# Patient Record
Sex: MALE | Race: WHITE | URBAN - NONMETROPOLITAN AREA
[De-identification: names, ages, dates, MRNs, and addresses within clinical notes are randomized per-mention and may not be internally consistent; named-entity substitution may affect disease eponyms.]

---

## 2018-05-15 ENCOUNTER — DOCTOR'S OFFICE (OUTPATIENT)
Dept: URBAN - NONMETROPOLITAN AREA CLINIC 1 | Facility: CLINIC | Age: 58
Setting detail: OPHTHALMOLOGY
End: 2018-05-15
Payer: COMMERCIAL

## 2018-05-15 DIAGNOSIS — H02.403: ICD-10-CM

## 2018-05-15 DIAGNOSIS — H25.13: ICD-10-CM

## 2018-05-15 DIAGNOSIS — H40.023: ICD-10-CM

## 2018-05-15 DIAGNOSIS — H40.033: ICD-10-CM

## 2018-05-15 DIAGNOSIS — H50.52: ICD-10-CM

## 2018-05-15 DIAGNOSIS — H53.2: ICD-10-CM

## 2018-05-15 PROCEDURE — 99203 OFFICE O/P NEW LOW 30 MIN: CPT | Performed by: OPHTHALMOLOGY

## 2018-05-15 PROCEDURE — 92132 CPTRZD OPH DX IMG ANT SGM: CPT | Performed by: OPHTHALMOLOGY

## 2018-05-15 ASSESSMENT — CONFRONTATIONAL VISUAL FIELD TEST (CVF)
OS_FINDINGS: FULL
OD_FINDINGS: FULL

## 2018-05-15 ASSESSMENT — LID POSITION - PTOSIS
OD_PTOSIS: 1+
OS_PTOSIS: 1+

## 2018-05-18 ASSESSMENT — REFRACTION_MANIFEST
OS_VA1: 20/
OD_VA1: 20/
OS_VA2: 20/
OD_VA3: 20/
OD_VA3: 20/
OS_VA2: 20/
OD_VA1: 20/
OS_VA2: 20/
OD_VA2: 20/
OD_VA3: 20/
OU_VA: 20/
OU_VA: 20/
OS_VA3: 20/
OD_VA2: 20/
OS_VA3: 20/
OD_VA2: 20/
OD_VA1: 20/
OS_VA1: 20/
OS_VA3: 20/
OS_VA1: 20/
OU_VA: 20/

## 2018-05-18 ASSESSMENT — REFRACTION_CURRENTRX
OD_OVR_VA: 20/
OS_OVR_VA: 20/
OD_OVR_VA: 20/
OS_OVR_VA: 20/
OD_OVR_VA: 20/
OS_OVR_VA: 20/

## 2018-05-18 ASSESSMENT — VISUAL ACUITY
OD_BCVA: 20/30+1
OS_BCVA: 20/30

## 2018-05-18 ASSESSMENT — REFRACTION_AUTOREFRACTION
OD_AXIS: 102
OS_SPHERE: +0.50
OS_CYLINDER: -1.75
OD_SPHERE: +1.50
OS_AXIS: 001
OD_CYLINDER: -0.75

## 2018-05-18 ASSESSMENT — SPHEQUIV_DERIVED
OS_SPHEQUIV: -0.375
OD_SPHEQUIV: 1.125

## 2018-05-22 ENCOUNTER — AMBUL SURGICAL CARE (OUTPATIENT)
Dept: URBAN - NONMETROPOLITAN AREA SURGERY 1 | Facility: SURGERY | Age: 58
Setting detail: OPHTHALMOLOGY
End: 2018-05-22
Payer: COMMERCIAL

## 2018-05-22 DIAGNOSIS — H40.031: ICD-10-CM

## 2018-05-22 PROCEDURE — 66761 REVISION OF IRIS: CPT | Performed by: OPHTHALMOLOGY

## 2018-05-22 PROCEDURE — G8918 PT W/O PREOP ORDER IV AB PRO: HCPCS | Performed by: OPHTHALMOLOGY

## 2018-05-22 PROCEDURE — G8907 PT DOC NO EVENTS ON DISCHARG: HCPCS | Performed by: OPHTHALMOLOGY

## 2018-06-12 ENCOUNTER — AMBUL SURGICAL CARE (OUTPATIENT)
Dept: URBAN - NONMETROPOLITAN AREA SURGERY 1 | Facility: SURGERY | Age: 58
Setting detail: OPHTHALMOLOGY
End: 2018-06-12
Payer: COMMERCIAL

## 2018-06-12 DIAGNOSIS — H40.032: ICD-10-CM

## 2018-06-12 PROCEDURE — G8907 PT DOC NO EVENTS ON DISCHARG: HCPCS | Performed by: OPHTHALMOLOGY

## 2018-06-12 PROCEDURE — G8918 PT W/O PREOP ORDER IV AB PRO: HCPCS | Performed by: OPHTHALMOLOGY

## 2018-06-12 PROCEDURE — 66761 REVISION OF IRIS: CPT | Performed by: OPHTHALMOLOGY

## 2018-07-05 ENCOUNTER — DOCTOR'S OFFICE (OUTPATIENT)
Dept: URBAN - NONMETROPOLITAN AREA CLINIC 1 | Facility: CLINIC | Age: 58
Setting detail: OPHTHALMOLOGY
End: 2018-07-05
Payer: COMMERCIAL

## 2018-07-05 DIAGNOSIS — H53.2: ICD-10-CM

## 2018-07-05 DIAGNOSIS — H40.033: ICD-10-CM

## 2018-07-05 DIAGNOSIS — H02.403: ICD-10-CM

## 2018-07-05 DIAGNOSIS — H50.331: ICD-10-CM

## 2018-07-05 DIAGNOSIS — H25.13: ICD-10-CM

## 2018-07-05 DIAGNOSIS — H40.023: ICD-10-CM

## 2018-07-05 PROCEDURE — 92012 INTRM OPH EXAM EST PATIENT: CPT | Performed by: OPHTHALMOLOGY

## 2018-07-05 ASSESSMENT — REFRACTION_AUTOREFRACTION
OD_SPHERE: +1.00
OS_SPHERE: +0.50
OS_CYLINDER: -0.25
OD_CYLINDER: -1.00
OD_AXIS: 180
OS_AXIS: 167

## 2018-07-05 ASSESSMENT — REFRACTION_MANIFEST
OD_VA2: 20/
OD_VA2: 20/
OS_VA3: 20/
OD_VA3: 20/
OS_VA2: 20/
OS_VA3: 20/
OD_VA3: 20/
OS_VA1: 20/
OS_VA2: 20/
OD_VA1: 20/
OU_VA: 20/
OS_VA1: 20/
OD_VA3: 20/
OD_VA2: 20/
OD_VA1: 20/
OS_VA2: 20/
OU_VA: 20/
OU_VA: 20/
OD_VA1: 20/
OS_VA3: 20/
OS_VA1: 20/

## 2018-07-05 ASSESSMENT — REFRACTION_CURRENTRX
OD_OVR_VA: 20/
OD_OVR_VA: 20/
OS_OVR_VA: 20/
OD_OVR_VA: 20/
OS_OVR_VA: 20/
OS_OVR_VA: 20/

## 2018-07-05 ASSESSMENT — VISUAL ACUITY
OD_BCVA: 20/20
OS_BCVA: 20/40

## 2018-07-05 ASSESSMENT — CONFRONTATIONAL VISUAL FIELD TEST (CVF)
OS_FINDINGS: FULL
OD_FINDINGS: FULL

## 2018-07-05 ASSESSMENT — LID POSITION - PTOSIS
OD_PTOSIS: 1+
OS_PTOSIS: 1+

## 2018-07-05 ASSESSMENT — SPHEQUIV_DERIVED
OD_SPHEQUIV: 0.5
OS_SPHEQUIV: 0.375

## 2018-07-13 ENCOUNTER — DOCTOR'S OFFICE (OUTPATIENT)
Dept: URBAN - NONMETROPOLITAN AREA CLINIC 1 | Facility: CLINIC | Age: 58
Setting detail: OPHTHALMOLOGY
End: 2018-07-13
Payer: COMMERCIAL

## 2018-07-13 ENCOUNTER — RX ONLY (RX ONLY)
Age: 58
End: 2018-07-13

## 2018-07-13 DIAGNOSIS — H52.4: ICD-10-CM

## 2018-07-13 DIAGNOSIS — H52.03: ICD-10-CM

## 2018-07-13 PROCEDURE — 92015 DETERMINE REFRACTIVE STATE: CPT | Performed by: OPTOMETRIST

## 2018-07-13 ASSESSMENT — REFRACTION_MANIFEST
OU_VA: 20/
OS_VA3: 20/
OS_VA1: 20/
OD_VA1: 20/
OS_VA2: 20/
OD_VA3: 20/
OS_VA1: 20/
OD_VA1: 20/
OD_VA2: 20/
OS_VA3: 20/
OS_VA2: 20/
OD_VA3: 20/
OU_VA: 20/
OD_VA2: 20/

## 2018-07-13 ASSESSMENT — REFRACTION_CURRENTRX
OS_OVR_VA: 20/
OD_OVR_VA: 20/
OS_OVR_VA: 20/
OS_OVR_VA: 20/
OD_OVR_VA: 20/
OD_OVR_VA: 20/

## 2018-07-13 ASSESSMENT — REFRACTION_OUTSIDERX
OD_VA2: 20/20-1
OD_ADD: +2.00
OS_ADD: +2.00
OS_SPHERE: +0.50
OU_VA: 20/
OD_CYLINDER: -0.50
OS_VA1: 20/20
OD_VA3: 20/
OS_CYLINDER: SPH
OS_VA2: 20/20-1
OS_VA3: 20/
OD_VA1: 20/20-1
OD_SPHERE: +1.00
OD_AXIS: 145

## 2018-07-13 ASSESSMENT — KERATOMETRY
OD_K2POWER_DIOPTERS: 46.00
OS_K1POWER_DIOPTERS: 45.25
OD_K1POWER_DIOPTERS: 45.50
OD_AXISANGLE_DEGREES: 006
OS_K2POWER_DIOPTERS: 46.50
OS_AXISANGLE_DEGREES: 175

## 2018-07-13 ASSESSMENT — REFRACTION_AUTOREFRACTION
OS_CYLINDER: SPH
OD_CYLINDER: -0.75
OD_AXIS: 143
OS_SPHERE: +0.50
OD_SPHERE: +1.00

## 2018-07-13 ASSESSMENT — AXIALLENGTH_DERIVED: OD_AL: 22.5684

## 2018-07-13 ASSESSMENT — VISUAL ACUITY
OS_BCVA: 20/20-2
OD_BCVA: 20/20

## 2018-07-13 ASSESSMENT — SPHEQUIV_DERIVED: OD_SPHEQUIV: 0.625

## 2018-08-10 ENCOUNTER — DOCTOR'S OFFICE (OUTPATIENT)
Dept: URBAN - NONMETROPOLITAN AREA CLINIC 1 | Facility: CLINIC | Age: 58
Setting detail: OPHTHALMOLOGY
End: 2018-08-10
Payer: COMMERCIAL

## 2018-08-10 DIAGNOSIS — H25.013: ICD-10-CM

## 2018-08-10 DIAGNOSIS — H02.403: ICD-10-CM

## 2018-08-10 DIAGNOSIS — H50.331: ICD-10-CM

## 2018-08-10 DIAGNOSIS — H53.2: ICD-10-CM

## 2018-08-10 DIAGNOSIS — H40.033: ICD-10-CM

## 2018-08-10 DIAGNOSIS — H40.023: ICD-10-CM

## 2018-08-10 PROBLEM — H52.03 HYPEROPIA; BOTH EYES: Status: ACTIVE | Noted: 2018-07-13

## 2018-08-10 PROCEDURE — 76514 ECHO EXAM OF EYE THICKNESS: CPT | Performed by: OPHTHALMOLOGY

## 2018-08-10 PROCEDURE — 92014 COMPRE OPH EXAM EST PT 1/>: CPT | Performed by: OPHTHALMOLOGY

## 2018-08-10 PROCEDURE — 92250 FUNDUS PHOTOGRAPHY W/I&R: CPT | Performed by: OPHTHALMOLOGY

## 2018-08-10 ASSESSMENT — REFRACTION_OUTSIDERX
OD_SPHERE: +1.00
OS_CYLINDER: SPH
OU_VA: 20/
OD_VA3: 20/
OS_ADD: +2.00
OS_VA3: 20/
OS_VA2: 20/20-1
OD_VA1: 20/20-1
OD_CYLINDER: -0.50
OD_VA2: 20/20-1
OS_SPHERE: +0.50
OD_AXIS: 145
OD_ADD: +2.00
OS_VA1: 20/20

## 2018-08-10 ASSESSMENT — LID POSITION - PTOSIS
OS_PTOSIS: 1+
OD_PTOSIS: 1+

## 2018-08-10 ASSESSMENT — REFRACTION_CURRENTRX
OS_OVR_VA: 20/
OD_OVR_VA: 20/
OD_OVR_VA: 20/
OS_OVR_VA: 20/
OS_OVR_VA: 20/
OD_OVR_VA: 20/

## 2018-08-10 ASSESSMENT — REFRACTION_MANIFEST
OU_VA: 20/
OD_VA1: 20/
OS_VA3: 20/
OS_VA1: 20/
OD_VA3: 20/
OD_VA3: 20/
OS_VA2: 20/
OD_VA2: 20/
OU_VA: 20/
OS_VA1: 20/
OS_VA2: 20/
OD_VA1: 20/
OS_VA3: 20/
OD_VA2: 20/

## 2018-08-10 ASSESSMENT — REFRACTION_AUTOREFRACTION
OD_AXIS: 143
OS_CYLINDER: SPH
OS_AXIS: 0
OD_CYLINDER: -0.75
OD_SPHERE: +1.00
OS_SPHERE: +0.50

## 2018-08-10 ASSESSMENT — CONFRONTATIONAL VISUAL FIELD TEST (CVF)
OS_FINDINGS: FULL
OD_FINDINGS: FULL

## 2018-08-10 ASSESSMENT — KERATOMETRY
OS_K2POWER_DIOPTERS: 46.50
OD_K1POWER_DIOPTERS: 45.50
OS_AXISANGLE_DEGREES: 175
OD_K2POWER_DIOPTERS: 46.00
OD_AXISANGLE_DEGREES: 006
OS_K1POWER_DIOPTERS: 45.25

## 2018-08-10 ASSESSMENT — VISUAL ACUITY
OD_BCVA: 20/20
OS_BCVA: 20/30

## 2018-08-10 ASSESSMENT — PACHYMETRY
OD_CT_CORRECTION: -1
OD_CT_UM: 550
OS_CT_UM: 561
OS_CT_CORRECTION: -1

## 2018-08-10 ASSESSMENT — SPHEQUIV_DERIVED: OD_SPHEQUIV: 0.625

## 2018-08-10 ASSESSMENT — AXIALLENGTH_DERIVED: OD_AL: 22.5684

## 2020-07-27 ENCOUNTER — TRANSCRIBE ORDERS (OUTPATIENT)
Dept: ADMINISTRATIVE | Facility: HOSPITAL | Age: 60
End: 2020-07-27

## 2020-11-13 DIAGNOSIS — L90.5 SCAR CONDITIONS AND FIBROSIS OF SKIN: ICD-10-CM

## 2020-11-13 DIAGNOSIS — R22.2 LOCALIZED SWELLING, MASS AND LUMP, TRUNK: ICD-10-CM

## 2020-11-13 DIAGNOSIS — R52 PAIN, UNSPECIFIED: ICD-10-CM

## 2020-11-13 DIAGNOSIS — R10.13 EPIGASTRIC PAIN: ICD-10-CM

## 2020-11-13 DIAGNOSIS — D48.1 NEOPLASM OF UNCERTAIN BEHAVIOR OF CONNECTIVE AND OTHER SOFT TISSUE: ICD-10-CM

## 2020-11-13 DIAGNOSIS — R14.0 ABDOMINAL DISTENSION (GASEOUS): ICD-10-CM

## 2020-11-20 ENCOUNTER — HOSPITAL ENCOUNTER (OUTPATIENT)
Dept: CT IMAGING | Facility: HOSPITAL | Age: 60
Discharge: HOME | End: 2020-11-20
Payer: MEDICARE

## 2020-11-20 DIAGNOSIS — R10.13 EPIGASTRIC PAIN: ICD-10-CM

## 2020-11-20 DIAGNOSIS — R14.0 ABDOMINAL DISTENSION (GASEOUS): ICD-10-CM

## 2020-11-20 DIAGNOSIS — R22.2 LOCALIZED SWELLING, MASS AND LUMP, TRUNK: ICD-10-CM

## 2020-11-20 PROCEDURE — 74176 CT ABD & PELVIS W/O CONTRAST: CPT

## 2020-11-20 RX ADMIN — IOHEXOL 50 ML: 240 INJECTION, SOLUTION INTRATHECAL; INTRAVASCULAR; INTRAVENOUS; ORAL at 10:09

## 2021-07-28 ENCOUNTER — APPOINTMENT (EMERGENCY)
Dept: RADIOLOGY | Facility: HOSPITAL | Age: 61
End: 2021-07-28
Payer: MEDICARE

## 2021-07-28 ENCOUNTER — HOSPITAL ENCOUNTER (EMERGENCY)
Facility: HOSPITAL | Age: 61
Discharge: HOME | End: 2021-07-28
Attending: EMERGENCY MEDICINE | Admitting: EMERGENCY MEDICINE
Payer: MEDICARE

## 2021-07-28 VITALS
BODY MASS INDEX: 30.69 KG/M2 | RESPIRATION RATE: 14 BRPM | OXYGEN SATURATION: 95 % | SYSTOLIC BLOOD PRESSURE: 156 MMHG | HEIGHT: 69 IN | DIASTOLIC BLOOD PRESSURE: 78 MMHG | TEMPERATURE: 97.3 F | HEART RATE: 45 BPM | WEIGHT: 207.23 LBS

## 2021-07-28 DIAGNOSIS — R07.9 CHEST PAIN: Primary | ICD-10-CM

## 2021-07-28 LAB
ALBUMIN SERPL BCP-MCNC: 3.7 G/DL (ref 3.5–5)
ALP SERPL-CCNC: 109 U/L (ref 46–116)
ALT SERPL W P-5'-P-CCNC: 32 U/L (ref 12–78)
ANION GAP SERPL CALCULATED.3IONS-SCNC: 9 MMOL/L (ref 4–13)
APTT PPP: 27 SECONDS (ref 23–37)
AST SERPL W P-5'-P-CCNC: 14 U/L (ref 5–45)
BASOPHILS # BLD AUTO: 0.09 THOUSANDS/ΜL (ref 0–0.1)
BASOPHILS NFR BLD AUTO: 1 % (ref 0–1)
BILIRUB SERPL-MCNC: 0.41 MG/DL (ref 0.2–1)
BUN SERPL-MCNC: 12 MG/DL (ref 5–25)
CALCIUM SERPL-MCNC: 8.1 MG/DL (ref 8.3–10.1)
CHLORIDE SERPL-SCNC: 113 MMOL/L (ref 100–108)
CK SERPL-CCNC: 43 U/L (ref 39–308)
CO2 SERPL-SCNC: 26 MMOL/L (ref 21–32)
CREAT SERPL-MCNC: 1.17 MG/DL (ref 0.6–1.3)
EOSINOPHIL # BLD AUTO: 0.21 THOUSAND/ΜL (ref 0–0.61)
EOSINOPHIL NFR BLD AUTO: 3 % (ref 0–6)
ERYTHROCYTE [DISTWIDTH] IN BLOOD BY AUTOMATED COUNT: 12.8 % (ref 11.6–15.1)
GFR SERPL CREATININE-BSD FRML MDRD: 68 ML/MIN/1.73SQ M
GLUCOSE SERPL-MCNC: 104 MG/DL (ref 65–140)
HCT VFR BLD AUTO: 41.9 % (ref 36.5–49.3)
HGB BLD-MCNC: 14.3 G/DL (ref 12–17)
IMM GRANULOCYTES # BLD AUTO: 0.02 THOUSAND/UL (ref 0–0.2)
IMM GRANULOCYTES NFR BLD AUTO: 0 % (ref 0–2)
INR PPP: 1.02 (ref 0.84–1.19)
LYMPHOCYTES # BLD AUTO: 1.56 THOUSANDS/ΜL (ref 0.6–4.47)
LYMPHOCYTES NFR BLD AUTO: 24 % (ref 14–44)
MCH RBC QN AUTO: 31.8 PG (ref 26.8–34.3)
MCHC RBC AUTO-ENTMCNC: 34.1 G/DL (ref 31.4–37.4)
MCV RBC AUTO: 93 FL (ref 82–98)
MONOCYTES # BLD AUTO: 0.54 THOUSAND/ΜL (ref 0.17–1.22)
MONOCYTES NFR BLD AUTO: 8 % (ref 4–12)
NEUTROPHILS # BLD AUTO: 4.12 THOUSANDS/ΜL (ref 1.85–7.62)
NEUTS SEG NFR BLD AUTO: 64 % (ref 43–75)
NRBC BLD AUTO-RTO: 0 /100 WBCS
PLATELET # BLD AUTO: 190 THOUSANDS/UL (ref 149–390)
PMV BLD AUTO: 10 FL (ref 8.9–12.7)
POTASSIUM SERPL-SCNC: 3.5 MMOL/L (ref 3.5–5.3)
PROT SERPL-MCNC: 7.3 G/DL (ref 6.4–8.2)
PROTHROMBIN TIME: 13.2 SECONDS (ref 11.6–14.5)
RBC # BLD AUTO: 4.49 MILLION/UL (ref 3.88–5.62)
SODIUM SERPL-SCNC: 148 MMOL/L (ref 136–145)
TROPONIN I SERPL-MCNC: <0.02 NG/ML
TROPONIN I SERPL-MCNC: <0.02 NG/ML
WBC # BLD AUTO: 6.54 THOUSAND/UL (ref 4.31–10.16)

## 2021-07-28 PROCEDURE — 99285 EMERGENCY DEPT VISIT HI MDM: CPT

## 2021-07-28 PROCEDURE — 71045 X-RAY EXAM CHEST 1 VIEW: CPT

## 2021-07-28 PROCEDURE — 80053 COMPREHEN METABOLIC PANEL: CPT | Performed by: PHYSICIAN ASSISTANT

## 2021-07-28 PROCEDURE — 85025 COMPLETE CBC W/AUTO DIFF WBC: CPT | Performed by: PHYSICIAN ASSISTANT

## 2021-07-28 PROCEDURE — 36415 COLL VENOUS BLD VENIPUNCTURE: CPT | Performed by: PHYSICIAN ASSISTANT

## 2021-07-28 PROCEDURE — 84484 ASSAY OF TROPONIN QUANT: CPT | Performed by: PHYSICIAN ASSISTANT

## 2021-07-28 PROCEDURE — 93005 ELECTROCARDIOGRAM TRACING: CPT

## 2021-07-28 PROCEDURE — 85610 PROTHROMBIN TIME: CPT | Performed by: PHYSICIAN ASSISTANT

## 2021-07-28 PROCEDURE — 82550 ASSAY OF CK (CPK): CPT | Performed by: PHYSICIAN ASSISTANT

## 2021-07-28 PROCEDURE — 99285 EMERGENCY DEPT VISIT HI MDM: CPT | Performed by: EMERGENCY MEDICINE

## 2021-07-28 PROCEDURE — 85730 THROMBOPLASTIN TIME PARTIAL: CPT | Performed by: PHYSICIAN ASSISTANT

## 2021-07-28 RX ORDER — ASPIRIN 81 MG/1
324 TABLET, CHEWABLE ORAL ONCE
Status: COMPLETED | OUTPATIENT
Start: 2021-07-28 | End: 2021-07-28

## 2021-07-28 RX ORDER — MEMANTINE HYDROCHLORIDE 10 MG/1
10 TABLET ORAL 2 TIMES DAILY
COMMUNITY

## 2021-07-28 RX ORDER — DIVALPROEX SODIUM 500 MG/1
500 TABLET, DELAYED RELEASE ORAL EVERY 12 HOURS SCHEDULED
COMMUNITY

## 2021-07-28 RX ORDER — PRAVASTATIN SODIUM 80 MG/1
80 TABLET ORAL DAILY
COMMUNITY
Start: 2021-07-06

## 2021-07-28 RX ORDER — LEVOTHYROXINE SODIUM 125 UG/1
125 CAPSULE ORAL DAILY
COMMUNITY

## 2021-07-28 RX ORDER — LOSARTAN POTASSIUM 50 MG/1
50 TABLET ORAL
COMMUNITY
Start: 2021-05-14 | End: 2021-09-22 | Stop reason: ALTCHOICE

## 2021-07-28 RX ORDER — NITROGLYCERIN 0.4 MG/1
0.4 TABLET SUBLINGUAL ONCE
Status: COMPLETED | OUTPATIENT
Start: 2021-07-28 | End: 2021-07-28

## 2021-07-28 RX ORDER — TOPIRAMATE 100 MG/1
100 TABLET, FILM COATED ORAL 2 TIMES DAILY
COMMUNITY

## 2021-07-28 RX ORDER — TAMSULOSIN HYDROCHLORIDE 0.4 MG/1
0.4 CAPSULE ORAL
COMMUNITY

## 2021-07-28 RX ORDER — METOPROLOL SUCCINATE 25 MG/1
TABLET, EXTENDED RELEASE ORAL
COMMUNITY
End: 2021-09-22 | Stop reason: ALTCHOICE

## 2021-07-28 RX ORDER — CHOLESTYRAMINE 4 G/9G
1 POWDER, FOR SUSPENSION ORAL DAILY PRN
COMMUNITY
End: 2022-04-19

## 2021-07-28 RX ORDER — CITALOPRAM 40 MG/1
40 TABLET ORAL DAILY
COMMUNITY
End: 2022-04-22 | Stop reason: ALTCHOICE

## 2021-07-28 RX ADMIN — ASPIRIN 324 MG: 81 TABLET, CHEWABLE ORAL at 10:39

## 2021-07-28 RX ADMIN — NITROGLYCERIN 0.4 MG: 0.4 TABLET SUBLINGUAL at 10:41

## 2021-07-28 NOTE — ED NOTES
ECG taken/signed by MD @1027       Joaquina Becerra  07/28/21 [facility] [place] [facility]  07/28/21 1033

## 2021-07-28 NOTE — ED PROVIDER NOTES
History  Chief Complaint   Patient presents with    Chest Pain     pt c/o intermittent chest pain radiating to back w/sob starting 2 days  denies travel/fevers/cough/n/v     62year old male presents emergency department for evaluation of intermittent left-sided chest pain radiating to the back onset last night  States, "then it went down my left arm but I think I was just getting in my head " Past medical history: Past history hyperlipidemia, hypertension, hypertriglyceridemia, seizure, thyroid disease anxiety  Patient reports pain is sharp in nature  Denies anything making pain better or worse  Has not taken anything at home  Contrary to triage note patient denies any shortness of breath to me  He denies any cough, congestion, leg swelling or palpitations  He denies nausea, vomiting, diaphoresis  Denies any fevers or chills  Denies any abdominal pain  Patient states he has had take any blood thinners  Does not follow with cardiology  History provided by:  Patient  Chest Pain  Pain location:  L chest  Pain quality: sharp    Pain radiates to:  Mid back  Pain radiates to the back: yes    Pain severity now: 2/10  Onset quality:  Sudden  Duration:  1 day  Timing:  Intermittent  Progression:  Unchanged  Chronicity:  New  Context: at rest    Context: not breathing, not lifting, no movement and not raising an arm    Relieved by:  None tried  Worsened by:  Nothing tried  Ineffective treatments:  None tried  Associated symptoms: no abdominal pain, no AICD problem, no altered mental status, no anorexia, no anxiety, no back pain, no claudication, no cough, no diaphoresis, no dizziness, no dysphagia, no fatigue, no fever, no headache, no heartburn, no lower extremity edema, no nausea, no near-syncope, no numbness, no orthopnea, no palpitations, no PND, no shortness of breath, no syncope, not vomiting and no weakness        Prior to Admission Medications   Prescriptions Last Dose Informant Patient Reported? Taking? Levothyroxine Sodium 125 MCG CAPS   Yes No   Sig: daily   cholestyramine (QUESTRAN) 4 g packet   Yes No   citalopram (CeleXA) 40 mg tablet   Yes No   divalproex sodium (DEPAKOTE) 500 mg EC tablet   Yes No   Si (two) times a day   losartan (COZAAR) 50 mg tablet   Yes Yes   Sig: Take 50 mg by mouth   memantine (NAMENDA) 10 mg tablet   Yes No   Sig: Take by mouth 2 (two) times a day   metoprolol succinate (TOPROL-XL) 25 mg 24 hr tablet   Yes No   Sig: Take by mouth   pravastatin (PRAVACHOL) 80 mg tablet   Yes Yes   Sig: Take 80 mg by mouth   tamsulosin (FLOMAX) 0 4 mg   Yes No   Sig: Take 0 4 mg by mouth   topiramate (TOPAMAX) 100 mg tablet   Yes No   Sig: Take by mouth 2 (two) times a day      Facility-Administered Medications: None       Past Medical History:   Diagnosis Date    Abnormal EKG     Aneurysm of anterior cerebral artery     Anxiety     BPH (benign prostatic hyperplasia)     Chronic migraine     Depression     Disease of thyroid gland     Hydrocephalus (HCC)     Hyperlipidemia     Hypertension     Hypertriglyceridemia     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     Neutropenia (HCC)     Primary thrombocytopenia (HCC)     Seizure ([facility] [address] )     Sleep apnea        Past Surgical History:   Procedure Laterality Date    BRAIN SURGERY      CARDIAC CATHETERIZATION      CHOLECYSTECTOMY      VENTRICULOPERITONEAL SHUNT         History reviewed  No pertinent family history  I have reviewed and agree with the history as documented  E-Cigarette/Vaping    E-Cigarette Use Never User      E-Cigarette/Vaping Substances     Social History     Tobacco Use    Smoking status: Never Smoker    Smokeless tobacco: Never Used   Vaping Use    Vaping Use: Never used   Substance Use Topics    Alcohol use: Never    Drug use: Never       Review of Systems   Constitutional: Negative for appetite change, chills, diaphoresis, fatigue and fever     HENT: Negative  Negative for trouble swallowing  Respiratory: Negative for cough, choking, chest tightness, shortness of breath, wheezing and stridor  Cardiovascular: Positive for chest pain  Negative for palpitations, orthopnea, claudication, leg swelling, syncope, PND and near-syncope  Gastrointestinal: Negative  Negative for abdominal pain, anorexia, heartburn, nausea and vomiting  Musculoskeletal: Negative  Negative for back pain  Skin: Negative  Neurological: Negative  Negative for dizziness, weakness, numbness and headaches  All other systems reviewed and are negative  Physical Exam  Physical Exam  Vitals and nursing note reviewed  Constitutional:       General: He is not in acute distress  Appearance: He is well-developed and normal weight  He is not ill-appearing, toxic-appearing or diaphoretic  HENT:      Head: Normocephalic and atraumatic  Eyes:      Extraocular Movements: Extraocular movements intact  Pupils: Pupils are equal, round, and reactive to light  Cardiovascular:      Rate and Rhythm: Normal rate and regular rhythm  Pulses:           Radial pulses are 2+ on the right side and 2+ on the left side  Dorsalis pedis pulses are 2+ on the right side and 2+ on the left side  Posterior tibial pulses are 2+ on the right side and 2+ on the left side  Heart sounds: Normal heart sounds  Pulmonary:      Effort: Pulmonary effort is normal  No tachypnea or respiratory distress  Breath sounds: No decreased breath sounds, wheezing, rhonchi or rales  Chest:      Chest wall: No mass, deformity or tenderness  Abdominal:      General: Bowel sounds are normal  There is no abdominal bruit  Palpations: Abdomen is soft  There is no mass  Tenderness: There is no abdominal tenderness  There is no guarding  Musculoskeletal:         General: Normal range of motion  Cervical back: Normal range of motion        Right lower leg: No tenderness  No edema  Left lower leg: No tenderness  No edema  Skin:     General: Skin is warm and dry  Capillary Refill: Capillary refill takes less than 2 seconds  Coloration: Skin is not pale  Findings: No ecchymosis, erythema or rash  Neurological:      General: No focal deficit present  Mental Status: He is alert and oriented to person, place, and time     Psychiatric:         Mood and Affect: Mood normal          Behavior: Behavior normal          Vital Signs  ED Triage Vitals [07/28/21 1027]   Temperature Pulse Respirations Blood Pressure SpO2   (!) 97 3 °F (36 3 °C) 58 20 (!) 179/85 96 %      Temp Source Heart Rate Source Patient Position - Orthostatic VS BP Location FiO2 (%)   Temporal Monitor Lying Right arm --      Pain Score       4           Vitals:    07/28/21 1230 07/28/21 1330 07/28/21 1400 07/28/21 1430   BP: 148/76 154/70 156/74 156/78   Pulse: (!) 46 (!) 48 (!) 47 (!) 45   Patient Position - Orthostatic VS: Lying   Lying         Visual Acuity      ED Medications  Medications   aspirin chewable tablet 324 mg (324 mg Oral Given 7/28/21 1039)   nitroglycerin (NITROSTAT) SL tablet 0 4 mg (0 4 mg Sublingual Given 7/28/21 1041)       Diagnostic Studies  Results Reviewed     Procedure Component Value Units Date/Time    Troponin I repeat in 3hrs [[id number]]  (Normal) Collected: 07/28/21 1325    Lab Status: Final result Specimen: Blood from Arm, Right Updated: 07/28/21 1346     Troponin I <0 02 ng/mL     Troponin I [[id number]]  (Normal) Collected: 07/28/21 1035    Lab Status: Final result Specimen: Blood from Arm, Right Updated: 07/28/21 1100     Troponin I <0 02 ng/mL     CK Total with Reflex CKMB [[id number]]  (Normal) Collected: 07/28/21 1035    Lab Status: Final result Specimen: Blood from Arm, Right Updated: 07/28/21 1056     Total CK 43 U/L     Comprehensive metabolic panel [[id number]]  (Abnormal) Collected: 07/28/21 1035    Lab Status: Final result Specimen: Blood from Arm, Right Updated: 07/28/21 1056     Sodium 148 mmol/L      Potassium 3 5 mmol/L      Chloride 113 mmol/L      CO2 26 mmol/L      ANION GAP 9 mmol/L      BUN 12 mg/dL      Creatinine 1 17 mg/dL      Glucose 104 mg/dL      Calcium 8 1 mg/dL      AST 14 U/L      ALT 32 U/L      Alkaline Phosphatase 109 U/L      Total Protein 7 3 g/dL      Albumin 3 7 g/dL      Total Bilirubin 0 41 mg/dL      eGFR 68 ml/min/1 73sq m     Narrative:      National Kidney Disease Foundation guidelines for Chronic Kidney Disease (CKD):     Stage 1 with normal or high GFR (GFR > 90 mL/min/1 73 square meters)    Stage 2 Mild CKD (GFR = 60-89 mL/min/1 73 square meters)    Stage 3A Moderate CKD (GFR = 45-59 mL/min/1 73 square meters)    Stage 3B Moderate CKD (GFR = 30-44 mL/min/1 73 square meters)    Stage 4 Severe CKD (GFR = 15-29 mL/min/1 73 square meters)    Stage 5 End Stage CKD (GFR <15 mL/min/1 73 square meters)  Note: GFR calculation is accurate only with a steady state creatinine    Protime-INR [[id number]]  (Normal) Collected: 07/28/21 1035    Lab Status: Final result Specimen: Blood from Arm, Right Updated: 07/28/21 1053     Protime 13 2 seconds      INR 1 02    APTT [[id number]]  (Normal) Collected: 07/28/21 1035    Lab Status: Final result Specimen: Blood from Arm, Right Updated: 07/28/21 1053     PTT 27 seconds     CBC and differential [[id number]] Collected: 07/28/21 1035    Lab Status: Final result Specimen: Blood from Arm, Right Updated: 07/28/21 1043     WBC 6 54 Thousand/uL      RBC 4 49 Million/uL      Hemoglobin 14 3 g/dL      Hematocrit 41 9 %      MCV 93 fL      MCH 31 8 pg      MCHC 34 1 g/dL      RDW 12 8 %      MPV 10 0 fL      Platelets 526 Thousands/uL      nRBC 0 /100 WBCs      Neutrophils Relative 64 %      Immat GRANS % 0 %      Lymphocytes Relative 24 %      Monocytes Relative 8 %      Eosinophils Relative 3 %      Basophils Relative 1 %      Neutrophils Absolute 4 12 Thousands/µL      Immature Grans Absolute 0 02 Thousand/uL      Lymphocytes Absolute 1 56 Thousands/µL      Monocytes Absolute 0 54 Thousand/µL      Eosinophils Absolute 0 21 Thousand/µL      Basophils Absolute 0 09 Thousands/µL                  XR chest 1 view portable   Final Result by Joana Nielson MD (07/28 1235)      No acute cardiopulmonary disease  Stable exam            Workstation performed: [id number]                    Procedures  ECG 12 Lead Documentation Only    Date/Time: 7/28/2021 11:19 AM  Performed by: Adriana Alonso PA-C  Authorized by: Adriana Alonso PA-C     Indications / Diagnosis:  Chest pain  ECG reviewed by me, the ED Provider: yes    Patient location:  ED  Interpretation:     Interpretation: non-specific    Rate:     ECG rate:  51    ECG rate assessment: normal    Rhythm:     Rhythm: sinus rhythm    Ectopy:     Ectopy: none    QRS:     QRS axis:  Normal    QRS intervals:  Normal  Conduction:     Conduction: normal    ST segments:     ST segments:  Normal  T waves:     T waves: inverted      Inverted:  III             ED Course  ED Course as of Jul 28 1507   Wed Jul 28, 2021   1046 Pain is relieved after 1 sublingual nitro      1109 Updated patient and spouse on current results  CBC unremarkable  Troponin negative  Will wait for 3 hour troponin      1111 Patient remained asymptomatic at this time      1347 Repeat trop negative   Troponin I: <0 02   1347 I discussed results and findings with patient and spouse  We discussed symptomatic treatment at home and symptoms that require prompt return to the ED for further evaluation the patient verbalized understanding  Will have patient follow-up with cardiology, referral was placed  Patient agreed to this treatment plan strict return precautions were discussed he verbalized understanding    He agreed to treatment plan was discharged home                HEART Risk Score      Most Recent Value   Heart Score Risk Calculator   History  0 Filed at: 07/28/2021 1108   ECG  0 Filed at: 07/28/2021 1108   Age  1 Filed at: 07/28/2021 1108   Risk Factors  2 Filed at: 07/28/2021 1108   Troponin  0 Filed at: 07/28/2021 1108   HEART Score  3 Filed at: 07/28/2021 1108                      SBIRT 22yo+      Most Recent Value   SBIRT (23 yo +)   In order to provide better care to our patients, we are screening all of our patients for alcohol and drug use  Would it be okay to ask you these screening questions? Yes Filed at: 07/28/2021 1038   Initial Alcohol Screen: US AUDIT-C    1  How often do you have a drink containing alcohol?  0 Filed at: 07/28/2021 1038   2  How many drinks containing alcohol do you have on a typical day you are drinking? 0 Filed at: 07/28/2021 1038   3a  Male UNDER 65: How often do you have five or more drinks on one occasion? 0 Filed at: 07/28/2021 1038   3b  FEMALE Any Age, or MALE 65+: How often do you have 4 or more drinks on one occassion? 0 Filed at: 07/28/2021 1038   Audit-C Score  0 Filed at: 07/28/2021 1038   NOY: How many times in the past year have you    Used an illegal drug or used a prescription medication for non-medical reasons? Never Filed at: 07/28/2021 1038                    MDM  Number of Diagnoses or Management Options  Chest pain: new and requires workup  Diagnosis management comments: 62year old male presents the emergency department for evaluation of chest pain intermittent since last night  Vitals and medical record reviewed  Physical exam was relatively unremarkable  EKG was nonischemic  Troponin x2 negative  Laboratory findings relatively unremarkable  ED interpretation of chest x-ray negative for acute cardiopulmonary findings  Patient had complete symptomatic resolution with 1 sublingual nitro  I discussed all results and findings with the patient  Will have patient follow-up with cardiology, a referral was placed  Strict return precautions were discussed and both patient and spouse verbalized understanding    He remained well emergency department and was discharged home  Amount and/or Complexity of Data Reviewed  Clinical lab tests: ordered  Tests in the radiology section of CPT®: ordered and reviewed  Review and summarize past medical records: yes  Independent visualization of images, tracings, or specimens: yes        Disposition  Final diagnoses:   Chest pain     Time reflects when diagnosis was documented in both MDM as applicable and the Disposition within this note     Time User Action Codes Description Comment    7/28/2021  1:48 PM Ona Schilder Add [R07 9] Chest pain       ED Disposition     ED Disposition Condition Date/Time Comment    Discharge Stable Wed Jul 28, 2021  1:47 PM Donta Guidry discharge to home/self care              Follow-up Information     Follow up With Specialties Details Why Contact Info    Saumya Arnold MD Internal Medicine   [address] [place] [address], [address] [place] [address] Cardiology   [address] [place] [address]  [address]  [address] [place] [address] [id number]  [phone]            Discharge Medication List as of 7/28/2021  1:49 PM      CONTINUE these medications which have NOT CHANGED    Details   losartan (COZAAR) 50 mg tablet Take 50 mg by mouth, Starting Fri 5/14/2021, Historical Med      pravastatin (PRAVACHOL) 80 mg tablet Take 80 mg by mouth, Starting Tue 7/6/2021, Historical Med      cholestyramine (QUESTRAN) 4 g packet Historical Med      citalopram (CeleXA) 40 mg tablet Historical Med      divalproex sodium (DEPAKOTE) 500 mg EC tablet 2 (two) times a day, Historical Med      Levothyroxine Sodium 125 MCG CAPS daily, Historical Med      memantine (NAMENDA) 10 mg tablet Take by mouth 2 (two) times a day, Historical Med      metoprolol succinate (TOPROL-XL) 25 mg 24 hr tablet Take by mouth, Historical Med      tamsulosin (FLOMAX) 0 4 mg Take 0 4 mg by mouth, Historical Med      topiramate (TOPAMAX) 100 mg tablet Take by mouth 2 (two) times a day, Historical Med PDMP Review     None          ED Provider  Electronically Signed by           Nam Pan PA-C  07/28/21 7741

## 2021-07-29 LAB
ATRIAL RATE: 51 BPM
P AXIS: 38 DEGREES
PR INTERVAL: 140 MS
QRS AXIS: 92 DEGREES
QRSD INTERVAL: 94 MS
QT INTERVAL: 494 MS
QTC INTERVAL: 455 MS
T WAVE AXIS: -10 DEGREES
VENTRICULAR RATE: 51 BPM

## 2021-09-22 ENCOUNTER — TELEPHONE (OUTPATIENT)
Dept: CARDIOLOGY CLINIC | Facility: CLINIC | Age: 61
End: 2021-09-22

## 2021-09-22 ENCOUNTER — HOSPITAL ENCOUNTER (OUTPATIENT)
Dept: NON INVASIVE DIAGNOSTICS | Facility: HOSPITAL | Age: 61
Discharge: HOME | End: 2021-09-22
Attending: INTERNAL MEDICINE
Payer: MEDICARE

## 2021-09-22 ENCOUNTER — APPOINTMENT (OUTPATIENT)
Dept: LAB | Facility: HOSPITAL | Age: 61
End: 2021-09-22
Attending: INTERNAL MEDICINE
Payer: MEDICARE

## 2021-09-22 ENCOUNTER — OFFICE VISIT (OUTPATIENT)
Dept: CARDIOLOGY CLINIC | Facility: CLINIC | Age: 61
End: 2021-09-22
Payer: MEDICARE

## 2021-09-22 VITALS
BODY MASS INDEX: 30.51 KG/M2 | TEMPERATURE: 98.2 F | SYSTOLIC BLOOD PRESSURE: 154 MMHG | HEART RATE: 51 BPM | WEIGHT: 206 LBS | DIASTOLIC BLOOD PRESSURE: 88 MMHG | OXYGEN SATURATION: 97 % | HEIGHT: 69 IN

## 2021-09-22 DIAGNOSIS — M79.605 PAIN OF LEFT LOWER EXTREMITY: ICD-10-CM

## 2021-09-22 DIAGNOSIS — G47.33 OBSTRUCTIVE SLEEP APNEA: ICD-10-CM

## 2021-09-22 DIAGNOSIS — I10 ESSENTIAL HYPERTENSION: ICD-10-CM

## 2021-09-22 DIAGNOSIS — R60.0 LOCALIZED EDEMA: ICD-10-CM

## 2021-09-22 DIAGNOSIS — R07.9 CHEST PAIN, UNSPECIFIED TYPE: ICD-10-CM

## 2021-09-22 DIAGNOSIS — E78.2 MIXED HYPERLIPIDEMIA: ICD-10-CM

## 2021-09-22 DIAGNOSIS — E11.9 DIABETES MELLITUS WITHOUT COMPLICATION (HCC): ICD-10-CM

## 2021-09-22 DIAGNOSIS — R07.9 CHEST PAIN, UNSPECIFIED TYPE: Primary | ICD-10-CM

## 2021-09-22 LAB
ANION GAP SERPL CALCULATED.3IONS-SCNC: 8 MMOL/L (ref 4–13)
BASOPHILS # BLD AUTO: 0.09 THOUSANDS/ΜL (ref 0–0.1)
BASOPHILS NFR BLD AUTO: 1 % (ref 0–1)
BUN SERPL-MCNC: 12 MG/DL (ref 5–25)
CALCIUM SERPL-MCNC: 8.6 MG/DL (ref 8.3–10.1)
CHLORIDE SERPL-SCNC: 109 MMOL/L (ref 100–108)
CO2 SERPL-SCNC: 28 MMOL/L (ref 21–32)
CREAT SERPL-MCNC: 1.03 MG/DL (ref 0.6–1.3)
EOSINOPHIL # BLD AUTO: 0.12 THOUSAND/ΜL (ref 0–0.61)
EOSINOPHIL NFR BLD AUTO: 2 % (ref 0–6)
ERYTHROCYTE [DISTWIDTH] IN BLOOD BY AUTOMATED COUNT: 13.2 % (ref 11.6–15.1)
GFR SERPL CREATININE-BSD FRML MDRD: 79 ML/MIN/1.73SQ M
GLUCOSE SERPL-MCNC: 87 MG/DL (ref 65–140)
HCT VFR BLD AUTO: 43.1 % (ref 36.5–49.3)
HGB BLD-MCNC: 14.3 G/DL (ref 12–17)
IMM GRANULOCYTES # BLD AUTO: 0.04 THOUSAND/UL (ref 0–0.2)
IMM GRANULOCYTES NFR BLD AUTO: 1 % (ref 0–2)
LYMPHOCYTES # BLD AUTO: 1.93 THOUSANDS/ΜL (ref 0.6–4.47)
LYMPHOCYTES NFR BLD AUTO: 29 % (ref 14–44)
MCH RBC QN AUTO: 31.1 PG (ref 26.8–34.3)
MCHC RBC AUTO-ENTMCNC: 33.2 G/DL (ref 31.4–37.4)
MCV RBC AUTO: 94 FL (ref 82–98)
MONOCYTES # BLD AUTO: 0.6 THOUSAND/ΜL (ref 0.17–1.22)
MONOCYTES NFR BLD AUTO: 9 % (ref 4–12)
NEUTROPHILS # BLD AUTO: 3.84 THOUSANDS/ΜL (ref 1.85–7.62)
NEUTS SEG NFR BLD AUTO: 58 % (ref 43–75)
NRBC BLD AUTO-RTO: 0 /100 WBCS
NT-PROBNP SERPL-MCNC: 164 PG/ML
PLATELET # BLD AUTO: 209 THOUSANDS/UL (ref 149–390)
PMV BLD AUTO: 10.4 FL (ref 8.9–12.7)
POTASSIUM SERPL-SCNC: 3.7 MMOL/L (ref 3.5–5.3)
RBC # BLD AUTO: 4.6 MILLION/UL (ref 3.88–5.62)
SODIUM SERPL-SCNC: 145 MMOL/L (ref 136–145)
WBC # BLD AUTO: 6.62 THOUSAND/UL (ref 4.31–10.16)

## 2021-09-22 PROCEDURE — 99204 OFFICE O/P NEW MOD 45 MIN: CPT | Performed by: INTERNAL MEDICINE

## 2021-09-22 PROCEDURE — 83880 ASSAY OF NATRIURETIC PEPTIDE: CPT

## 2021-09-22 PROCEDURE — 85025 COMPLETE CBC W/AUTO DIFF WBC: CPT

## 2021-09-22 PROCEDURE — 93971 EXTREMITY STUDY: CPT

## 2021-09-22 PROCEDURE — 80048 BASIC METABOLIC PNL TOTAL CA: CPT

## 2021-09-22 PROCEDURE — 36415 COLL VENOUS BLD VENIPUNCTURE: CPT

## 2021-09-22 RX ORDER — LOSARTAN POTASSIUM AND HYDROCHLOROTHIAZIDE 25; 100 MG/1; MG/1
1 TABLET ORAL DAILY
Qty: 30 TABLET | Refills: 11 | Status: SHIPPED | OUTPATIENT
Start: 2021-09-22

## 2021-09-22 NOTE — TELEPHONE ENCOUNTER
Shiraz Larry from the vascular lab LM stating that pt's US was Neg  She sent the pt home  Will send you a preliminary  If you need to speak to her she is at ext [phone]

## 2021-09-22 NOTE — PROGRESS NOTES
Cardiology Office Visit    Jeb Coats  [id number]  1960    [facility]  [address]  [address]      Dear Mary Lawson MD,    I had the pleasure of seeing your patient at our [address] [facility] today 9/22/2021  As you know he is a pleasant  male with a medical history as described below  Reason for office visit: Evaluation of chest pain and hypertension  1  Chest pain, unspecified type  Assessment & Plan:  Patient reports episodes of left-sided sharp, grabbing pain with associated left hand numbness and tingling as well as occasional shortness of breath  Patient was evaluated in the emergency room 7/28/2021 and continues to have intermittent episodes of chest pain  He does report prior cardiac catheterization in the 90s (report not available for review)  I have recommended Lexiscan nuclear stress test   Echocardiogram to assess heart structure and function  Orders:  -     POCT ECG  -     NM myocardial perfusion spect (rx stress and/or rest); Future; Expected date: 09/22/2021  -     Basic metabolic panel; Future  -     NT-BNP PRO; Future  -     CBC and differential; Future  -     Echo complete; Future; Expected date: 09/22/2021    2  Essential hypertension  Assessment & Plan:  Patient reports recent increase in blood pressure  He is bradycardic on exam today with a heart rate of 51 bpm   He also has bilateral lower extremity edema on exam   I have recommended discontinuing metoprolol succinate as well as losartan  Recommend starting losartan/hydrochlorothiazide 100/25 mg daily  Continue to monitor blood pressures at home and call with any significant side effects with transitioning to new medication  Recommend blood work today and repeat in 3 weeks to check renal function with the addition of losartan HCTZ      Orders:  -     NM myocardial perfusion spect (rx stress and/or rest); Future; Expected date: 09/22/2021  -     Basic metabolic panel; Future  -     NT-BNP PRO; Future  -     CBC and differential; Future  -     losartan-hydrochlorothiazide (HYZAAR) 100-25 MG per tablet; Take 1 tablet by mouth daily  -     Basic metabolic panel; Future; Expected date: 10/13/2021    3  Mixed hyperlipidemia  Assessment & Plan:  Lipid panel 5/14/2021:     HDL 28  LDL 71  Pravastatin 80mg daily  Triglycerides above goal    Will monitor and consider medication adjustment as needed  Reviewed dietary modification for triglyceride control  If triglycerides remain elevated we can consider the addition of fenofibrate    Orders:  -     NM myocardial perfusion spect (rx stress and/or rest); Future; Expected date: 09/22/2021  -     Basic metabolic panel; Future  -     NT-BNP PRO; Future  -     CBC and differential; Future    4  Localized edema  Assessment & Plan:  Patient reports bilateral lower extremity edema with left greater than right  He also has noted left leg pain which started approximately 2 weeks ago  I have recommended venous Doppler rule out DVT which has been scheduled  Transitioning from metoprolol and losartan to losartan hydrochlorothiazide to see if this helps with his lower extremity edema  Will check updated blood work including NT proBNP today  Echocardiogram to assess heart structure and function  Orders:  -     VAS lower limb venous duplex study, unilateral/limited; Future; Expected date: 09/22/2021  -     NM myocardial perfusion spect (rx stress and/or rest); Future; Expected date: 09/22/2021  -     Basic metabolic panel; Future  -     NT-BNP PRO; Future  -     CBC and differential; Future  -     Echo complete; Future; Expected date: 09/22/2021    5  Pain of left lower extremity  Assessment & Plan:  Left leg edema and pain  Recommend venous Doppler rule out DVT  Orders:  -     VAS lower limb venous duplex study, unilateral/limited;  Future; Expected date: 09/22/2021    6  Obstructive sleep apnea  Assessment & Plan:  Patient has a history of obstructive sleep apnea  He is compliant with CPAP use  HPI     Patient has a past medical history of hyperlipidemia, hypertension, seizure disorder, thyroid disease, BPH, BEREKET,cerebral aneurysm, IBS, congenital hydrocephalus with  shunt, neuropathy and anxiety  Patient presented to the ER at [facility] 7/28/2021 for evaluation of chest pain  He told his wife that he had left sided chest pain and he tried antacids but did not note any significant change in symptoms  The next day he woke up with chest pain in am and was seen in the ER and given 4 low dose aspirin and nitro and pain resolved  He also had noted some left arm numbness and shortness of breath  Blood pressure was high on presentation  He was noted to be bradycardiac in ER  Troponin x 2 was negative  Patient was discharged home  Patient was evaluated previously by [facility] 10/27/2017 for lightheadedness, dizziness and palpitations  Chin Leigh reported "occasional brief episodes of SVT"     9/22/2021: Patient presents to the office for evaluation  Patient does report some recurrent episodes of chest pain since being evaluated in the emergency room  He describes sharp, grabbing pain with associated left hand numbness and tingling  He noted onset of left lower extremity swelling and pain which started approximately 2 weeks ago  He also notes right-sided low back pain  He has previously been evaluated by [facility] and underwent a cardiac catheterization at [facility] in the 90's  He does have a history of sleep apnea and uses his CPAP consistently  He does have a previous history of palpitations but denies any recent palpitations  He tells me that his last episode of chest pain occurred approximately 2-3 weeks ago  He does note that his blood pressures have recently been elevated    He tells me that losartan and metoprolol succinate were added in the last year  His memory is impaired and he is scheduled to see neurology next week at [facility]  Patients does notice decreased memory which can also be discussed with neurology          Patient Active Problem List   Diagnosis    Hypertension    Hyperlipidemia    Localized edema    Pain of left lower extremity    Chest pain    Benign prostatic hyperplasia with urinary obstruction    Bipolar depression ([facility] [address] )    Cerebral aneurysm, nonruptured    Congenital hydrocephalus ([facility] )    Epile[facility]    Hypothyroidism    Memory loss    Obstructive sleep apnea    Right upper quadrant abdominal pain     Past Medical History:   Diagnosis Date    Abnormal EKG     Aneurysm of anterior cerebral artery     Anxiety     BPH (benign prostatic hyperplasia)     Chronic migraine     Depression     Disease of thyroid gland     Hydrocephalus (HCC)     Hyperlipidemia     Hypertension     Hypertriglyceridemia     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     Neutropenia (HCC)     Primary thrombocytopenia (HCC)     Seizure ([facility]     Sleep apnea      Social History     Socioeconomic History    Marital status: /Civil Union     Spouse name: Not on file    Number of children: Not on file    Years of education: Not on file    Highest education level: Not on file   Occupational History    Not on file   Tobacco Use    Smoking status: Former Smoker     Years:      Quit date:      Years since quittin 2    Smokeless tobacco: Never Used   Vaping Use    Vaping Use: Never used   Substance and Sexual Activity    Alcohol use: Yes     Comment: extremely rare    Drug use: Never    Sexual activity: Not on file     Comment: defer   Other Topics Concern    Not on file   Social History Narrative    Not on file     Social Determinants of Health     Financial Resource Strain: Not on file   Food Insecurity: Not on file   Transportation Needs: Not on file   Physical Activity: Not on file   Stress: Not on file   Social Connections: Not on file   Intimate Partner Violence: Not on file   Housing Stability: Not on file      Family History   Problem Relation Age of Onset    Anuerysm Mother     Colon cancer Father     Hypertension Father     Heart disease Sister     Heart attack Sister     Breast cancer Sister     Stomach cancer Brother     Testicular cancer Brother     Skin cancer Brother     Diabetes Son     Thyroid disease Daughter     Cleft palate Daughter     Cleft lip Daughter      Past Surgical History:   Procedure Laterality Date    BRAIN SURGERY      CARDIAC CATHETERIZATION      CHOLECYSTECTOMY      VENTRICULOPERITONEAL SHUNT       * Current medications reviewed  See AVS      Allergies   Allergen Reactions    Ampicillin Other (See Comments)     "low blood count"    Bactrim [Sulfamethoxazole-Trimethoprim] Other (See Comments)     "low blood count"    Bupropion Hives    Ciprofloxacin Hives     ?fasiculations, nausea      Hydromorphone Hives and Itching    Nafcillin Other (See Comments)     WBC count down         Cardiac Test Results:     ECG 07/28/2021:  Sinus bradycardia  Rightward axis  Poor anterior R-wave progression  Lipid panel 5/14/2021: C 136  T 238  H 28  L 71  Review of Systems:    Review of Systems   Constitutional: Negative for activity change, appetite change and fatigue  HENT: Negative for congestion, hearing loss, tinnitus and trouble swallowing  Eyes: Negative for visual disturbance  Respiratory: Positive for shortness of breath ( with episodes of chest pain)  Negative for cough, chest tightness and wheezing  Cardiovascular: Positive for chest pain (sharp,grabbing pain) and leg swelling (L > R)  Negative for palpitations  Gastrointestinal: Negative for abdominal distention, abdominal pain, nausea and vomiting     Genitourinary: Negative for difficulty urinating  Musculoskeletal: Negative for arthralgias  Skin: Negative for rash  Neurological: Positive for numbness (Left arm)  Negative for dizziness, syncope and light-headedness  Memory loss   Hematological: Does not bruise/bleed easily  Psychiatric/Behavioral: Negative for confusion  The patient is not nervous/anxious  All other systems reviewed and are negative  Vitals:    09/22/21 1105 09/22/21 1217   BP: 146/90 154/88   BP Location: Left arm    Patient Position: Sitting    Cuff Size: Standard    Pulse: (!) 51    Temp: 98 2 °F (36 8 °C)    SpO2: 97%    Weight: 93 4 kg (206 lb)    Height: 5' 9" (1 753 m)      Vitals:    09/22/21 1105   Weight: 93 4 kg (206 lb)     Height: 5' 9" (175 3 cm)     Physical Exam  Vitals reviewed  Constitutional:       Appearance: He is well-developed  HENT:      Head: Normocephalic and atraumatic  Eyes:      Conjunctiva/sclera: Conjunctivae normal       Pupils: Pupils are equal, round, and reactive to light  Neck:      Vascular: No JVD  Cardiovascular:      Rate and Rhythm: Regular rhythm  Bradycardia present  Heart sounds: Normal heart sounds  No murmur heard  No friction rub  No gallop  Pulmonary:      Effort: Pulmonary effort is normal       Breath sounds: Normal breath sounds  Abdominal:      General: Bowel sounds are normal       Palpations: Abdomen is soft  Musculoskeletal:      Cervical back: Normal range of motion  Right lower leg: Edema present  Left lower leg: Edema (L > R) present  Skin:     General: Skin is warm and dry  Neurological:      Mental Status: He is alert and oriented to person, place, and time     Psychiatric:         Mood and Affect: Mood normal          Behavior: Behavior normal

## 2021-09-22 NOTE — PATIENT INSTRUCTIONS
I would recommend Lexiscan nuclear stress test as discussed  Echocardiogram to assess heart structure and function  Discontinue metoprolol succinate and losartan 50 milligrams daily  Start losartan 100/hydrochlorothiazide 25 milligrams daily  Blood work today and repeat in 3 weeks  Ultrasound of the left lower extremity today at 2 p m to ruke out clot

## 2021-09-23 PROCEDURE — 93971 EXTREMITY STUDY: CPT | Performed by: SURGERY

## 2021-09-24 ENCOUNTER — TELEPHONE (OUTPATIENT)
Dept: CARDIOLOGY CLINIC | Facility: CLINIC | Age: 61
End: 2021-09-24

## 2021-09-24 NOTE — TELEPHONE ENCOUNTER
Wife states that she took his BP around 1 pm and it was 137/101  and then 7 mins later it was 155/110   States that he feels lightheaded, unsteady on his feet  States that his hands and feet are tingly  States that she just took it again, and it was 148/95   Asking what to do  States that he was just prescribed a new medication  Stopped Dr Kirti Daly and asked her  Dr Kirti Daly would like wife to have pt take half of his metoprolol succinate and give us a call back in a couple hours to see how he is  Advised wife that if pt would develop chest pain or SOB, or if these symptoms were to get worse he is to go to the ER

## 2021-09-24 NOTE — TELEPHONE ENCOUNTER
Pt wife , Doron Novoa called and stated that his 136/98 HR 91 and he is feeling ok with no symptoms currently

## 2021-09-27 ENCOUNTER — TELEPHONE (OUTPATIENT)
Dept: CARDIOLOGY CLINIC | Facility: CLINIC | Age: 61
End: 2021-09-27

## 2021-09-27 NOTE — TELEPHONE ENCOUNTER
Pt's BP readings 9/25 10:19 AM pulse 70 bp 120/85     2:38 PM Pulse 91  /89 before he took 1/2 metoprolol  6:45 PM Pulse 66 /92   11:00 PM Pulse 59  /80     9/26 10:44 AM Pulse 55  79   3:32 PM  Pulse 56  /99   10:13 PM Pulse 55  /79  No metoprolo given yesterday  9/27  9:24 AM  Pulse 73  /108  Yohannes Brennan wants to know when is the best time to give him is metoprolol also his leg is still bothering him  Call Yohannes Brennan with any questions or concerns  [phone]

## 2021-09-28 ENCOUNTER — HOSPITAL ENCOUNTER (EMERGENCY)
Facility: HOSPITAL | Age: 61
Discharge: HOME | End: 2021-09-28
Attending: EMERGENCY MEDICINE
Payer: MEDICARE

## 2021-09-28 VITALS
OXYGEN SATURATION: 100 % | RESPIRATION RATE: 20 BRPM | WEIGHT: 205.91 LBS | BODY MASS INDEX: 30.5 KG/M2 | TEMPERATURE: 97.1 F | HEART RATE: 98 BPM | SYSTOLIC BLOOD PRESSURE: 145 MMHG | HEIGHT: 69 IN | DIASTOLIC BLOOD PRESSURE: 86 MMHG

## 2021-09-28 DIAGNOSIS — I10 ESSENTIAL HYPERTENSION: Primary | ICD-10-CM

## 2021-09-28 DIAGNOSIS — E87.6 HYPOKALEMIA: ICD-10-CM

## 2021-09-28 LAB
ANION GAP SERPL CALCULATED.3IONS-SCNC: 11 MMOL/L (ref 4–13)
BASOPHILS # BLD AUTO: 0.07 THOUSANDS/ΜL (ref 0–0.1)
BASOPHILS NFR BLD AUTO: 1 % (ref 0–1)
BUN SERPL-MCNC: 14 MG/DL (ref 5–25)
CALCIUM SERPL-MCNC: 8.3 MG/DL (ref 8.3–10.1)
CHLORIDE SERPL-SCNC: 106 MMOL/L (ref 100–108)
CO2 SERPL-SCNC: 27 MMOL/L (ref 21–32)
CREAT SERPL-MCNC: 1.12 MG/DL (ref 0.6–1.3)
EOSINOPHIL # BLD AUTO: 0.1 THOUSAND/ΜL (ref 0–0.61)
EOSINOPHIL NFR BLD AUTO: 2 % (ref 0–6)
ERYTHROCYTE [DISTWIDTH] IN BLOOD BY AUTOMATED COUNT: 13.1 % (ref 11.6–15.1)
GFR SERPL CREATININE-BSD FRML MDRD: 72 ML/MIN/1.73SQ M
GLUCOSE SERPL-MCNC: 135 MG/DL (ref 65–140)
HCT VFR BLD AUTO: 43.3 % (ref 36.5–49.3)
HGB BLD-MCNC: 14.9 G/DL (ref 12–17)
IMM GRANULOCYTES # BLD AUTO: 0.03 THOUSAND/UL (ref 0–0.2)
IMM GRANULOCYTES NFR BLD AUTO: 1 % (ref 0–2)
LYMPHOCYTES # BLD AUTO: 2.02 THOUSANDS/ΜL (ref 0.6–4.47)
LYMPHOCYTES NFR BLD AUTO: 32 % (ref 14–44)
MCH RBC QN AUTO: 31.5 PG (ref 26.8–34.3)
MCHC RBC AUTO-ENTMCNC: 34.4 G/DL (ref 31.4–37.4)
MCV RBC AUTO: 92 FL (ref 82–98)
MONOCYTES # BLD AUTO: 0.72 THOUSAND/ΜL (ref 0.17–1.22)
MONOCYTES NFR BLD AUTO: 11 % (ref 4–12)
NEUTROPHILS # BLD AUTO: 3.41 THOUSANDS/ΜL (ref 1.85–7.62)
NEUTS SEG NFR BLD AUTO: 53 % (ref 43–75)
NRBC BLD AUTO-RTO: 0 /100 WBCS
PLATELET # BLD AUTO: 200 THOUSANDS/UL (ref 149–390)
PMV BLD AUTO: 9.9 FL (ref 8.9–12.7)
POTASSIUM SERPL-SCNC: 3.1 MMOL/L (ref 3.5–5.3)
RBC # BLD AUTO: 4.73 MILLION/UL (ref 3.88–5.62)
SODIUM SERPL-SCNC: 144 MMOL/L (ref 136–145)
TROPONIN I SERPL-MCNC: <0.02 NG/ML
WBC # BLD AUTO: 6.35 THOUSAND/UL (ref 4.31–10.16)

## 2021-09-28 PROCEDURE — 85025 COMPLETE CBC W/AUTO DIFF WBC: CPT | Performed by: EMERGENCY MEDICINE

## 2021-09-28 PROCEDURE — 93005 ELECTROCARDIOGRAM TRACING: CPT

## 2021-09-28 PROCEDURE — 99285 EMERGENCY DEPT VISIT HI MDM: CPT | Performed by: EMERGENCY MEDICINE

## 2021-09-28 PROCEDURE — 84484 ASSAY OF TROPONIN QUANT: CPT | Performed by: EMERGENCY MEDICINE

## 2021-09-28 PROCEDURE — 36415 COLL VENOUS BLD VENIPUNCTURE: CPT | Performed by: EMERGENCY MEDICINE

## 2021-09-28 PROCEDURE — 80048 BASIC METABOLIC PNL TOTAL CA: CPT | Performed by: EMERGENCY MEDICINE

## 2021-09-28 PROCEDURE — 99283 EMERGENCY DEPT VISIT LOW MDM: CPT

## 2021-09-28 RX ORDER — POTASSIUM CHLORIDE 20 MEQ/1
40 TABLET, EXTENDED RELEASE ORAL ONCE
Status: COMPLETED | OUTPATIENT
Start: 2021-09-28 | End: 2021-09-28

## 2021-09-28 RX ORDER — POTASSIUM CHLORIDE 20 MEQ/1
20 TABLET, EXTENDED RELEASE ORAL DAILY
Qty: 5 TABLET | Refills: 0 | Status: SHIPPED | OUTPATIENT
Start: 2021-09-28 | End: 2021-10-22 | Stop reason: ALTCHOICE

## 2021-09-28 RX ADMIN — POTASSIUM CHLORIDE 40 MEQ: 1500 TABLET, EXTENDED RELEASE ORAL at 19:06

## 2021-09-28 RX ADMIN — LOSARTAN POTASSIUM: 50 TABLET, FILM COATED ORAL at 19:08

## 2021-09-29 NOTE — TELEPHONE ENCOUNTER
Please see my result note  I reviewed the ER records  Please have him bring in home BP cuff to compare readings as ER reading was lower than he is getting at home  We may need to adjust medication     Doppler of leg was negative for clot (wanted to make sure they are aware)

## 2021-09-30 ENCOUNTER — TELEPHONE (OUTPATIENT)
Dept: CARDIOLOGY CLINIC | Facility: CLINIC | Age: 61
End: 2021-09-30

## 2021-09-30 NOTE — TELEPHONE ENCOUNTER
----- Message from Neil Daigle 83 sent at 9/29/2021  5:58 PM EDT -----  Please notify pt that his kidney function and electrolytes are in normal range by labs done last week (I see he needed potassium supplement in ER and will place order to recheck before his visit with me on 10/22  His blood count was normal and BNP (test for fluid stretch in the heart) was slightly elevated, which could be do to elevated blood pressures  Please have them bring his BP cuff in to compare accuracy  We may need to adjust medication based on readings

## 2021-10-03 LAB
ATRIAL RATE: 93 BPM
P AXIS: 70 DEGREES
PR INTERVAL: 148 MS
QRS AXIS: -54 DEGREES
QRSD INTERVAL: 94 MS
QT INTERVAL: 400 MS
QTC INTERVAL: 497 MS
T WAVE AXIS: 17 DEGREES
VENTRICULAR RATE: 93 BPM

## 2021-10-03 PROCEDURE — 93010 ELECTROCARDIOGRAM REPORT: CPT | Performed by: INTERNAL MEDICINE

## 2021-10-04 ENCOUNTER — HOSPITAL ENCOUNTER (OUTPATIENT)
Dept: NUCLEAR MEDICINE | Facility: HOSPITAL | Age: 61
Discharge: HOME | End: 2021-10-04
Attending: INTERNAL MEDICINE
Payer: MEDICARE

## 2021-10-04 ENCOUNTER — HOSPITAL ENCOUNTER (OUTPATIENT)
Dept: NUCLEAR MEDICINE | Facility: HOSPITAL | Age: 61
Discharge: HOME | End: 2021-10-04
Attending: INTERNAL MEDICINE

## 2021-10-04 DIAGNOSIS — R07.9 CHEST PAIN, UNSPECIFIED TYPE: ICD-10-CM

## 2021-10-04 DIAGNOSIS — I10 ESSENTIAL HYPERTENSION: ICD-10-CM

## 2021-10-04 DIAGNOSIS — E78.2 MIXED HYPERLIPIDEMIA: ICD-10-CM

## 2021-10-04 DIAGNOSIS — R60.0 LOCALIZED EDEMA: ICD-10-CM

## 2021-10-04 PROCEDURE — A9502 TC99M TETROFOSMIN: HCPCS

## 2021-10-04 PROCEDURE — G1004 CDSM NDSC: HCPCS

## 2021-10-04 PROCEDURE — 93017 CV STRESS TEST TRACING ONLY: CPT

## 2021-10-04 PROCEDURE — 78452 HT MUSCLE IMAGE SPECT MULT: CPT

## 2021-10-04 RX ADMIN — REGADENOSON 0.4 MG: 0.08 INJECTION, SOLUTION INTRAVENOUS at 11:40

## 2021-10-05 PROCEDURE — 93016 CV STRESS TEST SUPVJ ONLY: CPT | Performed by: INTERNAL MEDICINE

## 2021-10-05 PROCEDURE — 78452 HT MUSCLE IMAGE SPECT MULT: CPT | Performed by: INTERNAL MEDICINE

## 2021-10-05 PROCEDURE — 93018 CV STRESS TEST I&R ONLY: CPT | Performed by: INTERNAL MEDICINE

## 2021-10-11 ENCOUNTER — TELEPHONE (OUTPATIENT)
Dept: CARDIOLOGY CLINIC | Facility: CLINIC | Age: 61
End: 2021-10-11

## 2021-10-11 LAB
CHEST PAIN STATEMENT: NORMAL
MAX DIASTOLIC BP: 74 MMHG
MAX HEART RATE: 123 BPM
MAX PREDICTED HEART RATE: 161 BPM
MAX. SYSTOLIC BP: 110 MMHG
PROTOCOL NAME: NORMAL
REASON FOR TERMINATION: NORMAL
TARGET HR FORMULA: NORMAL
TEST INDICATION: NORMAL
TIME IN EXERCISE PHASE: NORMAL

## 2021-10-22 ENCOUNTER — OFFICE VISIT (OUTPATIENT)
Dept: CARDIOLOGY CLINIC | Facility: CLINIC | Age: 61
End: 2021-10-22
Payer: MEDICARE

## 2021-10-22 VITALS
SYSTOLIC BLOOD PRESSURE: 125 MMHG | DIASTOLIC BLOOD PRESSURE: 77 MMHG | TEMPERATURE: 97.5 F | RESPIRATION RATE: 14 BRPM | HEART RATE: 90 BPM | OXYGEN SATURATION: 96 % | BODY MASS INDEX: 30.29 KG/M2 | HEIGHT: 69 IN | WEIGHT: 204.5 LBS

## 2021-10-22 DIAGNOSIS — R60.0 LOCALIZED EDEMA: ICD-10-CM

## 2021-10-22 DIAGNOSIS — I10 PRIMARY HYPERTENSION: Primary | ICD-10-CM

## 2021-10-22 DIAGNOSIS — R10.11 RUQ PAIN: ICD-10-CM

## 2021-10-22 DIAGNOSIS — R07.9 CHEST PAIN, UNSPECIFIED TYPE: ICD-10-CM

## 2021-10-22 DIAGNOSIS — G47.33 OBSTRUCTIVE SLEEP APNEA: ICD-10-CM

## 2021-10-22 DIAGNOSIS — M79.605 PAIN OF LEFT LOWER EXTREMITY: ICD-10-CM

## 2021-10-22 DIAGNOSIS — E78.2 MIXED HYPERLIPIDEMIA: ICD-10-CM

## 2021-10-22 PROCEDURE — 99214 OFFICE O/P EST MOD 30 MIN: CPT | Performed by: NURSE PRACTITIONER

## 2021-10-22 RX ORDER — MIRTAZAPINE 45 MG/1
45 TABLET, ORALLY DISINTEGRATING ORAL
COMMUNITY
Start: 2021-09-27

## 2021-10-22 RX ORDER — OMEGA-3S/DHA/EPA/FISH OIL/D3 300MG-1000
2 CAPSULE ORAL DAILY
COMMUNITY

## 2021-10-24 PROBLEM — F31.9 BIPOLAR DEPRESSION (HCC): Status: ACTIVE | Noted: 2018-09-14

## 2021-10-27 ENCOUNTER — APPOINTMENT (OUTPATIENT)
Dept: LAB | Facility: HOSPITAL | Age: 61
End: 2021-10-27
Payer: MEDICARE

## 2021-10-27 DIAGNOSIS — E87.6 HYPOKALEMIA: ICD-10-CM

## 2021-10-27 LAB
ANION GAP SERPL CALCULATED.3IONS-SCNC: 9 MMOL/L (ref 4–13)
BUN SERPL-MCNC: 16 MG/DL (ref 5–25)
CALCIUM SERPL-MCNC: 8.6 MG/DL (ref 8.3–10.1)
CHLORIDE SERPL-SCNC: 102 MMOL/L (ref 100–108)
CO2 SERPL-SCNC: 30 MMOL/L (ref 21–32)
CREAT SERPL-MCNC: 1.34 MG/DL (ref 0.6–1.3)
GFR SERPL CREATININE-BSD FRML MDRD: 58 ML/MIN/1.73SQ M
GLUCOSE SERPL-MCNC: 94 MG/DL (ref 65–140)
POTASSIUM SERPL-SCNC: 3.2 MMOL/L (ref 3.5–5.3)
SODIUM SERPL-SCNC: 141 MMOL/L (ref 136–145)

## 2021-10-27 PROCEDURE — 80048 BASIC METABOLIC PNL TOTAL CA: CPT

## 2021-10-27 PROCEDURE — 36415 COLL VENOUS BLD VENIPUNCTURE: CPT

## 2021-10-28 ENCOUNTER — TELEPHONE (OUTPATIENT)
Dept: CARDIOLOGY CLINIC | Facility: CLINIC | Age: 61
End: 2021-10-28

## 2021-11-16 ENCOUNTER — TELEPHONE (OUTPATIENT)
Dept: CARDIOLOGY CLINIC | Facility: CLINIC | Age: 61
End: 2021-11-16

## 2021-11-16 ENCOUNTER — APPOINTMENT (OUTPATIENT)
Dept: LAB | Facility: HOSPITAL | Age: 61
End: 2021-11-16
Attending: INTERNAL MEDICINE
Payer: MEDICARE

## 2021-11-16 ENCOUNTER — HOSPITAL ENCOUNTER (OUTPATIENT)
Dept: NON INVASIVE DIAGNOSTICS | Facility: HOSPITAL | Age: 61
Discharge: HOME | End: 2021-11-16
Attending: INTERNAL MEDICINE
Payer: MEDICARE

## 2021-11-16 VITALS
BODY MASS INDEX: 30.21 KG/M2 | WEIGHT: 204 LBS | SYSTOLIC BLOOD PRESSURE: 145 MMHG | HEART RATE: 65 BPM | HEIGHT: 69 IN | DIASTOLIC BLOOD PRESSURE: 86 MMHG

## 2021-11-16 DIAGNOSIS — F41.1 GENERALIZED ANXIETY DISORDER: ICD-10-CM

## 2021-11-16 DIAGNOSIS — R07.9 CHEST PAIN, UNSPECIFIED TYPE: ICD-10-CM

## 2021-11-16 DIAGNOSIS — E87.6 HYPOKALEMIA: ICD-10-CM

## 2021-11-16 DIAGNOSIS — R60.0 LOCALIZED EDEMA: ICD-10-CM

## 2021-11-16 DIAGNOSIS — I10 ESSENTIAL HYPERTENSION: ICD-10-CM

## 2021-11-16 DIAGNOSIS — E78.2 MIXED HYPERLIPIDEMIA: ICD-10-CM

## 2021-11-16 DIAGNOSIS — F33.41 MAJOR DEPRESSIVE DISORDER, RECURRENT, IN PARTIAL REMISSION (HCC): Primary | ICD-10-CM

## 2021-11-16 LAB
ANION GAP SERPL CALCULATED.3IONS-SCNC: 9 MMOL/L (ref 4–13)
AORTIC ROOT: 2.8 CM
APICAL FOUR CHAMBER EJECTION FRACTION: 58 %
ASCENDING AORTA: 2.7 CM
BUN SERPL-MCNC: 14 MG/DL (ref 5–25)
CALCIUM SERPL-MCNC: 8.7 MG/DL (ref 8.3–10.1)
CHLORIDE SERPL-SCNC: 103 MMOL/L (ref 100–108)
CO2 SERPL-SCNC: 30 MMOL/L (ref 21–32)
CREAT SERPL-MCNC: 1.32 MG/DL (ref 0.6–1.3)
E WAVE DECELERATION TIME: 212 MS
FRACTIONAL SHORTENING: 31 % (ref 28–44)
GFR SERPL CREATININE-BSD FRML MDRD: 59 ML/MIN/1.73SQ M
GLUCOSE P FAST SERPL-MCNC: 107 MG/DL (ref 65–99)
INTERVENTRICULAR SEPTUM IN DIASTOLE (PARASTERNAL SHORT AXIS VIEW): 1 CM
IVC: 1.4 MM
LAAS-AP4: 14.7 CM2
LEFT INTERNAL DIMENSION IN SYSTOLE: 2.4 CM (ref 2.1–4)
LEFT VENTRICULAR INTERNAL DIMENSION IN DIASTOLE: 3.5 CM (ref 5.72–8.52)
LEFT VENTRICULAR POSTERIOR WALL IN END DIASTOLE: 1.1 CM
LEFT VENTRICULAR STROKE VOLUME: 29 ML
MV E'TISSUE VEL-LAT: 11 CM/S
MV E'TISSUE VEL-SEP: 9 CM/S
MV PEAK A VEL: 0.76 M/S
MV PEAK E VEL: 60 CM/S
MV STENOSIS PRESSURE HALF TIME: 0 MS
POTASSIUM SERPL-SCNC: 3 MMOL/L (ref 3.5–5.3)
RA PRESSURE ESTIMATED: 3 MMHG
RIGHT ATRIAL 2D VOLUME: 24 ML
RIGHT ATRIUM AREA SYSTOLE A4C: 11.3 CM2
RIGHT VENTRICLE ID DIMENSION: 2.2 CM
SL CV LV EF: 58
SL CV PED ECHO LEFT VENTRICLE DIASTOLIC VOLUME (MOD BIPLANE) 2D: 50 ML
SL CV PED ECHO LEFT VENTRICLE SYSTOLIC VOLUME (MOD BIPLANE) 2D: 20 ML
SODIUM SERPL-SCNC: 142 MMOL/L (ref 136–145)
TRICUSPID ANNULAR PLANE SYSTOLIC EXCURSION: 1.8 CM
TRICUSPID VALVE S': 47 CM/S
VALPROATE SERPL-MCNC: 42 UG/ML (ref 50–100)
Z-SCORE OF LEFT VENTRICULAR DIMENSION IN END SYSTOLE: -6.9

## 2021-11-16 PROCEDURE — 93306 TTE W/DOPPLER COMPLETE: CPT

## 2021-11-16 PROCEDURE — 80164 ASSAY DIPROPYLACETIC ACD TOT: CPT

## 2021-11-16 PROCEDURE — 80048 BASIC METABOLIC PNL TOTAL CA: CPT

## 2021-11-16 PROCEDURE — 36415 COLL VENOUS BLD VENIPUNCTURE: CPT

## 2021-11-17 ENCOUNTER — TELEPHONE (OUTPATIENT)
Dept: CARDIOLOGY CLINIC | Facility: CLINIC | Age: 61
End: 2021-11-17

## 2022-01-26 ENCOUNTER — HOSPITAL ENCOUNTER (OUTPATIENT)
Dept: RADIOLOGY | Facility: HOSPITAL | Age: 62
Discharge: HOME | End: 2022-01-26
Payer: MEDICARE

## 2022-01-26 DIAGNOSIS — M54.16 LUMBAR RADICULOPATHY: ICD-10-CM

## 2022-01-26 PROCEDURE — 72100 X-RAY EXAM L-S SPINE 2/3 VWS: CPT

## 2022-01-28 ENCOUNTER — OFFICE VISIT (OUTPATIENT)
Dept: CARDIOLOGY CLINIC | Facility: CLINIC | Age: 62
End: 2022-01-28
Payer: MEDICARE

## 2022-01-28 VITALS
WEIGHT: 200.8 LBS | HEIGHT: 69 IN | BODY MASS INDEX: 29.74 KG/M2 | OXYGEN SATURATION: 96 % | SYSTOLIC BLOOD PRESSURE: 118 MMHG | DIASTOLIC BLOOD PRESSURE: 76 MMHG | HEART RATE: 81 BPM

## 2022-01-28 DIAGNOSIS — E78.2 MIXED HYPERLIPIDEMIA: ICD-10-CM

## 2022-01-28 DIAGNOSIS — R10.11 RIGHT UPPER QUADRANT ABDOMINAL PAIN: ICD-10-CM

## 2022-01-28 DIAGNOSIS — I10 PRIMARY HYPERTENSION: Primary | ICD-10-CM

## 2022-01-28 DIAGNOSIS — M79.605 PAIN OF LEFT LOWER EXTREMITY: ICD-10-CM

## 2022-01-28 DIAGNOSIS — R07.9 CHEST PAIN, UNSPECIFIED TYPE: ICD-10-CM

## 2022-01-28 DIAGNOSIS — R60.0 LOCALIZED EDEMA: ICD-10-CM

## 2022-01-28 DIAGNOSIS — G47.33 OBSTRUCTIVE SLEEP APNEA: ICD-10-CM

## 2022-01-28 PROCEDURE — 99214 OFFICE O/P EST MOD 30 MIN: CPT | Performed by: INTERNAL MEDICINE

## 2022-01-28 NOTE — PROGRESS NOTES
Cardiology Office Visit    [facility]  [id number]  1960    [facility] [place]  [address]  [place] [address]      Dear Jessie Pettit MD,    I had the pleasure of seeing your patient at our [address] Cardiology [facility] office today 2022  As you know he is a pleasant 62y o  year old male with a medical history as described below  Reason for office visit:         There are no diagnoses linked to this encounter           HPI     Patient Active Problem List   Diagnosis    Hypertension    Hyperlipidemia    Localized edema    Pain of left lower extremity    Chest pain    Benign prostatic hyperplasia with urinary obstruction    Bipolar depression ([address] )    Cerebral aneurysm, nonruptured    Congenital hydrocephalus ([address] )    Epileptic seizure ([address] )    Hypothyroidism    Memory loss    Obstructive sleep apnea     Past Medical History:   Diagnosis Date    Abnormal EKG     Aneurysm of anterior cerebral artery     Anxiety     BPH (benign prostatic hyperplasia)     Chronic migraine     Depression     Disease of thyroid gland     Hydrocephalus (HCC)     Hyperlipidemia     Hypertension     Hypertriglyceridemia     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     Neutropenia (HCC)     Primary thrombocytopenia (HCC)     Seizure ([address] )     Sleep apnea      Social History     Socioeconomic History    Marital status: /Civil Union     Spouse name: Not on file    Number of children: Not on file    Years of education: Not on file    Highest education level: Not on file   Occupational History    Not on file   Tobacco Use    Smoking status: Former Smoker     Years:      Quit date:      Years since quittin 0    Smokeless tobacco: Never Used   Vaping Use    Vaping Use: Never used   Substance and Sexual Activity    Alcohol use: Yes     Comment: extremely rare    Drug use: Never    Sexual activity: Not on file     Comment: defer   Other Topics Concern    Not on file   Social History Narrative    Not on file     Social Determinants of Health     Financial Resource Strain: Not on file   Food Insecurity: Not on file   Transportation Needs: Not on file   Physical Activity: Not on file   Stress: Not on file   Social Connections: Not on file   Intimate Partner Violence: Not on file   Housing Stability: Not on file      Family History   Problem Relation Age of Onset    Anuerysm Mother     Colon cancer Father     Hypertension Father     Heart disease Sister     Heart attack Sister     Breast cancer Sister     Stomach cancer Brother     Testicular cancer Brother     Skin cancer Brother     Diabetes Son     Thyroid disease Daughter     Cleft palate Daughter     Cleft lip Daughter      Past Surgical History:   Procedure Laterality Date    BRAIN SURGERY      CARDIAC CATHETERIZATION      CHOLECYSTECTOMY      VENTRICULOPERITONEAL SHUNT         Current Outpatient Medications:     Cholecalciferol (Vitamin D3) 10 MCG (400 UNIT) CHEW, Chew 2 tablets daily, Disp: , Rfl:     citalopram (CeleXA) 40 mg tablet, Take 40 mg by mouth daily , Disp: , Rfl:     divalproex sodium (DEPAKOTE) 500 mg EC tablet, Take 500 mg by mouth every 12 (twelve) hours , Disp: , Rfl:     Levothyroxine Sodium 125 MCG CAPS, Take 125 mcg by mouth daily , Disp: , Rfl:     losartan-hydrochlorothiazide (HYZAAR) 100-25 MG per tablet, Take 1 tablet by mouth daily, Disp: 30 tablet, Rfl: 11    memantine (NAMENDA) 10 mg tablet, Take 10 mg by mouth 2 (two) times a day , Disp: , Rfl:     mirtazapine (REMERON SOL-TAB) 45 mg dispersible tablet, Take 45 mg by mouth daily at bedtime, Disp: , Rfl:     POTASSIUM PO, Take 500 mg by mouth in the morning, Disp: , Rfl:     pravastatin (PRAVACHOL) 80 mg tablet, Take 80 mg by mouth daily , Disp: , Rfl:     Probiotic Product (PRO-BIOTIC BLEND PO), Take 1 tablet by mouth daily , Disp: , Rfl:     tamsulosin (FLOMAX) 0 4 mg, Take 0 4 mg by mouth daily with dinner , Disp: , Rfl:     topiramate (TOPAMAX) 100 mg tablet, Take 100 mg by mouth 2 (two) times a day , Disp: , Rfl:     cholestyramine (QUESTRAN) 4 g packet, Take 1 packet by mouth daily as needed  (Patient not taking: Reported on 1/28/2022 ), Disp: , Rfl:   Allergies   Allergen Reactions    Ampicillin Other (See Comments)     "low blood count"    Bactrim [Sulfamethoxazole-Trimethoprim] Other (See Comments)     "low blood count"    Bupropion Hives    Ciprofloxacin Hives     ?fasiculations, nausea      Hydromorphone Hives and Itching    Nafcillin Other (See Comments)     WBC count down           Labs:{Recent NJIP:[id number]::"SRW applicable"}     Imaging: No results found  ECG: ***    Review of Systems:    Review of Systems      Vitals:    01/28/22 1531   BP: 102/60   Pulse: 81   SpO2: 96%   Weight: 91 1 kg (200 lb 12 8 oz)   Height: 5' 9" (1 753 m)     Vitals:    01/28/22 1531   Weight: 91 1 kg (200 lb 12 8 oz)     Height: 5' 9" (175 3 cm)     Physical Exam  Constitutional:       Appearance: He is well-developed  HENT:      Head: Normocephalic and atraumatic  Eyes:      Conjunctiva/sclera: Conjunctivae normal       Pupils: Pupils are equal, round, and reactive to light  Neck:      Vascular: No JVD  Cardiovascular:      Rate and Rhythm: Normal rate and regular rhythm  Heart sounds: Normal heart sounds  No murmur heard  No friction rub  No gallop  Pulmonary:      Effort: Pulmonary effort is normal       Breath sounds: Normal breath sounds  Abdominal:      General: Bowel sounds are normal       Palpations: Abdomen is soft  Musculoskeletal:      Cervical back: Normal range of motion  Right lower leg: Edema present  Skin:     General: Skin is warm and dry  Neurological:      Mental Status: He is alert and oriented to person, place, and time     Psychiatric:         Behavior: Behavior normal

## 2022-01-28 NOTE — PATIENT INSTRUCTIONS
Echocardiogram and stress test looked great (both normal)  Blood pressure today is perfect  Would recommend increasing water intake  Add compression stockings/socks 15-20 mmHg  Call me with any change in symptoms  I would recommend adding magnesium 250 mg over the counter

## 2022-02-22 NOTE — ASSESSMENT & PLAN NOTE
History of cramping pain in his left lower leg  Venous doppler completed 9/22/2021 was negative for DVT  Pain has resolved at present  Recommend adding OTC magnesium 250 mg daily

## 2022-02-22 NOTE — ASSESSMENT & PLAN NOTE
Recent history of labile blood pressure  Blood pressure is well controlled today here in the office  BP well controlled at home  BP a bit low today without any lightheadedness or dizziness  Confirmed accuracy of home BP cuff at last office visit  Continue Losartan-HCTZ 100-25mg daily  Continue 2 g sodium diet and regular moderate activity as tolerated  Will continue to monitor

## 2022-02-22 NOTE — ASSESSMENT & PLAN NOTE
Lipid panel 5/14/2021:     HDL 28  LDL 71  Pravastatin 80mg daily  Triglycerides above goal    Will monitor and consider medication adjustment as needed  Reviewed dietary modification for triglyceride control  If triglycerides remain elevated we can consider the addition of fenofibrate

## 2022-02-22 NOTE — PROGRESS NOTES
Cardiology Office Visit    Alec Rochester  [id number]  1960    [facility]  [address] [facility] [address]  [address]      Dear Kelly Smith MD,    I had the pleasure of seeing your patient at our [address] [facility] office today 1/28/2022  As you know he is a pleasant 62y o  year old male with a medical history as described below  Reason for office visit: Follow up chest pain, hypertension and hyperlipidemia  1  Primary hypertension  Assessment & Plan:  Recent history of labile blood pressure  Blood pressure is well controlled today here in the office  BP well controlled at home  BP a bit low today without any lightheadedness or dizziness  Confirmed accuracy of home BP cuff at last office visit  Continue Losartan-HCTZ 100-25mg daily  Continue 2 g sodium diet and regular moderate activity as tolerated  Will continue to monitor  2  Mixed hyperlipidemia  Assessment & Plan:  Lipid panel 5/14/2021:     HDL 28  LDL 71  Pravastatin 80mg daily  Triglycerides above goal    Will monitor and consider medication adjustment as needed  Reviewed dietary modification for triglyceride control  If triglycerides remain elevated we can consider the addition of fenofibrate  3  Chest pain, unspecified type  Assessment & Plan:  Pt reported 2 separate types of chest pain:  He reported history of a mid sternal pressure which had not occurred in quite some time  He also reported intermittent sharp, jabbing pain at his left sternal border that occured when lying down  He previously noticed this in the morning but now only at night when lying down for bed  He has not experienced this pain recently  [facility] nuclear stress test performed 10/4/2021 was normal without evidence of ischemia    HR was elevated initially and metoprolol was added, but he did not tolerate due to bradycardia and it was discontinued  HR's have been much improved off of metoprolol  Echocardiogram 11/16/2021 was unremarkable  Continue with BP, lipid and glucose control  Will monitor for recurrent symptoms  Reviewed urgent s/s to report  4  Localized edema  Assessment & Plan:  History of lower leg edema  This is currently improved with addition of HCTZ  Tolerating HCTZ 25 mg daily  Echocardiogram 11/16/2021 was unremarkable with normal LVEF and normal diastolic function without any significant valvular abnormalities  Recommend compression socks and leg elevation when sitting for extended periods of time  Report any new/worsening symptoms  5  Pain of left lower extremity  Assessment & Plan:  History of cramping pain in his left lower leg  Venous doppler completed 9/22/2021 was negative for DVT  Pain has resolved at present  Recommend adding OTC magnesium 250 mg daily  6  Obstructive sleep apnea  Assessment & Plan:  Patient reports excellent  CPAP compliance  7  Right upper quadrant abdominal pain           HPI     Patient has a past medical history of hyperlipidemia, hypertension, seizure disorder, thyroid disease, BPH, BEREKET,cerebral aneurysm, IBS, congenital hydrocephalus with  shunt, neuropathy and anxiety  Patient presented to the ER at [facility] 7/28/2021 for evaluation of chest pain  He told his wife that he had left sided chest pain and he tried antacids but did not note any significant change in symptoms  The next day he woke up with chest pain in am and was seen in the ER and given 4 low dose aspirin and nitro and pain resolved  He also had noted some left arm numbness and shortness of breath  Blood pressure was high on presentation  He was noted to be bradycardiac in ER  Troponin x 2 was negative  Patient was discharged home       Patient was evaluated previously [facility] 10/27/2017 for lightheadedness, dizziness and palpitations    9/22/2021: Patient presents to the office for evaluation  His memory is impaired and he is scheduled to see neurology next week at [facility]  Patients does notice decreased memory which can also be discussed with neurology  He describes sharp grabbing pain as well as left hand numbness and tingling  He has had recurrent episodes since seen in the ER  He has noted left leg swelling and pain  He noted right sided low back pain  He did undergo cardiac catheterization in the 90's at [address] [place] [address] in [place]  He does use his CPAP consistently  Last chest pain episode was about 2-3 weeks ago  Most recent lipid panel reviewed  Blood pressure has been labile recently  1/28/2022: Patient returns to the office today for follow up  He was last seen 10/22/2021 by Kris Bonilla at which time results of his initial testing were reviewed (results as outlined below)  Patient reports that his blood pressures have been ok at home with occasional lower readings  He does note some lightheadedness when readings are low  Blood pressures are mainly in the 120/80 range  Heart rates are much better off of metoprolol  He denies any recent chest pain  Occasional heart racing  Still has intermittent lower extremity edema  He also notes 'nerve pain' right burning back pain with burning in his feet  Occasional left arm numbness  No significant shortness of breath or dyspnea on exertion  He continues to have left leg apin intermittently  He is doing speech therapy  Scheduled for MRI next Friday           Patient Active Problem List   Diagnosis    Hypertension    Hyperlipidemia    Localized edema    Pain of left lower extremity    Chest pain    Benign prostatic hyperplasia with urinary obstruction    Bipolar depression [facility]    Cerebral aneurysm, nonruptured    Congenital hydrocephalus ([facility]    Epileptic seizure ([facility] )    Hypothyroidism    Memory loss    Obstructive sleep apnea    Right upper quadrant abdominal pain     Past Medical History: Diagnosis Date    Abnormal EKG     Aneurysm of anterior cerebral artery     Anxiety     BPH (benign prostatic hyperplasia)     Chronic migraine     Depression     Disease of thyroid gland     Hydrocephalus ([facility] )     Hyperlipidemia     Hypertension     Hypertriglyceridemia     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     Neutropenia ([facility] )     Primary thrombocytopenia (HCC)     Seizure ([facility] )     Sleep apnea      Social History     Socioeconomic History    Marital status: /Civil Union     Spouse name: Not on file    Number of children: Not on file    Years of education: Not on file    Highest education level: Not on file   Occupational History    Not on file   Tobacco Use    Smoking status: Former Smoker     Years:      Quit date:      Years since quittin 1    Smokeless tobacco: Never Used   Vaping Use    Vaping Use: Never used   Substance and Sexual Activity    Alcohol use: Yes     Comment: extremely rare    Drug use: Never    Sexual activity: Not on file     Comment: defer   Other Topics Concern    Not on file   Social History Narrative    Not on file     Social Determinants of Health     Financial Resource Strain: Not on file   Food Insecurity: Not on file   Transportation Needs: Not on file   Physical Activity: Not on file   Stress: Not on file   Social Connections: Not on file   Intimate Partner Violence: Not on file   Housing Stability: Not on file      Family History   Problem Relation Age of Onset    Anuerysm Mother     Colon cancer Father     Hypertension Father     Heart disease Sister     Heart attack Sister     Breast cancer Sister     Stomach cancer Brother     Testicular cancer Brother     Skin cancer Brother     Diabetes Son     Thyroid disease Daughter     Cleft palate Daughter     Cleft lip Daughter      Past Surgical History:   Pro[facility] Laterality Date    [facility]      CHOLECYSTECTOMY      VENTRICULOPERITONEAL SHUNT         Current Outpatient Medications:     Cholecalciferol (Vitamin D3) 10 MCG (400 UNIT) CHEW, Chew 2 tablets daily, Disp: , Rfl:     citalopram (CeleXA) 40 mg tablet, Take 40 mg by mouth daily , Disp: , Rfl:     divalproex sodium (DEPAKOTE) 500 mg EC tablet, Take 500 mg by mouth every 12 (twelve) hours , Disp: , Rfl:     Levothyroxine Sodium 125 MCG CAPS, Take 125 mcg by mouth daily , Disp: , Rfl:     losartan-hydrochlorothiazide (HYZAAR) 100-25 MG per tablet, Take 1 tablet by mouth daily, Disp: 30 tablet, Rfl: 11    memantine (NAMENDA) 10 mg tablet, Take 10 mg by mouth 2 (two) times a day , Disp: , Rfl:     mirtazapine (REMERON SOL-TAB) 45 mg dispersible tablet, Take 45 mg by mouth daily at bedtime, Disp: , Rfl:     POTASSIUM PO, Take 500 mg by mouth in the morning, Disp: , Rfl:     pravastatin (PRAVACHOL) 80 mg tablet, Take 80 mg by mouth daily , Disp: , Rfl:     Probiotic Product (PRO-BIOTIC BLEND PO), Take 1 tablet by mouth daily , Disp: , Rfl:     tamsulosin (FLOMAX) 0 4 mg, Take 0 4 mg by mouth daily with dinner , Disp: , Rfl:     topiramate (TOPAMAX) 100 mg tablet, Take 100 mg by mouth 2 (two) times a day , Disp: , Rfl:     cholestyramine (QUESTRAN) 4 g packet, Take 1 packet by mouth daily as needed  (Patient not taking: Reported on 1/28/2022 ), Disp: , Rfl:      Allergies   Allergen Reactions    Ampicillin Other (See Comments)     "low blood count"    Bactrim [Sulfamethoxazole-Trimethoprim] Other (See Comments)     "low blood count"    Bupropion Hives    Ciprofloxacin Hives     ?fasiculations, nausea      Hydromorphone Hives and Itching    Nafcillin Other (See Comments)     WBC count down         Cardiac Test Results:    Echocardiogram 11/16/2021:   EF 58%  Normal diastolic function  No valvular abnormalities  Lexiscan nuclear stress test 10/4/2021:  No evidence of scar or ischemia  EF 56%       Lipid panel 5/14/2021: C 136  T 238  H 28  L 71  Review of Systems:    Review of Systems   Constitutional: Negative for activity change, appetite change and fatigue  HENT: Negative for congestion, hearing loss, tinnitus and trouble swallowing  Eyes: Negative for visual disturbance  Respiratory: Negative for cough, chest tightness, shortness of breath and wheezing  Cardiovascular: Positive for leg swelling  Negative for chest pain and palpitations  Occasional heart racing   Gastrointestinal: Negative for abdominal distention, abdominal pain, nausea and vomiting  Genitourinary: Negative for difficulty urinating  Musculoskeletal: Positive for arthralgias  Burning in feet   Skin: Negative for rash  Neurological: Positive for light-headedness and numbness (occasional left arm numbness)  Negative for dizziness and syncope  Hematological: Does not bruise/bleed easily  Psychiatric/Behavioral: Negative for confusion  The patient is not nervous/anxious  All other systems reviewed and are negative  Vitals:    01/28/22 1531 01/28/22 1554   BP: 102/60 118/76   BP Location:  Left arm   Patient Position:  Sitting   Pulse: 81    SpO2: 96%    Weight: 91 1 kg (200 lb 12 8 oz)    Height: 5' 9" (1 753 m)      Vitals:    01/28/22 1531   Weight: 91 1 kg (200 lb 12 8 oz)     Height: 5' 9" (175 3 cm)     Physical Exam  Vitals reviewed  Constitutional:       Appearance: He is well-developed  HENT:      Head: Normocephalic and atraumatic  Eyes:      Conjunctiva/sclera: Conjunctivae normal       Pupils: Pupils are equal, round, and reactive to light  Neck:      Vascular: No JVD  Cardiovascular:      Rate and Rhythm: Normal rate and regular rhythm  Heart sounds: Normal heart sounds  No murmur heard  No friction rub  No gallop  Pulmonary:      Effort: Pulmonary effort is normal       Breath sounds: Normal breath sounds     Abdominal:      General: Bowel sounds are normal       Palpations: Abdomen is soft  Musculoskeletal:      Cervical back: Normal range of motion  Skin:     General: Skin is warm and dry  Neurological:      Mental Status: He is alert and oriented to person, place, and time  Psychiatric:         Speech: Speech is delayed  Behavior: Behavior normal          Cognition and Memory: Cognition is not impaired  Memory is impaired

## 2022-02-22 NOTE — ASSESSMENT & PLAN NOTE
Pt reported 2 separate types of chest pain:  He reported history of a mid sternal pressure which had not occurred in quite some time  He also reported intermittent sharp, jabbing pain at his left sternal border that occured when lying down  He previously noticed this in the morning but now only at night when lying down for bed  He has not experienced this pain recently  [facility]/2021 was normal without evidence of ischemia  HR was elevated initially and metoprolol was added, but he did not tolerate due to bradycardia and it was discontinued  HR's have been much improved off of metoprolol  Echocardiogram 11/16/2021 was unremarkable  Continue with BP, lipid and glucose control  Will monitor for recurrent symptoms  Reviewed urgent s/s to report

## 2022-02-22 NOTE — ASSESSMENT & PLAN NOTE
History of lower leg edema  This is currently improved with addition of HCTZ  Tolerating HCTZ 25 mg daily  Echocardiogram 11/16/2021 was unremarkable with normal LVEF and normal diastolic function without any significant valvular abnormalities  Recommend compression socks and leg elevation when sitting for extended periods of time  Report any new/worsening symptoms

## 2022-03-24 NOTE — ASSESSMENT & PLAN NOTE
Patient reports bilateral lower extremity edema with left greater than right  He also has noted left leg pain which started approximately 2 weeks ago  I have recommended venous Doppler rule out DVT which has been scheduled  Transitioning from metoprolol and losartan to losartan hydrochlorothiazide to see if this helps with his lower extremity edema  Will check updated blood work including NT proBNP today  Echocardiogram to assess heart structure and function

## 2022-03-24 NOTE — ASSESSMENT & PLAN NOTE
Patient reports episodes of left-sided sharp, grabbing pain with associated left hand numbness and tingling as well as occasional shortness of breath  Patient was evaluated in the emergency room 7/28/2021 and continues to have intermittent episodes of chest pain  He does report prior cardiac catheterization in the 90s (report not available for review)  I have recommended Lexiscan nuclear stress test   Echocardiogram to assess heart structure and function

## 2022-03-24 NOTE — ASSESSMENT & PLAN NOTE
Patient reports recent increase in blood pressure  He is bradycardic on exam today with a heart rate of 51 bpm   He also has bilateral lower extremity edema on exam   I have recommended discontinuing metoprolol succinate as well as losartan  Recommend starting losartan/hydrochlorothiazide 100/25 mg daily  Continue to monitor blood pressures at home and call with any significant side effects with transitioning to new medication  Recommend blood work today and repeat in 3 weeks to check renal function with the addition of losartan HCTZ

## 2022-03-29 PROBLEM — E11.9 DIABETES MELLITUS WITHOUT COMPLICATION (HCC): Status: ACTIVE | Noted: 2022-03-29

## 2022-03-29 PROCEDURE — 93000 ELECTROCARDIOGRAM COMPLETE: CPT | Performed by: INTERNAL MEDICINE

## 2022-04-19 RX ORDER — FLUVOXAMINE MALEATE 100 MG
100 TABLET ORAL
COMMUNITY
Start: 2022-03-21

## 2022-04-22 ENCOUNTER — OFFICE VISIT (OUTPATIENT)
Dept: CARDIOLOGY CLINIC | Facility: CLINIC | Age: 62
End: 2022-04-22
Payer: MEDICARE

## 2022-04-22 VITALS
HEART RATE: 67 BPM | WEIGHT: 201.6 LBS | DIASTOLIC BLOOD PRESSURE: 70 MMHG | SYSTOLIC BLOOD PRESSURE: 128 MMHG | HEIGHT: 69 IN | BODY MASS INDEX: 29.86 KG/M2 | OXYGEN SATURATION: 96 % | TEMPERATURE: 98.1 F

## 2022-04-22 DIAGNOSIS — R07.9 CHEST PAIN, UNSPECIFIED TYPE: ICD-10-CM

## 2022-04-22 DIAGNOSIS — E78.2 MIXED HYPERLIPIDEMIA: ICD-10-CM

## 2022-04-22 DIAGNOSIS — N18.31 STAGE 3A CHRONIC KIDNEY DISEASE (HCC): ICD-10-CM

## 2022-04-22 DIAGNOSIS — R41.3 MEMORY LOSS: ICD-10-CM

## 2022-04-22 DIAGNOSIS — R60.0 LOCALIZED EDEMA: ICD-10-CM

## 2022-04-22 DIAGNOSIS — G47.33 OBSTRUCTIVE SLEEP APNEA: ICD-10-CM

## 2022-04-22 DIAGNOSIS — M79.605 PAIN OF LEFT LOWER EXTREMITY: ICD-10-CM

## 2022-04-22 DIAGNOSIS — I10 PRIMARY HYPERTENSION: Primary | ICD-10-CM

## 2022-04-22 DIAGNOSIS — E03.9 HYPOTHYROIDISM, UNSPECIFIED TYPE: ICD-10-CM

## 2022-04-22 PROBLEM — N18.30 STAGE 3 CHRONIC KIDNEY DISEASE (HCC): Status: ACTIVE | Noted: 2022-04-22

## 2022-04-22 PROCEDURE — 99214 OFFICE O/P EST MOD 30 MIN: CPT | Performed by: NURSE PRACTITIONER

## 2022-04-22 RX ORDER — LEVOTHYROXINE SODIUM 0.12 MG/1
TABLET ORAL
COMMUNITY
Start: 2022-04-12 | End: 2022-04-22 | Stop reason: SDUPTHER

## 2022-04-22 RX ORDER — MAGNESIUM 200 MG
200 TABLET ORAL DAILY
COMMUNITY

## 2022-04-22 NOTE — ASSESSMENT & PLAN NOTE
Pt reported 2 separate types of chest pain:  He reported history of a mid sternal pressure which had not occurred in quite some time  He also reports intermittent sharp, jabbing pain at his left sternal border  [facility] nuclear stress test performed 10/4/2021 was normal without evidence of ischemia  HR was elevated initially and metoprolol was added, but he did not tolerate due to bradycardia and it was discontinued  HR's have been much improved off of metoprolol  Echocardiogram 11/16/2021 was unremarkable  Continue with BP, lipid and glucose control  Will monitor for recurrent symptoms  Reviewed urgent s/s to report

## 2022-04-22 NOTE — PATIENT INSTRUCTIONS
Blood pressure is excellent on my recheck  No change to medications today  Check lipids with blood work for primary care  Contact us with BP staying > 140/90, chest pain, shortness of breath, swelling, or any other concerns

## 2022-04-22 NOTE — ASSESSMENT & PLAN NOTE
Lab Results   Component Value Date    EGFR 59 11/16/2021    EGFR 58 10/27/2021    EGFR 72 09/28/2021    CREATININE 1 32 (H) 11/16/2021    CREATININE 1 34 (H) 10/27/2021    CREATININE 1 12 09/28/2021     Recent lab work with stable renal function  Monitoring with use of losartan-HCTZ  Avoiding nephrotoxic agents

## 2022-04-22 NOTE — ASSESSMENT & PLAN NOTE
Hoshimoto's thyroiditis with resulting hypothyroidism  Managed on levothyroxine 125 mcg daily by PCP

## 2022-04-22 NOTE — PROGRESS NOTES
Cardiology Follow Up    Irene Torre  1960  [id number]  [facility]  [address]  [address] [place] [address]  [phone]    Lilly Meng presents today for routine follow up for chest pain, hypertension and hyperlipidemia  1  Primary hypertension  Assessment & Plan:  Recent history of labile blood pressure  Blood pressure is well controlled today here in the office  BP well controlled at home  Confirmed accuracy of home BP cuff  Continue Losartan-HCTZ 100-25mg daily  Continue 2 g sodium diet and regular moderate activity as tolerated  Will continue to monitor  Recent lab work reviewed  2  Mixed hyperlipidemia  Assessment & Plan:  Lipid panel 5/14/2021:     HDL 28  LDL 71  Pravastatin 80mg daily  Triglycerides above goal    Will monitor and consider medication adjustment as needed  Reviewed dietary modification for triglyceride control  If triglycerides remain elevated we can consider the addition of fenofibrate  Given updated lab order for 5/2022  Orders:  -     Lipid Panel with Direct LDL reflex; Future; Expected date: 05/20/2022    3  Chest pain, unspecified type  Assessment & Plan:  Pt reported 2 separate types of chest pain:  He reported history of a mid sternal pressure which had not occurred in quite some time  He also reports intermittent sharp, jabbing pain at his left sternal border  [place] nuclear stress test performed 10/4/2021 was normal without evidence of ischemia  HR was elevated initially and metoprolol was added, but he did not tolerate due to bradycardia and it was discontinued  HR's have been much improved off of metoprolol  Echocardiogram 11/16/2021 was unremarkable  Continue with BP, lipid and glucose control  Will monitor for recurrent symptoms  Reviewed urgent s/s to report            4  Stage 3a chronic kidney disease ([facility] [address] )  Assessment & Plan:  Lab Results Component Value Date    EGFR 59 11/16/2021    EGFR 58 10/27/2021    EGFR 72 09/28/2021    CREATININE 1 32 (H) 11/16/2021    CREATININE 1 34 (H) 10/27/2021    CREATININE 1 12 09/28/2021     Recent lab work with stable renal function  Monitoring with use of losartan-HCTZ  Avoiding nephrotoxic agents  5  Localized edema  Assessment & Plan:  History of lower leg edema  This is currently improved with addition of HCTZ  Tolerating HCTZ 25 mg daily  Echocardiogram 11/16/2021 was unremarkable with normal LVEF and normal diastolic function without any significant valvular abnormalities  Recommend compression socks and leg elevation when sitting for extended periods of time  Report any new/worsening symptoms  6  Pain of left lower extremity  Assessment & Plan:  History of cramping pain in his left lower leg  Venous doppler completed 9/22/2021 was negative for DVT  Pain has resolved at present  Continues OTC magnesium 250 mg daily  7  Obstructive sleep apnea  Assessment & Plan:  Uses CPAP faithfully      8  Hypothyroidism, unspecified type  Assessment & Plan:  Hoshimoto's thyroiditis with resulting hypothyroidism  Managed on levothyroxine 125 mcg daily by PCP  9  Memory loss  Assessment & Plan: Wife assists with obtaining history  HPI   Bedford Lennox has a past medical history of hyperlipidemia, hypertension, seizure disorder, thyroid disease, BPH, BEREKET,cerebral aneurysm, IBS, congenital hydrocephalus with  shunt, neuropathy and anxiety  Patient was evaluated previously by [facility] 10/27/2017 for lightheadedness, dizziness and palpitations  He did undergo cardiac catheterization in the 90's at [address] [place] [address]  [facility] in [place]       He [facility] ER [facility]/28/2021 for evaluation of chest pain  He told his wife that he had left sided chest pain and he tried antacids but did not note any significant change in symptoms   The next day he woke up with chest pain in am and was seen in the ER and given 4 low dose aspirin and nitro and pain resolved  He also had noted some left arm numbness and shortness of breath  Blood pressure was high on presentation  He was noted to be bradycardiac in ER  Troponin x 2 was negative  Patient was discharged home       He met in consultation with Dr Xiomara Land on 9/22/2021  He reported a sharp, grabbing pain as well as left hand numbness and tingling recurring since his ER visit  Last chest pain episode was about 2-3 weeks ago  He was also experiencing left leg swelling and pain  Using CPAP consistently  Blood pressure had been labile recently  HR was slow at 51 bpm and his metoprolol succinate was discontinued  His losartan was replaced with losartan/HCTZ 100/25 mg daily  Lab work was ordered  LLE u/s was ordered due to leg pain  Nuclear stress testing was also ordered  He was seen at [address] [place] [address] ER 9/28/2021 for hypertension  Potassium was low at 3 1  He had not yet taken his BP meds that day  He was given BP medication and a dose of potassium  Nuclear stress test was completed 10/4/2021 which revealed no evidence of scar or ischemia with gated EF 56%  His lower extremity duplex was negative for DVT  He followed up with me on 10/22/21 at which time we confirmed accuracy of his home BP cuff  He was experiencing lightheadedness with standing but denied near syncope  He was not hydrating well  He did report a sharp "jab" in his sternal area when lying down at night before applying his CPAP  He followed up most recently with Dr Xiomara Land on 1/28/2022  Blood pressures had been ok at home with occasional lower readings  He noted lightheadedness when readings were low, mainly in the 120/80 range  Heart rates much better off of metoprolol  He denied any recent chest pain  Occasional heart racing  Still has intermittent lower extremity edema  He also noted 'nerve pain' right burning back pain with burning in his feet  Occasional left arm numbness   No significant shortness of breath or dyspnea on exertion  He continued to have left leg pain intermittently  He was scheduled for an MRI the next week  4/22/2022: Otis Ramirez presents today accompanied by his wife for routine follow up  She assists with obtaining a history due to his memory loss and expressive aphasia  He states that he is doing well  BP running in 453-904'M systolic at home  Heart rates have been well controlled at home  His wife admits that he was consuming more salt in his diet - Cup of Noodles and TV dinners, but she has reduced this recently  He denies experiencing lightheadedness, palpitations, or heart racing recently  No lower leg swelling, shortness of breath  He denies dyspnea on exertion, but his wife comments that he does get winded if he climbs a hill  No exertional chest discomfort  He does report occasional brief sharp chest pain in his upper chest near his collar bone which resolves spontaneously and not associated with any other symptoms  He did receive Botox injections in March by [facility]      Medical Problems             Problem List     Hypertension    Hyperlipidemia    Chest pain    Localized edema    Pain of left lower extremity    Obstructive sleep apnea    Overview Deleted 2/21/2022  9:57 PM by Abril Marshall DO            Benign prostatic hyperplasia with urinary obstruction    Bipolar depression ([facility] )    Cerebral aneurysm, nonruptured    Overview Signed 10/24/2021 10:47 AM by ETHAN Duggan     Formatting of this note might be different from the original   LEFT MCA bifurcation by CTA 5/19/09, s/p clip 6/09         Congenital hydrocephalus ([facility] )    Overview Signed 10/24/2021 10:47 AM by Alana Payne of this note might be different from the original   Diagnosed Age 16, shunt placed in left side, revised at age 26 and 22, [facility]         Epileptic seizure ([facility] )    Hypothyroidism    Overview Signed 10/24/2021 10:47 AM by ETHAN Duggan     Formatting of this note might be different from the original   Hashimoto's autoimmune thyroiditis         Memory loss    Right upper quadrant abdominal pain    Diabetes mellitus without complication ([facility] )      No results found for: HGBA1C                Past Medical History:   Diagnosis Date    Abnormal EKG     Aneurysm of anterior cerebral artery     Anxiety     BPH (benign prostatic hyperplasia)     Chronic migraine     Depression     Disease of thyroid gland     Hydrocephalus (HCC)     Hyperlipidemia     Hypertension     Hypertriglyceridemia     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     Neutropenia (HCC)     Primary thrombocytopenia (HCC)     Seizure ([facility] )     Sleep apnea      Social History     Socioeconomic History    Marital status: /Civil Union     Spouse name: Not on file    Number of children: Not on file    Years of education: Not on file    Highest education level: Not on file   Occupational History    Not on file   Tobacco Use    Smoking status: Former Smoker     Years:      Quit date:      Years since quittin 3    Smokeless tobacco: Never Used   Vaping Use    Vaping Use: Never used   Substance and Sexual Activity    Alcohol use: Yes     Comment: extremely rare    Drug use: Never    Sexual activity: Not on file     Comment: defer   Other Topics Concern    Not on file   Social History Narrative    Not on file     Social Determinants of Health     Financial Resource Strain: Not on file   Food Insecurity: Not on file   Transportation Needs: Not on file   Physical Activity: Not on file   Stress: Not on file   Social Connections: Not on file   Intimate Partner Violence: Not on file   Housing Stability: Not on file      Family History   Problem Relation Age of Onset    Anuerysm Mother     Colon cancer Father     Hypertension Father     Heart disease Sister     Heart attack Sister     Breast cancer Sister     Stomach cancer Brother     Testicular cancer Brother     Skin cancer Brother     Diabetes Son     Thyroid disease Daughter     Cleft palate Daughter     Cleft lip Daughter      Past Surgical History:   Procedure Laterality Date    BRAIN SURGERY      CARDIAC CATHETERIZATION      CHOLECYSTECTOMY      VENTRICULOPERITONEAL SHUNT         Current Outpatient Medications:     Cholecalciferol (Vitamin D3) 10 MCG (400 UNIT) CHEW, Chew 2 tablets daily, Disp: , Rfl:     cyanocobalamin (VITAMIN B-12) 1000 MCG tablet, Take 1,000 mcg by mouth daily OTC B complex, Disp: , Rfl:     divalproex sodium (DEPAKOTE) 500 mg EC tablet, Take 500 mg by mouth every 12 (twelve) hours , Disp: , Rfl:     fluvoxaMINE (LUVOX) 100 mg tablet, Take 100 mg by mouth daily at bedtime  , Disp: , Rfl:     Levothyroxine Sodium 125 MCG CAPS, Take 125 mcg by mouth daily , Disp: , Rfl:     losartan-hydrochlorothiazide (HYZAAR) 100-25 MG per tablet, Take 1 tablet by mouth daily, Disp: 30 tablet, Rfl: 11    Magnesium 200 MG TABS, Take 200 mg by mouth daily, Disp: , Rfl:     memantine (NAMENDA) 10 mg tablet, Take 10 mg by mouth 2 (two) times a day , Disp: , Rfl:     mirtazapine (REMERON SOL-TAB) 45 mg dispersible tablet, Take 45 mg by mouth daily at bedtime, Disp: , Rfl:     POTASSIUM PO, Take 500 mg by mouth in the morning, Disp: , Rfl:     pravastatin (PRAVACHOL) 80 mg tablet, Take 80 mg by mouth daily , Disp: , Rfl:     Probiotic Product (PRO-BIOTIC BLEND PO), Take 1 tablet by mouth daily , Disp: , Rfl:     tamsulosin (FLOMAX) 0 4 mg, Take 0 4 mg by mouth daily with dinner , Disp: , Rfl:     topiramate (TOPAMAX) 100 mg tablet, Take 100 mg by mouth 2 (two) times a day , Disp: , Rfl:   Allergies   Allergen Reactions    Ampicillin Other (See Comments)     "low blood count"    Bactrim [Sulfamethoxazole-Trimethoprim] Other (See Comments)     "low blood count"    Bupropion Hives    Ciprofloxacin Hives     ?fasiculations, nausea      Hydromorphone Hives and Itching    Nafcillin Other (See Comments)     WBC count down         Labs:     Chemistry        Component Value Date/Time    K 3 0 (L) 11/16/2021 1034     11/16/2021 1034    CO2 30 11/16/2021 1034    BUN 14 11/16/2021 1034    CREATININE 1 32 (H) 11/16/2021 1034        Component Value Date/Time    CALCIUM 8 7 11/16/2021 1034    ALKPHOS 109 07/28/2021 1035    AST 14 07/28/2021 1035    ALT 32 07/28/2021 1035        Cardiac Test Results:     Echocardiogram 11/16/2021:   EF 58%  Normal diastolic function  No valvular abnormalities       Lexiscan nuclear stress test 10/4/2021:  No evidence of scar or ischemia  EF 56%       Lipid panel 5/14/2021: C 136  T 238  H 28  L 71  Review of Systems   Constitutional: Negative  HENT: Negative  Cardiovascular: Positive for dyspnea on exertion (on inclines, stable)  Negative for chest pain, irregular heartbeat, leg swelling, near-syncope, orthopnea and palpitations  Respiratory: Negative for cough, shortness of breath and snoring  Endocrine: Negative  Skin: Negative  Musculoskeletal: Negative  Gastrointestinal: Negative  Genitourinary: Negative  Neurological: Negative  Psychiatric/Behavioral: Negative  Vitals:    04/22/22 1411   BP: 128/70   Pulse:    Temp:    SpO2:      Vitals:    04/22/22 1351   Weight: 91 4 kg (201 lb 9 6 oz)     Height: 5' 9" (175 3 cm)   Body mass index is 29 77 kg/m²  Physical Exam  Vitals and nursing note reviewed  Constitutional:       General: He is not in acute distress  Appearance: He is well-developed  He is not diaphoretic  HENT:      Head: Normocephalic and atraumatic  Neck:      Vascular: No carotid bruit or JVD  Cardiovascular:      Rate and Rhythm: Normal rate and regular rhythm  No extrasystoles are present  Pulses: Intact distal pulses  Heart sounds: Normal heart sounds, S1 normal and S2 normal  No murmur heard  No friction rub  No gallop  Comments: No lower leg edema today  Pulmonary:      Effort: Pulmonary effort is normal  No respiratory distress  Breath sounds: Normal breath sounds  Abdominal:      General: There is no distension  Palpations: Abdomen is soft  Tenderness: There is no abdominal tenderness  Skin:     General: Skin is warm and dry  Findings: No rash  Neurological:      Mental Status: He is alert and oriented to person, place, and time        Comments: Expressive aphasia   Psychiatric:         Behavior: Behavior normal

## 2022-04-22 NOTE — ASSESSMENT & PLAN NOTE
Recent history of labile blood pressure  Blood pressure is well controlled today here in the office  BP well controlled at home  Confirmed accuracy of home BP cuff  Continue Losartan-HCTZ 100-25mg daily  Continue 2 g sodium diet and regular moderate activity as tolerated  Will continue to monitor  Recent lab work reviewed

## 2022-04-22 NOTE — ASSESSMENT & PLAN NOTE
Lipid panel 5/14/2021:     HDL 28  LDL 71  Pravastatin 80mg daily  Triglycerides above goal    Will monitor and consider medication adjustment as needed  Reviewed dietary modification for triglyceride control  If triglycerides remain elevated we can consider the addition of fenofibrate  Given updated lab order for 5/2022

## 2022-06-16 ENCOUNTER — APPOINTMENT (OUTPATIENT)
Dept: LAB | Facility: HOSPITAL | Age: 62
End: 2022-06-16
Payer: MEDICARE

## 2022-06-16 ENCOUNTER — HOSPITAL ENCOUNTER (OUTPATIENT)
Dept: ULTRASOUND IMAGING | Facility: HOSPITAL | Age: 62
Discharge: HOME | End: 2022-06-16
Payer: MEDICARE

## 2022-06-16 DIAGNOSIS — R52 PAIN, UNSPECIFIED: ICD-10-CM

## 2022-06-16 DIAGNOSIS — L90.5 SCAR CONDITIONS AND FIBROSIS OF SKIN: ICD-10-CM

## 2022-06-16 DIAGNOSIS — E78.2 MIXED HYPERLIPIDEMIA: ICD-10-CM

## 2022-06-16 DIAGNOSIS — D48.1 NEOPLASM OF UNCERTAIN BEHAVIOR OF CONNECTIVE AND OTHER SOFT TISSUE: ICD-10-CM

## 2022-06-16 LAB
CHOLEST SERPL-MCNC: 140 MG/DL
HDLC SERPL-MCNC: 34 MG/DL
LDLC SERPL CALC-MCNC: 62 MG/DL (ref 0–100)
TRIGL SERPL-MCNC: 219 MG/DL

## 2022-06-16 PROCEDURE — 36415 COLL VENOUS BLD VENIPUNCTURE: CPT

## 2022-06-16 PROCEDURE — 76705 ECHO EXAM OF ABDOMEN: CPT

## 2022-06-16 PROCEDURE — 80061 LIPID PANEL: CPT

## 2022-06-17 ENCOUNTER — TELEPHONE (OUTPATIENT)
Dept: CARDIOLOGY CLINIC | Facility: CLINIC | Age: 62
End: 2022-06-17

## 2022-06-17 DIAGNOSIS — E78.2 MIXED HYPERLIPIDEMIA: Primary | ICD-10-CM

## 2022-06-17 RX ORDER — FENOFIBRATE 48 MG/1
48 TABLET, COATED ORAL DAILY
Qty: 30 TABLET | Refills: 11 | Status: SHIPPED | OUTPATIENT
Start: 2022-06-17

## 2022-06-17 NOTE — TELEPHONE ENCOUNTER
Ordered fenofibrate 48 mg to ANAYA  Lab work in 2 months after starting  Notify us if any issues with starting new medication  Thank you!

## 2022-06-17 NOTE — TELEPHONE ENCOUNTER
----- Message from Neil Daigle 83 sent at 6/16/2022  5:34 PM EDT -----  Patient's triglycerides remain elevated  I would like to add fenofibrate to treat the triglycerides specifically  If agreeable I will order a low dose and plan for updated blood work 8 weeks after starting  Thank you!

## 2022-09-02 ENCOUNTER — TELEPHONE (OUTPATIENT)
Dept: CARDIOLOGY CLINIC | Facility: CLINIC | Age: 62
End: 2022-09-02

## 2022-09-02 ENCOUNTER — HOSPITAL ENCOUNTER (OUTPATIENT)
Dept: CT IMAGING | Facility: HOSPITAL | Age: 62
End: 2022-09-02
Payer: MEDICARE

## 2022-09-02 ENCOUNTER — APPOINTMENT (OUTPATIENT)
Dept: LAB | Facility: HOSPITAL | Age: 62
End: 2022-09-02
Payer: MEDICARE

## 2022-09-02 DIAGNOSIS — H02.401 UNSPECIFIED PTOSIS OF RIGHT EYELID: ICD-10-CM

## 2022-09-02 DIAGNOSIS — R29.898 LEFT LEG WEAKNESS: ICD-10-CM

## 2022-09-02 DIAGNOSIS — H02.401 PTOSIS OF RIGHT EYELID: ICD-10-CM

## 2022-09-02 DIAGNOSIS — E78.2 MIXED HYPERLIPIDEMIA: ICD-10-CM

## 2022-09-02 DIAGNOSIS — H51.9 UNSPECIFIED DISORDER OF BINOCULAR MOVEMENT: ICD-10-CM

## 2022-09-02 DIAGNOSIS — R29.898 OTHER SYMPTOMS AND SIGNS INVOLVING THE MUSCULOSKELETAL SYSTEM: ICD-10-CM

## 2022-09-02 DIAGNOSIS — H51.9 EYE MOVEMENT ABNORMALITY: ICD-10-CM

## 2022-09-02 LAB
CHOLEST SERPL-MCNC: 157 MG/DL
HDLC SERPL-MCNC: 41 MG/DL
LDLC SERPL CALC-MCNC: 88 MG/DL (ref 0–100)
TRIGL SERPL-MCNC: 141 MG/DL

## 2022-09-02 PROCEDURE — 80061 LIPID PANEL: CPT

## 2022-09-02 PROCEDURE — 86618 LYME DISEASE ANTIBODY: CPT

## 2022-09-02 PROCEDURE — 36415 COLL VENOUS BLD VENIPUNCTURE: CPT

## 2022-09-02 PROCEDURE — 70450 CT HEAD/BRAIN W/O DYE: CPT

## 2022-09-02 NOTE — TELEPHONE ENCOUNTER
----- Message from Neil Sidhu sent at 9/2/2022 12:12 PM EDT -----  Please let patient know that his triglycerides are improved significantly from 219 to 141 with addition of fenofibrate  LDL did come up slightly from 62 to 88  Will monitor on future blood work  Dr Sang Negrete can review in detail at his upcoming appointment next week  Thank you!

## 2022-09-03 LAB — B BURGDOR IGG+IGM SER-ACNC: <0.2 AI

## 2022-09-09 ENCOUNTER — OFFICE VISIT (OUTPATIENT)
Dept: CARDIOLOGY CLINIC | Facility: CLINIC | Age: 62
End: 2022-09-09
Payer: MEDICARE

## 2022-09-09 VITALS
SYSTOLIC BLOOD PRESSURE: 108 MMHG | HEIGHT: 69 IN | OXYGEN SATURATION: 97 % | HEART RATE: 73 BPM | DIASTOLIC BLOOD PRESSURE: 62 MMHG | WEIGHT: 194.4 LBS | BODY MASS INDEX: 28.79 KG/M2

## 2022-09-09 DIAGNOSIS — G47.33 OBSTRUCTIVE SLEEP APNEA: ICD-10-CM

## 2022-09-09 DIAGNOSIS — R60.0 LOCALIZED EDEMA: ICD-10-CM

## 2022-09-09 DIAGNOSIS — R07.9 CHEST PAIN, UNSPECIFIED TYPE: ICD-10-CM

## 2022-09-09 DIAGNOSIS — I10 PRIMARY HYPERTENSION: Primary | ICD-10-CM

## 2022-09-09 DIAGNOSIS — R10.11 RIGHT UPPER QUADRANT ABDOMINAL PAIN: ICD-10-CM

## 2022-09-09 DIAGNOSIS — M79.605 PAIN OF LEFT LOWER EXTREMITY: ICD-10-CM

## 2022-09-09 DIAGNOSIS — E78.2 MIXED HYPERLIPIDEMIA: ICD-10-CM

## 2022-09-09 PROCEDURE — 99214 OFFICE O/P EST MOD 30 MIN: CPT | Performed by: INTERNAL MEDICINE

## 2022-09-09 RX ORDER — CITALOPRAM 40 MG/1
40 TABLET ORAL DAILY
COMMUNITY

## 2022-09-09 NOTE — PROGRESS NOTES
Cardiology Office Visit    Sarah Wolfe  [id number]  1960    [facility]  [address] [facility] [address]  [address]      Dear Ari Butler MD,    I had the pleasure of seeing your patient at our [address] [facility] office today 9/9/2022  As you know he is a pleasant 62y o  year old male with a medical history as described below  Reason for office visit: Follow up chest pain, hypertension and hyperlipidemia  1  Primary hypertension  Assessment & Plan:  Patient with history of labile blood pressure  Blood pressure is well controlled today here in the office  BP well controlled at home  CRNP had confirmed accuracy of home BP cuff  Continue Losartan-HCTZ 100-25mg daily  Continue 2 g sodium diet and regular moderate activity as tolerated  Will continue to monitor  2  Mixed hyperlipidemia  Assessment & Plan:  Lipid panel 9/2/2022: C 157  T 141  H 41  L 88  Pravastatin 80mg daily  Triglycerides better controlled on recent lipid panel  Will monitor and consider medication adjustment as needed  Reviewed dietary modification for triglyceride control  If triglycerides would again be elevated we can consider the addition of fenofibrate         3  Chest pain, unspecified type  Assessment & Plan:  Pt reported 2 separate types of chest pain:  He reported history of a mid sternal pressure which had not occurred in quite some time  He also reports intermittent sharp, jabbing pain at his left sternal border  Yoselin Loose nuclear stress test performed 10/4/2021 was normal without evidence of ischemia  HR was elevated initially and metoprolol was added, but he did not tolerate due to bradycardia and it was discontinued  HR's have been much improved off of metoprolol  Echocardiogram 11/16/2021 was unremarkable  Continue with BP, lipid and glucose control  Will monitor for recurrent symptoms  4  Localized edema  Assessment & Plan:  History of lower leg edema  This is currently improved with addition of HCTZ  Tolerating HCTZ 25 mg daily  Echocardiogram 11/16/2021 was unremarkable with normal LVEF and normal diastolic function without any significant valvular abnormalities  Recommend compression socks and leg elevation when sitting for extended periods of time  Report any new/worsening symptoms  5  Pain of left lower extremity  Assessment & Plan:  History of cramping pain in his left lower leg  Venous doppler completed 9/22/2021 was negative for DVT  Pain has resolved at present  Continue OTC magnesium 250 mg daily  6  Obstructive sleep apnea  Assessment & Plan:  He reports compliance with CPAP  7  Right upper quadrant abdominal pain           HPI     Patient has a past medical history of hyperlipidemia, hypertension, seizure disorder, thyroid disease, BPH, BEREKET,cerebral aneurysm, IBS, congenital hydrocephalus with  shunt, neuropathy and anxiety  Patient presented to the ER at [facility] 7/28/2021 for evaluation of chest pain  He told his wife that he had left sided chest pain and he tried antacids but did not note any significant change in symptoms  The next day he woke up with chest pain in am and was seen in the ER and given 4 low dose aspirin and nitro and pain resolved  He also had noted some left arm numbness and shortness of breath  Blood pressure was high on presentation  He was noted to be bradycardiac in ER  Troponin x 2 was negative  Patient was discharged home  Patient was evaluated previously by [facility] 10/27/2017 for lightheadedness, dizziness and palpitations    9/22/2021: Patient presents to the office for evaluation  His memory is impaired and he is scheduled to see neurology next week at [facility]  Patients does notice decreased memory which can also be discussed with neurology    He describes sharp grabbing pain as well as left hand numbness and tingling  He has had recurrent episodes since seen in the ER  He has noted left leg swelling and pain  He noted right sided low back pain  He did undergo cardiac catheterization in the 90's at [address] [place] [address] in [place]  He does use his CPAP consistently  Last chest pain episode was about 2-3 weeks ago  Most recent lipid panel reviewed  Blood pressure has been labile recently  1/28/2022: Patient returns to the office today for follow up  He was last seen 10/22/2021 by [facility] at which time results of his initial testing were reviewed (results as outlined below)  Patient reports that his blood pressures have been ok at home with occasional lower readings  He does note some lightheadedness when readings are low  Blood pressures are mainly in the 120/80 range  Heart rates are much better off of metoprolol  He denies any recent chest pain  Occasional heart racing  Still has intermittent lower extremity edema  He also notes 'nerve pain' right burning back pain with burning in his feet  Occasional left arm numbness  No significant shortness of breath or dyspnea on exertion  He continues to have left leg apin intermittently  He is doing speech therapy  Scheduled for MRI next Friday  * Patient was seen by [facility] 4/22/2022 at which time he was doing well overall  9/9/2022: Patient returns to the office today for follow up  He denies any heart racing/palpitations  No chest pain  No significant shortness of breath  He continues to have back pain  He does get occasional burning in his feet and back  No recent left arm numbness  He is no longer doing speech therapy  His Depakote dose was recently adjusted  He is not taking magnesium  He is struggling with diarrhea alternating with constipation  No significant lower extremity edema  Lipid panel 9/2/2022 reviewed           Patient Active Problem List   Diagnosis    Hypertension    Hyperlipidemia    Localized edema    Pain of left lower extremity  Chest pain    Benign prostatic hyperplasia with urinary obstruction    Bipolar depression ([facility]    Cerebral aneurysm, nonruptured    Congenital hydrocephalus ([place] [address] )    Epileptic seizure ([place] [address] )    Hypothyroidism    Memory loss    Obstructive sleep apnea    Diabetes mellitus without complication ([place] [address] )    Stage 3 chronic kidney disease (HCC)     Past Medical History:   Diagnosis Date    Abnormal EKG     Aneurysm of anterior cerebral artery     Anxiety     BPH (benign prostatic hyperplasia)     Chronic migraine     Depression     Disease of thyroid gland     Hydrocephalus (HCC)     Hyperlipidemia     Hypertension     Hypertriglyceridemia     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     Neutropenia (HCC)     Primary thrombocytopenia (HCC)     Seizure ([place] [address] )     Sleep apnea      Social History     Socioeconomic History    Marital status: /Civil Union     Spouse name: Not on file    Number of children: Not on file    Years of education: Not on file    Highest education level: Not on file   Occupational History    Not on file   Tobacco Use    Smoking status: Former Smoker     Years:      Quit date:      Years since quittin 7    Smokeless tobacco: Never Used   Vaping Use    Vaping Use: Never used   Substance and Sexual Activity    Alcohol use: Yes     Comment: extremely rare    Drug use: Never    Sexual activity: Not on file     Comment: defer   Other Topics Concern    Not on file   Social History Narrative    Not on file     Social Determinants of Health     Financial Resource Strain: Not on file   Food Insecurity: Not on file   Transportation Needs: Not on file   Physical Activity: Not on file   Stress: Not on file   Social Connections: Not on file   Intimate Partner Violence: Not on file   Housing Stability: Not on file      Family History   Problem Relation Age of Onset    Anuerysm Mother     Colon cancer Father     Hypertension Father     Heart disease Sister     Heart attack Sister     Breast cancer Sister     Stomach cancer Brother     Testicular cancer Brother     Skin cancer Brother     Diabetes Son     Thyroid disease Daughter     Cleft palate Daughter     Cleft lip Daughter      Past Surgical History:   Procedure Laterality Date    BRAIN SURGERY      CARDIAC CATHETERIZATION      CHOLECYSTECTOMY      VENTRICULOPERITONEAL SHUNT         Current Outpatient Medications:     Cholecalciferol (Vitamin D3) 10 MCG (400 UNIT) CHEW, Chew 2 tablets daily, Disp: , Rfl:     citalopram (CeleXA) 40 mg tablet, Take 40 mg by mouth daily, Disp: , Rfl:     divalproex sodium (DEPAKOTE) 500 mg EC tablet, Take 500 mg by mouth One in the AM and two in the PM, Disp: , Rfl:     fenofibrate (TRICOR) 48 mg tablet, Take 1 tablet (48 mg total) by mouth daily, Disp: 30 tablet, Rfl: 11    fluvoxaMINE (LUVOX) 100 mg tablet, Take 100 mg by mouth daily at bedtime  , Disp: , Rfl:     Levothyroxine Sodium 125 MCG CAPS, Take 125 mcg by mouth daily , Disp: , Rfl:     memantine (NAMENDA) 10 mg tablet, Take 10 mg by mouth 2 (two) times a day , Disp: , Rfl:     mirtazapine (REMERON SOL-TAB) 45 mg dispersible tablet, Take 45 mg by mouth daily at bedtime, Disp: , Rfl:     POTASSIUM PO, Take 500 mg by mouth in the morning, Disp: , Rfl:     pravastatin (PRAVACHOL) 80 mg tablet, Take 80 mg by mouth daily , Disp: , Rfl:     Probiotic Product (PRO-BIOTIC BLEND PO), Take 1 tablet by mouth daily , Disp: , Rfl:     tamsulosin (FLOMAX) 0 4 mg, Take 0 4 mg by mouth daily with dinner , Disp: , Rfl:     topiramate (TOPAMAX) 100 mg tablet, Take 100 mg by mouth 2 (two) times a day , Disp: , Rfl:     cyanocobalamin (VITAMIN B-12) 1000 MCG tablet, Take 1,000 mcg by mouth daily OTC B complex (Patient not taking: Reported on 9/9/2022), Disp: , Rfl:     losartan-hydrochlorothiazide (HYZAAR) 100-25 MG per tablet, TAKE ONE TABLET BY MOUTH EVERY DAY, Disp: 30 tablet, Rfl: 11    Magnesium 200 MG TABS, Take 200 mg by mouth daily (Patient not taking: Reported on 9/9/2022), Disp: , Rfl:        Allergies   Allergen Reactions    Ampicillin Other (See Comments)     "low blood count"    Bactrim [Sulfamethoxazole-Trimethoprim] Other (See Comments)     "low blood count"    Bupropion Hives    Ciprofloxacin Hives     ?fasiculations, nausea      Hydromorphone Hives and Itching    Nafcillin Other (See Comments)     WBC count down         Cardiac Test Results:    Lipid panel 9/2/2022: C 157  T 141  H 41  L 88  Echocardiogram 11/16/2021:   EF 58%  Normal diastolic function  No valvular abnormalities  Lexiscan nuclear stress test 10/4/2021:  No evidence of scar or ischemia  EF 56%  Lipid panel 5/14/2021: C 136  T 238  H 28  L 71  Review of Systems:    Review of Systems   Constitutional: Negative for activity change, appetite change and fatigue  HENT: Negative for congestion, hearing loss, tinnitus and trouble swallowing  Eyes: Negative for visual disturbance  Respiratory: Negative for cough, chest tightness, shortness of breath and wheezing  Cardiovascular: Positive for leg swelling  Negative for chest pain and palpitations  Occasional heart racing   Gastrointestinal: Negative for abdominal distention, abdominal pain, nausea and vomiting  Genitourinary: Negative for difficulty urinating  Musculoskeletal: Positive for arthralgias  Burning in feet   Skin: Negative for rash  Neurological: Positive for light-headedness and numbness (occasional left arm numbness)  Negative for dizziness and syncope  Hematological: Does not bruise/bleed easily  Psychiatric/Behavioral: Negative for confusion  The patient is not nervous/anxious  All other systems reviewed and are negative          Vitals:    09/09/22 1105 09/09/22 1143   BP: 130/62 108/62   Pulse: 73    SpO2: 97%    Weight: 88 2 kg (194 lb 6 4 oz)    Height: 5' 9" (1 753 m) Vitals:    09/09/22 1105   Weight: 88 2 kg (194 lb 6 4 oz)     Height: 5' 9" (175 3 cm)     Physical Exam  Vitals reviewed  Constitutional:       Appearance: He is well-developed  HENT:      Head: Normocephalic and atraumatic  Eyes:      Conjunctiva/sclera: Conjunctivae normal       Pupils: Pupils are equal, round, and reactive to light  Neck:      Vascular: No JVD  Cardiovascular:      Rate and Rhythm: Normal rate and regular rhythm  Heart sounds: Normal heart sounds  No murmur heard  No friction rub  No gallop  Pulmonary:      Effort: Pulmonary effort is normal       Breath sounds: Normal breath sounds  Abdominal:      General: Bowel sounds are normal       Palpations: Abdomen is soft  Musculoskeletal:      Cervical back: Normal range of motion  Skin:     General: Skin is warm and dry  Neurological:      Mental Status: He is alert and oriented to person, place, and time  Psychiatric:         Speech: Speech is delayed  Behavior: Behavior normal          Cognition and Memory: Cognition is not impaired  Memory is impaired

## 2022-09-09 NOTE — PATIENT INSTRUCTIONS
Continue current medications without change  Labs reviewed from 9/2/2022  Call with any change in symptoms

## 2022-10-02 PROBLEM — R10.11 RIGHT UPPER QUADRANT ABDOMINAL PAIN: Status: RESOLVED | Noted: 2022-01-28 | Resolved: 2022-10-02

## 2022-10-02 NOTE — ASSESSMENT & PLAN NOTE
Lipid panel 9/2/2022: C 157  T 141  H 41  L 88  Pravastatin 80mg daily  Triglycerides better controlled on recent lipid panel  Will monitor and consider medication adjustment as needed  Reviewed dietary modification for triglyceride control  If triglycerides would again be elevated we can consider the addition of fenofibrate  Deric Mathis

## 2022-10-02 NOTE — ASSESSMENT & PLAN NOTE
Patient with history of labile blood pressure  Blood pressure is well controlled today here in the office  BP well controlled at home  CRNP had confirmed accuracy of home BP cuff  Continue Losartan-HCTZ 100-25mg daily  Continue 2 g sodium diet and regular moderate activity as tolerated  Will continue to monitor

## 2022-10-02 NOTE — ASSESSMENT & PLAN NOTE
History of cramping pain in his left lower leg  Venous doppler completed 9/22/2021 was negative for DVT  Pain has resolved at present  Continue OTC magnesium 250 mg daily

## 2022-10-02 NOTE — ASSESSMENT & PLAN NOTE
Pt reported 2 separate types of chest pain:  He reported history of a mid sternal pressure which had not occurred in quite some time  He also reports intermittent sharp, jabbing pain at his left sternal border  Lore Melissa nuclear stress test performed 10/4/2021 was normal without evidence of ischemia  HR was elevated initially and metoprolol was added, but he did not tolerate due to bradycardia and it was discontinued  HR's have been much improved off of metoprolol  Echocardiogram 11/16/2021 was unremarkable  Continue with BP, lipid and glucose control  Will monitor for recurrent symptoms

## 2023-04-03 ENCOUNTER — APPOINTMENT (EMERGENCY)
Dept: RADIOLOGY | Facility: HOSPITAL | Age: 63
End: 2023-04-03

## 2023-04-03 ENCOUNTER — HOSPITAL ENCOUNTER (EMERGENCY)
Facility: HOSPITAL | Age: 63
Discharge: HOME | End: 2023-04-03
Attending: EMERGENCY MEDICINE

## 2023-04-03 VITALS
HEART RATE: 51 BPM | WEIGHT: 198.63 LBS | BODY MASS INDEX: 29.42 KG/M2 | OXYGEN SATURATION: 97 % | TEMPERATURE: 96.9 F | HEIGHT: 69 IN | DIASTOLIC BLOOD PRESSURE: 65 MMHG | RESPIRATION RATE: 17 BRPM | SYSTOLIC BLOOD PRESSURE: 127 MMHG

## 2023-04-03 DIAGNOSIS — R07.9 CHEST PAIN: Primary | ICD-10-CM

## 2023-04-03 LAB
2HR DELTA HS TROPONIN: 0 NG/L
ALBUMIN SERPL BCP-MCNC: 4 G/DL (ref 3.5–5)
ALP SERPL-CCNC: 43 U/L (ref 34–104)
ALT SERPL W P-5'-P-CCNC: 10 U/L (ref 7–52)
ANION GAP SERPL CALCULATED.3IONS-SCNC: 5 MMOL/L (ref 4–13)
APTT PPP: 26 SECONDS (ref 23–37)
AST SERPL W P-5'-P-CCNC: 10 U/L (ref 13–39)
BASOPHILS # BLD AUTO: 0.07 THOUSANDS/ÂΜL (ref 0–0.1)
BASOPHILS NFR BLD AUTO: 2 % (ref 0–1)
BILIRUB SERPL-MCNC: 0.36 MG/DL (ref 0.2–1)
BNP SERPL-MCNC: 25 PG/ML (ref 0–100)
BUN SERPL-MCNC: 12 MG/DL (ref 5–25)
CALCIUM SERPL-MCNC: 8 MG/DL (ref 8.4–10.2)
CARDIAC TROPONIN I PNL SERPL HS: 5 NG/L
CARDIAC TROPONIN I PNL SERPL HS: 5 NG/L
CHLORIDE SERPL-SCNC: 108 MMOL/L (ref 96–108)
CO2 SERPL-SCNC: 25 MMOL/L (ref 21–32)
CREAT SERPL-MCNC: 1.32 MG/DL (ref 0.6–1.3)
EOSINOPHIL # BLD AUTO: 0.11 THOUSAND/ÂΜL (ref 0–0.61)
EOSINOPHIL NFR BLD AUTO: 3 % (ref 0–6)
ERYTHROCYTE [DISTWIDTH] IN BLOOD BY AUTOMATED COUNT: 12.4 % (ref 11.6–15.1)
GFR SERPL CREATININE-BSD FRML MDRD: 57 ML/MIN/1.73SQ M
GLUCOSE SERPL-MCNC: 99 MG/DL (ref 65–140)
HCT VFR BLD AUTO: 34.4 % (ref 36.5–49.3)
HGB BLD-MCNC: 11.2 G/DL (ref 12–17)
IMM GRANULOCYTES # BLD AUTO: 0.04 THOUSAND/UL (ref 0–0.2)
IMM GRANULOCYTES NFR BLD AUTO: 1 % (ref 0–2)
INR PPP: 1.02 (ref 0.84–1.19)
LYMPHOCYTES # BLD AUTO: 1.55 THOUSANDS/ÂΜL (ref 0.6–4.47)
LYMPHOCYTES NFR BLD AUTO: 35 % (ref 14–44)
MCH RBC QN AUTO: 31.3 PG (ref 26.8–34.3)
MCHC RBC AUTO-ENTMCNC: 32.6 G/DL (ref 31.4–37.4)
MCV RBC AUTO: 96 FL (ref 82–98)
MONOCYTES # BLD AUTO: 0.47 THOUSAND/ÂΜL (ref 0.17–1.22)
MONOCYTES NFR BLD AUTO: 11 % (ref 4–12)
NEUTROPHILS # BLD AUTO: 2.22 THOUSANDS/ÂΜL (ref 1.85–7.62)
NEUTS SEG NFR BLD AUTO: 48 % (ref 43–75)
NRBC BLD AUTO-RTO: 0 /100 WBCS
PLATELET # BLD AUTO: 169 THOUSANDS/UL (ref 149–390)
PMV BLD AUTO: 9.7 FL (ref 8.9–12.7)
POTASSIUM SERPL-SCNC: 3.3 MMOL/L (ref 3.5–5.3)
PROT SERPL-MCNC: 6.4 G/DL (ref 6.4–8.4)
PROTHROMBIN TIME: 13.5 SECONDS (ref 11.6–14.5)
RBC # BLD AUTO: 3.58 MILLION/UL (ref 3.88–5.62)
SODIUM SERPL-SCNC: 138 MMOL/L (ref 135–147)
WBC # BLD AUTO: 4.46 THOUSAND/UL (ref 4.31–10.16)

## 2023-04-03 RX ORDER — POTASSIUM CHLORIDE 20 MEQ/1
20 TABLET, EXTENDED RELEASE ORAL ONCE
Status: COMPLETED | OUTPATIENT
Start: 2023-04-03 | End: 2023-04-03

## 2023-04-03 RX ORDER — ASPIRIN 81 MG/1
324 TABLET, CHEWABLE ORAL ONCE
Status: COMPLETED | OUTPATIENT
Start: 2023-04-03 | End: 2023-04-03

## 2023-04-03 RX ADMIN — ASPIRIN 81 MG CHEWABLE TABLET 324 MG: 81 TABLET CHEWABLE at 19:57

## 2023-04-03 RX ADMIN — POTASSIUM CHLORIDE 20 MEQ: 20 TABLET, EXTENDED RELEASE ORAL at 20:52

## 2023-04-03 NOTE — ED PROVIDER NOTES
History  Chief Complaint   Patient presents with   • Chest Pain     Started 1hr PTA with sudden CP after eating  Pain lasted for 3-4 minutes with stabbing pain on left chest  Resolved on its own     Patient is a 41-year-old male with history of prior stroke presents to the emergency department due to complaint of chest pain patient reports after eating tonight developed about 3 to 4 minutes of substernal chest pain described as a sharp stabbing pain in the center of his chest which was significant and took his breath away  Symptoms entirely subsided sided spontaneously  This occurred about 1 hour prior to arrival   Patient currently denies any chest pain no acute symptoms at this time feels well  No nausea or vomiting or abdominal pain no choking or coughing  Chest Pain  Pain location:  Substernal area  Pain quality: sharp and stabbing    Pain radiates to:  Does not radiate  Pain severity:  Moderate  Onset quality:  Sudden  Duration:  4 minutes  Timing:  Intermittent  Progression:  Resolved  Chronicity:  New  Associated symptoms: shortness of breath    Associated symptoms: no abdominal pain, no cough, no dizziness, no fatigue, no fever, no headache, no nausea, no numbness, no palpitations, not vomiting and no weakness        Prior to Admission Medications   Prescriptions Last Dose Informant Patient Reported? Taking?    Cholecalciferol (Vitamin D3) 10 MCG (400 UNIT) CHEW   Yes No   Sig: Chew 2 tablets daily   Levothyroxine Sodium 125 MCG CAPS   Yes No   Sig: Take 125 mcg by mouth daily    Magnesium 200 MG TABS   Yes No   Sig: Take 200 mg by mouth daily   Patient not taking: Reported on 9/9/2022   POTASSIUM PO   Yes No   Sig: Take 500 mg by mouth in the morning   Probiotic Product (PRO-BIOTIC BLEND PO)   Yes No   Sig: Take 1 tablet by mouth daily    citalopram (CeleXA) 40 mg tablet   Yes No   Sig: Take 40 mg by mouth daily   cyanocobalamin (VITAMIN B-12) 1000 MCG tablet   Yes No   Sig: Take 1,000 mcg by mouth daily OTC B complex   Patient not taking: Reported on 9/9/2022   divalproex sodium (DEPAKOTE) 500 mg EC tablet   Yes No   Sig: Take 500 mg by mouth One in the AM and two in the PM   fenofibrate (TRICOR) 48 mg tablet   No No   Sig: Take 1 tablet (48 mg total) by mouth daily   fluvoxaMINE (LUVOX) 100 mg tablet   Yes No   Sig: Take 100 mg by mouth daily at bedtime     losartan-hydrochlorothiazide (HYZAAR) 100-25 MG per tablet   No No   Sig: TAKE ONE TABLET BY MOUTH EVERY DAY   memantine (NAMENDA) 10 mg tablet   Yes No   Sig: Take 10 mg by mouth 2 (two) times a day    mirtazapine (REMERON SOL-TAB) 45 mg dispersible tablet   Yes No   Sig: Take 45 mg by mouth daily at bedtime   pravastatin (PRAVACHOL) 80 mg tablet   Yes No   Sig: Take 80 mg by mouth daily    tamsulosin (FLOMAX) 0 4 mg   Yes No   Sig: Take 0 4 mg by mouth daily with dinner    topiramate (TOPAMAX) 100 mg tablet   Yes No   Sig: Take 100 mg by mouth 2 (two) times a day       Facility-Administered Medications: None       Past Medical History:   Diagnosis Date   • Abnormal EKG    • Aneurysm of anterior cerebral artery    • Anxiety    • BPH (benign prostatic hyperplasia)    • Chronic migraine    • Depression    • Disease of thyroid gland    • Hydrocephalus (HCC)    • Hyperlipidemia    • Hypertension    • Hypertriglyceridemia    • 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    • Neutropenia (HCC)    • Primary thrombocytopenia (HCC)    • Seizure ([facility] [address] )    • Sleep apnea        Past Surgical History:   Procedure Laterality Date   • BRAIN SURGERY     • CARDIAC CATHETERIZATION     • CHOLECYSTECTOMY     • VENTRICULOPERITONEAL SHUNT         Family History   Problem Relation Age of Onset   • Anuerysm Mother    • Colon cancer Father    • Hypertension Father    • Heart disease Sister    • Heart attack Sister    • Breast cancer Sister    • Stomach cancer Brother    • Testicular cancer Brother    • Skin cancer Brother    • Diabetes Son • Thyroid disease Daughter    • Cleft palate Daughter    • Cleft lip Daughter      I have reviewed and agree with the history as documented  E-Cigarette/Vaping   • E-Cigarette Use Never User      E-Cigarette/Vaping Substances     Social History     Tobacco Use   • Smoking status: Former     Years:      Types: Cigarettes     Quit date:      Years since quittin 2   • Smokeless tobacco: Never   Vaping Use   • Vaping Use: Never used   Substance Use Topics   • Alcohol use: Yes     Comment: extremely rare   • Drug use: Never       Review of Systems   Constitutional: Negative for activity change, appetite change, chills, fatigue and fever  HENT: Negative for congestion, ear pain, rhinorrhea and sore throat  Eyes: Negative for discharge, redness and visual disturbance  Respiratory: Positive for shortness of breath  Negative for cough, chest tightness and wheezing  Cardiovascular: Positive for chest pain  Negative for palpitations  Gastrointestinal: Negative for abdominal pain, constipation, diarrhea, nausea and vomiting  Endocrine: Negative for polydipsia and polyuria  Genitourinary: Negative for difficulty urinating, dysuria, frequency, hematuria and urgency  Musculoskeletal: Negative for arthralgias and myalgias  Skin: Negative for color change, pallor and rash  Neurological: Negative for dizziness, weakness, light-headedness, numbness and headaches  Hematological: Negative for adenopathy  Does not bruise/bleed easily  All other systems reviewed and are negative  Physical Exam  Physical Exam  Vitals and nursing note reviewed  Constitutional:       Appearance: He is well-developed  HENT:      Head: Normocephalic and atraumatic  Right Ear: External ear normal       Left Ear: External ear normal       Nose: Nose normal    Eyes:      Conjunctiva/sclera: Conjunctivae normal       Pupils: Pupils are equal, round, and reactive to light     Cardiovascular:      Rate and Rhythm: Normal rate and regular rhythm  Heart sounds: Normal heart sounds  Pulmonary:      Effort: Pulmonary effort is normal  No respiratory distress  Breath sounds: Normal breath sounds  No wheezing or rales  Chest:      Chest wall: No tenderness  Abdominal:      General: Bowel sounds are normal  There is no distension  Palpations: Abdomen is soft  Tenderness: There is no abdominal tenderness  There is no guarding  Musculoskeletal:         General: Normal range of motion  Cervical back: Normal range of motion and neck supple  Skin:     General: Skin is warm and dry  Neurological:      Mental Status: He is alert and oriented to person, place, and time  Cranial Nerves: No cranial nerve deficit  Sensory: No sensory deficit           Vital Signs  ED Triage Vitals [04/03/23 1947]   Temperature Pulse Respirations Blood Pressure SpO2   (!) 96 9 °F (36 1 °C) 61 18 135/63 100 %      Temp Source Heart Rate Source Patient Position - Orthostatic VS BP Location FiO2 (%)   Temporal Monitor Lying Left arm --      Pain Score       No Pain           Vitals:    04/03/23 2000 04/03/23 2045 04/03/23 2100 04/03/23 2115   BP: 142/68 121/60 109/56 133/63   Pulse: 57 (!) 54 (!) 51 (!) 53   Patient Position - Orthostatic VS:             Visual Acuity      ED Medications  Medications   aspirin chewable tablet 324 mg (324 mg Oral Given 4/3/23 1957)   potassium chloride (K-DUR,KLOR-CON) CR tablet 20 mEq (20 mEq Oral Given 4/3/23 2052)       Diagnostic Studies  Results Reviewed     Procedure Component Value Units Date/Time    B-Type Natriuretic Peptide(BNP) [[id number]]  (Normal) Collected: 04/03/23 1956    Lab Status: Final result Specimen: Blood from Arm, Left Updated: 04/03/23 2041     BNP 25 pg/mL     HS Troponin 0hr (reflex protocol) [[id number]]  (Normal) Collected: 04/03/23 1956    Lab Status: Final result Specimen: Blood from Arm, Left Updated: 04/03/23 2039     hs TnI 0hr 5 ng/L     HS Troponin I 2hr [[id number]]     Lab Status: No result Specimen: Blood     HS Troponin I 4hr [[id number]]     Lab Status: No result Specimen: Blood     Comprehensive metabolic panel [[id number]]  (Abnormal) Collected: 04/03/23 1956    Lab Status: Final result Specimen: Blood from Arm, Left Updated: 04/03/23 2024     Sodium 138 mmol/L      Potassium 3 3 mmol/L      Chloride 108 mmol/L      CO2 25 mmol/L      ANION GAP 5 mmol/L      BUN 12 mg/dL      Creatinine 1 32 mg/dL      Glucose 99 mg/dL      Calcium 8 0 mg/dL      AST 10 U/L      ALT 10 U/L      Alkaline Phosphatase 43 U/L      Total Protein 6 4 g/dL      Albumin 4 0 g/dL      Total Bilirubin 0 36 mg/dL      eGFR 57 ml/min/1 73sq m     Narrative:      [place] guidelines for Chronic Kidney Disease (CKD):   •  Stage 1 with normal or high GFR (GFR > 90 mL/min/1 73 square meters)  •  Stage 2 Mild CKD (GFR = 60-89 mL/min/1 73 square meters)  •  Stage 3A Moderate CKD (GFR = 45-59 mL/min/1 73 square meters)  •  Stage 3B Moderate CKD (GFR = 30-44 mL/min/1 73 square meters)  •  Stage 4 Severe CKD (GFR = 15-29 mL/min/1 73 square meters)  •  Stage 5 End Stage CKD (GFR <15 mL/min/1 73 square meters)  Note: GFR calculation is accurate only with a steady state creatinine    Protime-INR [[id number]]  (Normal) Collected: 04/03/23 1956    Lab Status: Final result Specimen: Blood from Arm, Left Updated: 04/03/23 2020     Protime 13 5 seconds      INR 1 02    APTT [[id number]]  (Normal) Collected: 04/03/23 1956    Lab Status: Final result Specimen: Blood from Arm, Left Updated: 04/03/23 2020     PTT 26 seconds     CBC and differential [[id number]]  (Abnormal) Collected: 04/03/23 1956    Lab Status: Final result Specimen: Blood from Arm, Left Updated: 04/03/23 2002     WBC 4 46 Thousand/uL      RBC 3 58 Million/uL      Hemoglobin 11 2 g/dL      Hematocrit 34 4 %      MCV 96 fL      MCH 31 3 pg      MCHC 32 6 g/dL      RDW 12 4 %      MPV 9 7 fL      Platelets 169 Thousands/uL      nRBC 0 /100 WBCs      Neutrophils Relative 48 %      Immat GRANS % 1 %      Lymphocytes Relative 35 %      Monocytes Relative 11 %      Eosinophils Relative 3 %      Basophils Relative 2 %      Neutrophils Absolute 2 22 Thousands/µL      Immature Grans Absolute 0 04 Thousand/uL      Lymphocytes Absolute 1 55 Thousands/µL      Monocytes Absolute 0 47 Thousand/µL      Eosinophils Absolute 0 11 Thousand/µL      Basophils Absolute 0 07 Thousands/µL                  XR chest 1 view portable   ED Interpretation by Tommy Peter DO (04/03 2049)    no acute cardiopulmonary disease                 Procedures  ECG 12 Lead Documentation Only    Date/Time: 4/3/2023 7:53 PM  Performed by: Tommy Peter DO  Authorized by: Tommy Peter DO     ECG reviewed by me, the ED Provider: yes    Patient location:  ED  Previous ECG:     Comparison to cardiac monitor: Yes    Quality:     Tracing quality:  Limited by artifact  Rate:     ECG rate:  57    ECG rate assessment: bradycardic    Rhythm:     Rhythm: sinus bradycardia    QRS:     QRS axis:  Left    QRS intervals:  Normal  Conduction:     Conduction: normal    ST segments:     ST segments:  Normal  T waves:     T waves: normal               ED Course             HEART Risk Score    Flowsheet Row Most Recent Value   Heart Score Risk Calculator    History 0 Filed at: 04/03/2023 1948   ECG 0 Filed at: 04/03/2023 1948   Age 1 Filed at: 04/03/2023 1948   Risk Factors 2 Filed at: 04/03/2023 1948   Troponin 0 Filed at: 04/03/2023 1948   HEART Score 3 Filed at: 04/03/2023 1948                                      Medical Decision Making  Remained clinically and hemodynamically stable in the emergency department he is afebrile nontoxic well-appearing remained chest pain-free throughout observation monitoring in the ED  work-up in the ED reveals no evidence of acute cardiopulmonary pathology    Heart score is 3 patient is low risk for major adverse cardiac event with negative high-sensitivity troponin x2 in the ED based on ACS algorithm recommendation is for DC with PCP follow-up patient feels well and understands this recommendation and agrees and will follow up promptly as advised  Return precautions and anticipatory guidance discussed  Chest pain: acute illness or injury  Amount and/or Complexity of Data Reviewed  External Data Reviewed: labs, radiology, ECG and notes  Labs: ordered  Decision-making details documented in ED Course  Radiology: ordered and independent interpretation performed  Decision-making details documented in ED Course  ECG/medicine tests: ordered and independent interpretation performed  Decision-making details documented in ED Course  Risk  OTC drugs  Prescription drug management  Disposition  Final diagnoses:   Chest pain     Time reflects when diagnosis was documented in both MDM as applicable and the Disposition within this note     Time User Action Codes Description Comment    4/3/2023  9:28 PM Jose Manueljanelle Loredo Add [R07 9] Chest pain       ED Disposition     None      Follow-up Information     Follow up With Specialties Details Why Contact Info    Delfina Ellis MD Internal Medicine Schedule an appointment as soon as possible for a visit in 3 days  [address] [facility]  [phone]            Patient's Medications   Discharge Prescriptions    No medications on file       No discharge procedures on file      PDMP Review     None          ED Provider  Electronically Signed by           Griselda Agar, DO  04/03/23 9557

## 2023-04-04 LAB
ATRIAL RATE: 57 BPM
P AXIS: 57 DEGREES
P AXIS: 63 DEGREES
PR INTERVAL: 126 MS
PR INTERVAL: 136 MS
QRS AXIS: -18 DEGREES
QRS AXIS: -33 DEGREES
QRSD INTERVAL: 88 MS
QRSD INTERVAL: 92 MS
QT INTERVAL: 458 MS
QT INTERVAL: 474 MS
QTC INTERVAL: 445 MS
QTC INTERVAL: 461 MS
T WAVE AXIS: 12 DEGREES
T WAVE AXIS: 28 DEGREES
VENTRICULAR RATE: 57 BPM

## 2023-04-04 NOTE — ED CARE HANDOFF
Emergency Department Sign Out Note        Sign out and transfer of care from Dr Vidya Disla  See Separate Emergency Department note  The patient, Frances Bear, was evaluated by the previous provider for CP  Workup Completed:  Cardiac workup    ED Course / Workup Pending (followup):  T2       HEART Risk Score    Flowsheet Row Most Recent Value   Heart Score Risk Calculator    History 0 Filed at: 04/03/2023 1948   ECG 0 Filed at: 04/03/2023 1948   Age 1 Filed at: 04/03/2023 1948   Risk Factors 2 Filed at: 04/03/2023 1948   Troponin 0 Filed at: 04/03/2023 1948   HEART Score 3 Filed at: 04/03/2023 1948                                     Procedures  MDM  2234: Signout received at change of shift  T2 negative  Stable for discharge  Disposition  Final diagnoses:   Chest pain     Time reflects when diagnosis was documented in both MDM as applicable and the Disposition within this note     Time User Action Codes Description Comment    4/3/2023  9:28 PM Jacquelin Martinez [R07 9] Chest pain       ED Disposition     ED Disposition   Discharge    Condition   Stable    Date/Time   Mon Apr 3, 2023 10:34 PM    Comment   Frances Bear discharge to home/self care  Follow-up Information     Follow up With Specialties Details Why Contact Info    Gustavo Feldman MD Internal Medicine Schedule an appointment as soon as possible for a visit in 3 days  [address] [facility]  [phone]          Patient's Medications   Discharge Prescriptions    No medications on file     No discharge procedures on file         ED Provider  Electronically Signed by     Kiah Harvey MD  04/03/23 3994

## 2023-04-19 RX ORDER — PHENOL 1.4 %
10 AEROSOL, SPRAY (ML) MUCOUS MEMBRANE DAILY
COMMUNITY

## 2023-04-19 RX ORDER — LACTULOSE 10 G/15ML
10 SOLUTION ORAL
COMMUNITY
Start: 2022-11-11

## 2023-04-20 ENCOUNTER — OFFICE VISIT (OUTPATIENT)
Dept: CARDIOLOGY CLINIC | Facility: CLINIC | Age: 63
End: 2023-04-20
Payer: MEDICARE

## 2023-04-20 VITALS
OXYGEN SATURATION: 96 % | BODY MASS INDEX: 28.41 KG/M2 | HEART RATE: 66 BPM | WEIGHT: 191.8 LBS | SYSTOLIC BLOOD PRESSURE: 130 MMHG | DIASTOLIC BLOOD PRESSURE: 80 MMHG | HEIGHT: 69 IN

## 2023-04-20 DIAGNOSIS — R60.0 LOCALIZED EDEMA: ICD-10-CM

## 2023-04-20 DIAGNOSIS — E53.9 VITAMIN B DEFICIENCY: ICD-10-CM

## 2023-04-20 DIAGNOSIS — E78.2 MIXED HYPERLIPIDEMIA: ICD-10-CM

## 2023-04-20 DIAGNOSIS — R07.9 CHEST PAIN, UNSPECIFIED TYPE: ICD-10-CM

## 2023-04-20 DIAGNOSIS — R63.4 WEIGHT LOSS: Primary | ICD-10-CM

## 2023-04-20 DIAGNOSIS — I10 PRIMARY HYPERTENSION: ICD-10-CM

## 2023-04-20 DIAGNOSIS — M79.605 PAIN OF LEFT LOWER EXTREMITY: ICD-10-CM

## 2023-04-20 DIAGNOSIS — G47.33 OBSTRUCTIVE SLEEP APNEA: ICD-10-CM

## 2023-04-20 PROCEDURE — 99214 OFFICE O/P EST MOD 30 MIN: CPT | Performed by: INTERNAL MEDICINE

## 2023-04-20 RX ORDER — DOCUSATE SODIUM 100 MG/1
100 CAPSULE, LIQUID FILLED ORAL 2 TIMES DAILY
COMMUNITY

## 2023-04-20 RX ORDER — LEVOTHYROXINE SODIUM 0.12 MG/1
TABLET ORAL
COMMUNITY
Start: 2023-04-19

## 2023-04-20 RX ORDER — LOSARTAN POTASSIUM 50 MG/1
50 TABLET ORAL DAILY
Qty: 30 TABLET | Refills: 11 | Status: SHIPPED | OUTPATIENT
Start: 2023-04-20

## 2023-04-20 RX ORDER — LOSARTAN POTASSIUM AND HYDROCHLOROTHIAZIDE 12.5; 5 MG/1; MG/1
TABLET ORAL
COMMUNITY
Start: 2023-03-09 | End: 2023-04-20 | Stop reason: ALTCHOICE

## 2023-04-20 RX ORDER — MEMANTINE HYDROCHLORIDE 28 MG/1
CAPSULE, EXTENDED RELEASE ORAL
COMMUNITY
Start: 2023-03-22

## 2023-04-20 NOTE — PATIENT INSTRUCTIONS
Stop losartan-HCTZ and start losartan 50 mg daily. Labs today to monitor potassium level. Check vitamin B level with blood work. We referred you to [facility] - you should get a call to schedule, please let us know if you do  not hear in the next 2 weeks.

## 2023-04-27 ENCOUNTER — APPOINTMENT (OUTPATIENT)
Dept: LAB | Facility: HOSPITAL | Age: 63
End: 2023-04-27

## 2023-04-27 ENCOUNTER — TELEPHONE (OUTPATIENT)
Dept: CARDIOLOGY CLINIC | Facility: CLINIC | Age: 63
End: 2023-04-27

## 2023-04-27 DIAGNOSIS — I10 PRIMARY HYPERTENSION: ICD-10-CM

## 2023-04-27 DIAGNOSIS — N18.31 STAGE 3A CHRONIC KIDNEY DISEASE (HCC): Primary | ICD-10-CM

## 2023-04-27 LAB
ANION GAP SERPL CALCULATED.3IONS-SCNC: 7 MMOL/L (ref 4–13)
BUN SERPL-MCNC: 16 MG/DL (ref 5–25)
CALCIUM SERPL-MCNC: 8.6 MG/DL (ref 8.4–10.2)
CHLORIDE SERPL-SCNC: 113 MMOL/L (ref 96–108)
CO2 SERPL-SCNC: 22 MMOL/L (ref 21–32)
CREAT SERPL-MCNC: 1.52 MG/DL (ref 0.6–1.3)
GFR SERPL CREATININE-BSD FRML MDRD: 48 ML/MIN/1.73SQ M
GLUCOSE P FAST SERPL-MCNC: 118 MG/DL (ref 65–99)
POTASSIUM SERPL-SCNC: 3.7 MMOL/L (ref 3.5–5.3)
SODIUM SERPL-SCNC: 142 MMOL/L (ref 135–147)
VIT B12 SERPL-MCNC: 280 PG/ML (ref 100–900)

## 2023-04-27 PROCEDURE — 82607 VITAMIN B-12: CPT | Performed by: INTERNAL MEDICINE

## 2023-04-27 NOTE — TELEPHONE ENCOUNTER
----- Message from Busterthorcarmine Daigle 83 sent at 4/27/2023  1:23 PM EDT -----  Please let Anjum's wife know that his potassium is back in normal range  His kidney function remains low, so I recommend staying off the HCTZ  We should recheck his kidney function within 1 month of being off the HCTZ  Order in chart  Thank you!

## 2023-04-28 ENCOUNTER — TELEPHONE (OUTPATIENT)
Dept: CARDIOLOGY CLINIC | Facility: CLINIC | Age: 63
End: 2023-04-28

## 2023-04-28 NOTE — TELEPHONE ENCOUNTER
----- Message from Neil Sidhu sent at 4/28/2023 11:09 AM EDT -----  Please let patient know that his B12 is on the low side at 280, ideally > 400  He can discuss a daily supplement with his PCP    Thank you

## 2023-05-19 ENCOUNTER — OFFICE VISIT (OUTPATIENT)
Dept: CARDIOLOGY CLINIC | Facility: CLINIC | Age: 63
End: 2023-05-19

## 2023-05-19 VITALS
HEART RATE: 76 BPM | BODY MASS INDEX: 28.23 KG/M2 | WEIGHT: 190.6 LBS | SYSTOLIC BLOOD PRESSURE: 140 MMHG | DIASTOLIC BLOOD PRESSURE: 88 MMHG | HEIGHT: 69 IN | OXYGEN SATURATION: 97 %

## 2023-05-19 DIAGNOSIS — R07.9 CHEST PAIN, UNSPECIFIED TYPE: ICD-10-CM

## 2023-05-19 DIAGNOSIS — E78.2 MIXED HYPERLIPIDEMIA: ICD-10-CM

## 2023-05-19 DIAGNOSIS — I10 PRIMARY HYPERTENSION: Primary | ICD-10-CM

## 2023-05-19 DIAGNOSIS — M79.605 PAIN OF LEFT LOWER EXTREMITY: ICD-10-CM

## 2023-05-19 DIAGNOSIS — R60.0 LOCALIZED EDEMA: ICD-10-CM

## 2023-05-19 DIAGNOSIS — G47.33 OBSTRUCTIVE SLEEP APNEA: ICD-10-CM

## 2023-05-19 NOTE — PROGRESS NOTES
Cardiology Follow Up    Abundio Chowdhury  1960  [id number]  [facility] [place] [id number] [facility] [address]  [address]  [phone]    Makayla Magaña presents today for routine follow up for chest pain, hypertension and hyperlipidemia  1  Primary hypertension  Assessment & Plan:  Patient with history of labile blood pressure  Blood pressures are currently well controlled at home and in office on losartan 50 mg daily  Accuracy of home BP cuff previously confirmed  HCTZ discontinued due to hypokalemia, elevated creatinine, and lightheadedness  BMP ordered to be drawn this month  Continue 2 g sodium diet and regular moderate activity as tolerated  Will continue to monitor  2  Mixed hyperlipidemia  Assessment & Plan:  Lipid panel 9/2/2022: C 157  T 141  H 41  L 88  Pravastatin 80mg daily  Triglycerides better controlled on recent lipid panel  Will monitor and consider medication adjustment as needed  Reviewed dietary modification for triglyceride control  If triglycerides would again be elevated we can consider the addition of fenofibrate  3  Obstructive sleep apnea  Assessment & Plan:  He reports compliance with CPAP  4  Chest pain, unspecified type  Assessment & Plan:  Pt reported 2 separate types of chest pain:  He reported history of a mid sternal pressure which had not occurred in quite some time  He also reports intermittent sharp, jabbing pain at his left sternal border  [place] [facility] nuclear stress test performed 10/4/2021 was normal without evidence of ischemia  HR was elevated initially and metoprolol was added, but he did not tolerate due to bradycardia and it was discontinued  HR's have been much improved off of metoprolol  Echocardiogram 11/16/2021 was unremarkable  Continue with BP, lipid and glucose control  Will monitor for recurrent symptoms  Currently awaiting GI evaluation as well        5  Localized edema  Assessment & Plan:  History of lower leg edema  Echocardiogram 11/16/2021 was unremarkable with normal LVEF and normal diastolic function without any significant valvular abnormalities  Previously on HCTZ which has been discontinued due to hypokalemia, elevated creatine  Recommend compression socks and leg elevation when sitting for extended periods of time  Report any new/worsening symptoms  6  Pain of left lower extremity  Assessment & Plan:  History of cramping pain in his left lower leg  Venous doppler completed 9/22/2021 was negative for DVT  Pain has resolved at present  Continue OTC magnesium 250 mg daily  Jean Daniel has a past medical history of hyperlipidemia, hypertension, seizure disorder, thyroid disease, BPH, BEREKET,cerebral aneurysm, IBS, congenital hydrocephalus with  shunt, neuropathy and anxiety      Patient was evaluated previously by [facility] 10/27/2017 for lightheadedness, dizziness and palpitations  He did undergo cardiac catheterization in the 90's at [facility] in [place]       He presented to [address] [place] [address] ER 7/28/2021 for evaluation of chest pain  He told his wife that he had left sided chest pain and he tried antacids but did not note any significant change in symptoms  The next day he woke up with chest pain in am and was seen in the ER and given 4 low dose aspirin and nitro and pain resolved  He also had noted some left arm numbness and shortness of breath  Blood pressure was high on presentation  He was noted to be bradycardiac in ER  Troponin x 2 was negative  Patient was discharged home       He met in consultation with Dr Castro Art on 9/22/2021  He reported a sharp, grabbing pain as well as left hand numbness and tingling recurring since his ER visit  Last chest pain episode was about 2-3 weeks ago  He was also experiencing left leg swelling and pain  Using CPAP consistently  Blood pressure had been labile recently   HR was slow at 51 bpm and his metoprolol succinate was discontinued  His losartan was replaced with losartan/HCTZ 100/25 mg daily  Lab work was ordered  LLE u/s was ordered due to leg pain  Nuclear stress testing was also ordered      He was seen at [facility] ER 9/28/2021 for hypertension  Potassium was low at 3 1  He had not yet taken his BP meds that day  He was given BP medication and a dose of potassium      Nuclear stress test was completed 10/4/2021 which revealed no evidence of scar or ischemia with gated EF 56%  His lower extremity duplex was negative for DVT     He followed up most recently with Dr Gama Laguerre on 4/20/2023  His PCP had lowered his losartan/HCTZ from 100/25 to 50/12 5 mg on 3/9/2023 due to lightheaded episodes and hypotension at the visit  He was evaluated at [facility] ER 4/3/2023 for a stabbing chest pain which occurred after eating and resolved on its own  Cardiac work up was unrevealing  BP acceptable  Lab work was notable for hypokalemia at 3 3 and elevated creatinine of 1 32  His HCTZ was discontinued and losartan 50 mg daily prescribed  He was referred to GI due to issues with constipation/diarrhea and abdominal discomfort  Close follow up was arranged  5/19/2023: Dorthea Mohs presents today accompanied by his spouse for close follow up  She assists with obtaining a history due to his memory issues  No cardiac complaints today  Blood pressure remains acceptable today and per home readings  Repeat BMP on 4/27 showed improved potassium of 3 7  Creatinine remained above baseline at 1 5  He was given another order to recheck this month, which he plans to do next week  He reports resolution of lightheadedness  No recurrent chest pain  No shortness of breath or swelling issues   He is continuing with GI issues and is scheduled for a colonoscopy in August      Medical Problems     Problem List     Hypertension    Hyperlipidemia    Chest pain    Localized edema    Pain of left lower extremity    Obstructive sleep apnea    Benign prostatic hyperplasia with urinary obstruction    Bipolar depression (HCC)    Cerebral aneurysm, nonruptured    Congenital hydrocephalus (HCC)    Epileptic seizure ([facility] )    Hypothyroidism    Memory loss    Diabetes mellitus without complication ([place] [address] )    Stage 3 chronic kidney disease ([place] [address] )    Vitamin B deficiency    Weight loss        Past Medical History:   Diagnosis Date   • Abnormal EKG    • Aneurysm of anterior cerebral artery    • Anxiety    • BPH (benign prostatic hyperplasia)    • Chronic migraine    • Depression    • Disease of thyroid gland    • Hydrocephalus (HCC)    • Hyperlipidemia    • Hypertension    • Hypertriglyceridemia    • 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    • Neutropenia (HCC)    • Primary thrombocytopenia (HCC)    • Seizure ([place] [address] )    • Sleep apnea      Social History     Socioeconomic History   • Marital status: /Civil Union     Spouse name: Not on file   • Number of children: Not on file   • Years of education: Not on file   • Highest education level: Not on file   Occupational History   • Not on file   Tobacco Use   • Smoking status: Former     Years:      Types: Cigarettes     Quit date:      Years since quittin 3   • Smokeless tobacco: Never   Vaping Use   • Vaping Use: Never used   Substance and Sexual Activity   • Alcohol use: Yes     Comment: extremely rare   • Drug use: Never   • Sexual activity: Not on file     Comment: defer   Other Topics Concern   • Not on file   Social History Narrative   • Not on file     Social Determinants of Health     Financial Resource Strain: Not on file   Food Insecurity: Not on file   Transportation Needs: Not on file   Physical Activity: Not on file   Stress: Not on file   Social Connections: Not on file   Intimate Partner Violence: Not on file   Housing Stability: Not on file      Family History   Problem Relation Age of Onset   • Anuerysm Mother    • Colon cancer Father    • "Hypertension Father    • Heart disease Sister    • Heart attack Sister    • Breast cancer Sister    • Stomach cancer Brother    • Testicular cancer Brother    • Skin cancer Brother    • Diabetes Son    • Thyroid disease Daughter    • Cleft palate Daughter    • Cleft lip Daughter      Past Surgical History:   Procedure Laterality Date   • BRAIN SURGERY     • CARDIAC CATHETERIZATION     • CHOLECYSTECTOMY     • VENTRICULOPERITONEAL SHUNT         Current Outpatient Medications:   •  Cholecalciferol (Vitamin D3) 10 MCG (400 UNIT) CHEW, Chew 2 tablets daily, Disp: , Rfl:   •  divalproex sodium (DEPAKOTE) 500 mg EC tablet, Take 500 mg by mouth One in the AM and two in the PM, Disp: , Rfl:   •  docusate sodium (COLACE) 100 mg capsule, Take 100 mg by mouth 2 (two) times a day, Disp: , Rfl:   •  fenofibrate (TRICOR) 48 mg tablet, Take 1 tablet (48 mg total) by mouth daily, Disp: 30 tablet, Rfl: 11  •  fluvoxaMINE (LUVOX) 100 mg tablet, Take 100 mg by mouth daily at bedtime  , Disp: , Rfl:   •  lactulose (CHRONULAC) 10 g/15 mL solution, Take 10 g by mouth, Disp: , Rfl:   •  levothyroxine 125 mcg tablet, , Disp: , Rfl:   •  losartan (COZAAR) 50 mg tablet, Take 1 tablet (50 mg total) by mouth daily, Disp: 30 tablet, Rfl: 11  •  Melatonin 10 MG TABS, Take 10 mg by mouth daily, Disp: , Rfl:   •  Memantine HCl ER 28 MG CP24, , Disp: , Rfl:   •  mirtazapine (REMERON SOL-TAB) 45 mg dispersible tablet, Take 45 mg by mouth daily at bedtime, Disp: , Rfl:   •  pravastatin (PRAVACHOL) 80 mg tablet, Take 80 mg by mouth daily , Disp: , Rfl:   •  tamsulosin (FLOMAX) 0 4 mg, Take 0 4 mg by mouth daily with dinner , Disp: , Rfl:   •  topiramate (TOPAMAX) 100 mg tablet, Take 100 mg by mouth 2 (two) times a day , Disp: , Rfl:   Allergies   Allergen Reactions   • Ampicillin Other (See Comments)     \"low blood count\"   • Bactrim [Sulfamethoxazole-Trimethoprim] Other (See Comments)     \"low blood count\"   • Bupropion Hives   • Ciprofloxacin Hives " "    ?fasiculations, nausea     • Hydromorphone Hives and Itching   • Nafcillin Other (See Comments)     WBC count down         Labs:     Chemistry        Component Value Date/Time    K 3 7 04/27/2023 1210     (H) 04/27/2023 1210    CO2 22 04/27/2023 1210    BUN 16 04/27/2023 1210    CREATININE 1 52 (H) 04/27/2023 1210        Component Value Date/Time    CALCIUM 8 6 04/27/2023 1210    ALKPHOS 43 04/03/2023 1956    AST 10 (L) 04/03/2023 1956    ALT 10 04/03/2023 1956        ECG 4/3/2023: Sinus bradycardia, otherwise normal ECG  Ventricular rate 57 bpm     Lipid panel 9/2/2022: C 157  T 141  H 41  L 88       Echocardiogram 11/16/2021:   EF 58%  Normal diastolic function  No valvular abnormalities       [facility] nuclear stress test 10/4/2021:  No evidence of scar or ischemia  EF 56%       Lipid panel 5/14/2021: C 136  T 238  H 28  L 71  Review of Systems   Constitutional: Negative  HENT: Negative  Cardiovascular: Negative for chest pain, dyspnea on exertion, irregular heartbeat, leg swelling, near-syncope, orthopnea and palpitations  Respiratory: Negative for cough and snoring  Endocrine: Negative  Skin: Negative  Gastrointestinal: Positive for bloating, constipation and diarrhea  Genitourinary: Negative  Vitals:    05/19/23 1200   BP: 140/88   Pulse:    SpO2:      Vitals:    05/19/23 1145   Weight: 86 5 kg (190 lb 9 6 oz)     Height: 5' 9\" (175 3 cm)   Body mass index is 28 15 kg/m²  Physical Exam  Vitals and nursing note reviewed  Constitutional:       General: He is not in acute distress  Appearance: He is well-developed  He is not diaphoretic  HENT:      Head: Normocephalic and atraumatic  Neck:      Vascular: No carotid bruit or JVD  Cardiovascular:      Rate and Rhythm: Regular rhythm  Bradycardia present  Pulses: Intact distal pulses  Heart sounds: Normal heart sounds, S1 normal and S2 normal  No murmur heard  No friction rub  No gallop        " Comments: No edema  Pulmonary:      Effort: Pulmonary effort is normal  No respiratory distress  Breath sounds: Normal breath sounds  Abdominal:      General: There is no distension  Palpations: Abdomen is soft  Tenderness: There is no abdominal tenderness  Skin:     General: Skin is warm and dry  Findings: No rash  Neurological:      Mental Status: He is alert and oriented to person, place, and time  Psychiatric:         Mood and Affect: Mood normal          Speech: Speech is delayed  Behavior: Behavior normal          Cognition and Memory: Memory is impaired

## 2023-05-19 NOTE — PATIENT INSTRUCTIONS
OK to continue the losartan 50 mg daily  We will watch for lab results at the end of the month  Notify us if BP persistently < 100 or > 140

## 2023-05-21 NOTE — ASSESSMENT & PLAN NOTE
History of lower leg edema  Echocardiogram 11/16/2021 was unremarkable with normal LVEF and normal diastolic function without any significant valvular abnormalities  Previously on HCTZ which has been discontinued due to hypokalemia, elevated creatine  Recommend compression socks and leg elevation when sitting for extended periods of time  Report any new/worsening symptoms

## 2023-05-21 NOTE — ASSESSMENT & PLAN NOTE
Pt reported 2 separate types of chest pain:  He reported history of a mid sternal pressure which had not occurred in quite some time  He also reports intermittent sharp, jabbing pain at his left sternal border  [place] [facility] nuclear stress test performed 10/4/2021 was normal without evidence of ischemia  HR was elevated initially and metoprolol was added, but he did not tolerate due to bradycardia and it was discontinued  HR's have been much improved off of metoprolol  Echocardiogram 11/16/2021 was unremarkable  Continue with BP, lipid and glucose control  Will monitor for recurrent symptoms  Currently awaiting GI evaluation as well

## 2023-05-21 NOTE — ASSESSMENT & PLAN NOTE
Patient with history of labile blood pressure  Blood pressures are currently well controlled at home and in office on losartan 50 mg daily  Accuracy of home BP cuff previously confirmed  HCTZ discontinued due to hypokalemia, elevated creatinine, and lightheadedness  BMP ordered to be drawn this month  Continue 2 g sodium diet and regular moderate activity as tolerated  Will continue to monitor

## 2023-05-21 NOTE — ASSESSMENT & PLAN NOTE
Lipid panel 9/2/2022: C 157  T 141  H 41  L 88  Pravastatin 80mg daily  Triglycerides better controlled on recent lipid panel  Will monitor and consider medication adjustment as needed  Reviewed dietary modification for triglyceride control  If triglycerides would again be elevated we can consider the addition of fenofibrate

## 2023-06-23 ENCOUNTER — TELEPHONE (OUTPATIENT)
Dept: NON INVASIVE DIAGNOSTICS | Facility: HOSPITAL | Age: 63
End: 2023-06-23

## 2023-06-23 NOTE — TELEPHONE ENCOUNTER
Patients spouse left message on office voicemail returning office phone call    Patient or spouse can be reached at [phone]

## 2023-06-23 NOTE — TELEPHONE ENCOUNTER
Can you verify with patient's spouse if he went for the [facility] lab test at the end of May  I am not seeing the results come through on Care Everywhere  If they did not, there is a BMP order in his chart that I ordered for him to complete to monitor his sodium and potassium  Thank you!

## 2023-06-26 ENCOUNTER — APPOINTMENT (OUTPATIENT)
Dept: LAB | Facility: HOSPITAL | Age: 63
End: 2023-06-26
Payer: MEDICARE

## 2023-06-26 DIAGNOSIS — N18.31 STAGE 3A CHRONIC KIDNEY DISEASE (HCC): ICD-10-CM

## 2023-06-26 LAB
ANION GAP SERPL CALCULATED.3IONS-SCNC: 9 MMOL/L
BUN SERPL-MCNC: 17 MG/DL (ref 5–25)
CALCIUM SERPL-MCNC: 9.2 MG/DL (ref 8.4–10.2)
CHLORIDE SERPL-SCNC: 113 MMOL/L (ref 96–108)
CO2 SERPL-SCNC: 21 MMOL/L (ref 21–32)
CREAT SERPL-MCNC: 1.41 MG/DL (ref 0.6–1.3)
GFR SERPL CREATININE-BSD FRML MDRD: 53 ML/MIN/1.73SQ M
GLUCOSE P FAST SERPL-MCNC: 87 MG/DL (ref 65–99)
POTASSIUM SERPL-SCNC: 3.8 MMOL/L (ref 3.5–5.3)
SODIUM SERPL-SCNC: 143 MMOL/L (ref 135–147)

## 2023-06-26 PROCEDURE — 36415 COLL VENOUS BLD VENIPUNCTURE: CPT

## 2023-06-26 PROCEDURE — 80048 BASIC METABOLIC PNL TOTAL CA: CPT

## 2023-06-27 ENCOUNTER — TELEPHONE (OUTPATIENT)
Dept: CARDIOLOGY CLINIC | Facility: CLINIC | Age: 63
End: 2023-06-27

## 2023-06-27 NOTE — TELEPHONE ENCOUNTER
----- Message from Neil Daigle 83 sent at 6/26/2023  6:31 PM EDT -----  Please let patient's wife know that his blood work shows stable kidney function and electrolytes  Thank you!

## 2023-11-01 ENCOUNTER — ANESTHESIA (OUTPATIENT)
Dept: ANESTHESIOLOGY | Facility: HOSPITAL | Age: 63
End: 2023-11-01

## 2023-11-01 ENCOUNTER — ANESTHESIA EVENT (OUTPATIENT)
Dept: ANESTHESIOLOGY | Facility: HOSPITAL | Age: 63
End: 2023-11-01

## 2023-11-01 NOTE — ANESTHESIA PREPROCEDURE EVALUATION
Procedure:  PRE-OP ONLY    Relevant Problems   CARDIO   (+) Chest pain   (+) Hyperlipidemia   (+) Hypertension      ENDO   (+) Hypothyroidism      /RENAL   (+) Benign prostatic hyperplasia with urinary obstruction   (+) Stage 3 chronic kidney disease (HCC)      NEURO/PSYCH   (+) Epileptic seizure ([address])      PULMONARY   (+) Obstructive sleep apnea        Physical Exam    Airway    Mallampati score: II  TM Distance: >3 FB  Neck ROM: full     Dental   No notable dental hx     Cardiovascular  Rhythm: regular, Rate: normal    Pulmonary   Breath sounds clear to auscultation    Other Findings    4/3/23    Sinus bradycardia  Left axis deviation  Inferior infarct (cited on or before 28-SEP-2021)  Abnormal ECG  When compared with ECG of 28-SEP-2021 18:05,  Vent. rate has decreased BY  36 BPM    Anesthesia Plan  ASA Score- 3     Anesthesia Type- IV sedation with anesthesia with ASA Monitors. Additional Monitors:     Airway Plan:            Plan Factors-Exercise tolerance (METS): >4 METS. Chart reviewed. EKG reviewed. Imaging results reviewed. Existing labs reviewed. Patient summary reviewed. Patient is not a current smoker. Patient not instructed to abstain from smoking on day of procedure. Patient did not smoke on day of surgery. There is medical exclusion for perioperative obstructive sleep apnea risk education. Induction- intravenous. Postoperative Plan-     Informed Consent- Anesthetic plan and risks discussed with patient. I personally reviewed this patient with the CRNA. Discussed and agreed on the Anesthesia Plan with the CRNA. .          11/16/21  normal echo

## 2023-11-03 ENCOUNTER — ANESTHESIA (OUTPATIENT)
Dept: GASTROENTEROLOGY | Facility: HOSPITAL | Age: 63
End: 2023-11-03

## 2023-11-03 ENCOUNTER — HOSPITAL ENCOUNTER (OUTPATIENT)
Dept: GASTROENTEROLOGY | Facility: HOSPITAL | Age: 63
Setting detail: OUTPATIENT SURGERY
End: 2023-11-03
Attending: HOSPITALIST
Payer: MEDICARE

## 2023-11-03 ENCOUNTER — ANESTHESIA EVENT (OUTPATIENT)
Dept: GASTROENTEROLOGY | Facility: HOSPITAL | Age: 63
End: 2023-11-03

## 2023-11-03 VITALS
TEMPERATURE: 97.4 F | DIASTOLIC BLOOD PRESSURE: 88 MMHG | RESPIRATION RATE: 18 BRPM | WEIGHT: 190 LBS | BODY MASS INDEX: 28.14 KG/M2 | SYSTOLIC BLOOD PRESSURE: 166 MMHG | OXYGEN SATURATION: 94 % | HEART RATE: 58 BPM | HEIGHT: 69 IN

## 2023-11-03 DIAGNOSIS — K21.9 GASTRO-ESOPHAGEAL REFLUX DISEASE WITHOUT ESOPHAGITIS: ICD-10-CM

## 2023-11-03 DIAGNOSIS — K92.2 ACUTE UPPER GI BLEEDING: Primary | ICD-10-CM

## 2023-11-03 DIAGNOSIS — R13.19 OTHER DYSPHAGIA: ICD-10-CM

## 2023-11-03 DIAGNOSIS — Z80.0 FAMILY HISTORY OF MALIGNANT NEOPLASM OF DIGESTIVE ORGANS: ICD-10-CM

## 2023-11-03 PROCEDURE — 88305 TISSUE EXAM BY PATHOLOGIST: CPT | Performed by: PATHOLOGY

## 2023-11-03 RX ORDER — EPINEPHRINE IN SOD CHLOR,ISO 1 MG/10 ML
SYRINGE (ML) INTRAVENOUS AS NEEDED
Status: COMPLETED | OUTPATIENT
Start: 2023-11-03 | End: 2023-11-03

## 2023-11-03 RX ORDER — SUCRALFATE ORAL 1 G/10ML
1 SUSPENSION ORAL 3 TIMES DAILY
Qty: 300 ML | Refills: 0 | Status: SHIPPED | OUTPATIENT
Start: 2023-11-03 | End: 2023-11-13

## 2023-11-03 RX ORDER — SODIUM CHLORIDE, SODIUM LACTATE, POTASSIUM CHLORIDE, CALCIUM CHLORIDE 600; 310; 30; 20 MG/100ML; MG/100ML; MG/100ML; MG/100ML
75 INJECTION, SOLUTION INTRAVENOUS CONTINUOUS
Status: ACTIVE | OUTPATIENT
Start: 2023-11-03

## 2023-11-03 RX ORDER — SODIUM CHLORIDE, SODIUM LACTATE, POTASSIUM CHLORIDE, CALCIUM CHLORIDE 600; 310; 30; 20 MG/100ML; MG/100ML; MG/100ML; MG/100ML
INJECTION, SOLUTION INTRAVENOUS CONTINUOUS PRN
Status: DISCONTINUED | OUTPATIENT
Start: 2023-11-03 | End: 2023-11-03

## 2023-11-03 RX ORDER — PANTOPRAZOLE SODIUM 40 MG/1
40 TABLET, DELAYED RELEASE ORAL DAILY
Qty: 90 TABLET | Refills: 0 | Status: SHIPPED | OUTPATIENT
Start: 2023-11-03 | End: 2024-02-01

## 2023-11-03 RX ORDER — LIDOCAINE HYDROCHLORIDE 20 MG/ML
INJECTION, SOLUTION EPIDURAL; INFILTRATION; INTRACAUDAL; PERINEURAL AS NEEDED
Status: DISCONTINUED | OUTPATIENT
Start: 2023-11-03 | End: 2023-11-03

## 2023-11-03 RX ORDER — PROPOFOL 10 MG/ML
INJECTION, EMULSION INTRAVENOUS AS NEEDED
Status: DISCONTINUED | OUTPATIENT
Start: 2023-11-03 | End: 2023-11-03

## 2023-11-03 RX ADMIN — PROPOFOL 20 MG: 10 INJECTION, EMULSION INTRAVENOUS at 10:20

## 2023-11-03 RX ADMIN — PROPOFOL 20 MG: 10 INJECTION, EMULSION INTRAVENOUS at 09:52

## 2023-11-03 RX ADMIN — LIDOCAINE HYDROCHLORIDE 100 MG: 20 INJECTION, SOLUTION EPIDURAL; INFILTRATION; INTRACAUDAL; PERINEURAL at 09:35

## 2023-11-03 RX ADMIN — PROPOFOL 20 MG: 10 INJECTION, EMULSION INTRAVENOUS at 10:05

## 2023-11-03 RX ADMIN — PROPOFOL 20 MG: 10 INJECTION, EMULSION INTRAVENOUS at 10:07

## 2023-11-03 RX ADMIN — PROPOFOL 20 MG: 10 INJECTION, EMULSION INTRAVENOUS at 10:00

## 2023-11-03 RX ADMIN — PROPOFOL 20 MG: 10 INJECTION, EMULSION INTRAVENOUS at 09:43

## 2023-11-03 RX ADMIN — Medication 40 MG: at 09:39

## 2023-11-03 RX ADMIN — PROPOFOL 20 MG: 10 INJECTION, EMULSION INTRAVENOUS at 09:55

## 2023-11-03 RX ADMIN — PROPOFOL 20 MG: 10 INJECTION, EMULSION INTRAVENOUS at 09:47

## 2023-11-03 RX ADMIN — PROPOFOL 20 MG: 10 INJECTION, EMULSION INTRAVENOUS at 10:10

## 2023-11-03 RX ADMIN — PROPOFOL 130 MG: 10 INJECTION, EMULSION INTRAVENOUS at 09:35

## 2023-11-03 RX ADMIN — PROPOFOL 30 MG: 10 INJECTION, EMULSION INTRAVENOUS at 09:45

## 2023-11-03 RX ADMIN — PROPOFOL 20 MG: 10 INJECTION, EMULSION INTRAVENOUS at 09:58

## 2023-11-03 RX ADMIN — PROPOFOL 20 MG: 10 INJECTION, EMULSION INTRAVENOUS at 09:49

## 2023-11-03 RX ADMIN — SODIUM CHLORIDE, SODIUM LACTATE, POTASSIUM CHLORIDE, AND CALCIUM CHLORIDE: .6; .31; .03; .02 INJECTION, SOLUTION INTRAVENOUS at 09:33

## 2023-11-03 RX ADMIN — PROPOFOL 20 MG: 10 INJECTION, EMULSION INTRAVENOUS at 10:17

## 2023-11-03 RX ADMIN — PROPOFOL 20 MG: 10 INJECTION, EMULSION INTRAVENOUS at 10:02

## 2023-11-03 RX ADMIN — PROPOFOL 20 MG: 10 INJECTION, EMULSION INTRAVENOUS at 09:39

## 2023-11-03 RX ADMIN — Medication 0.3 MG: at 10:11

## 2023-11-03 RX ADMIN — PROPOFOL 20 MG: 10 INJECTION, EMULSION INTRAVENOUS at 09:37

## 2023-11-03 RX ADMIN — PROPOFOL 20 MG: 10 INJECTION, EMULSION INTRAVENOUS at 10:14

## 2023-11-03 RX ADMIN — PROPOFOL 30 MG: 10 INJECTION, EMULSION INTRAVENOUS at 10:12

## 2023-11-03 NOTE — ANESTHESIA PREPROCEDURE EVALUATION
Procedure:  EGD    Relevant Problems   CARDIO   (+) Chest pain   (+) Hyperlipidemia   (+) Hypertension      ENDO   (+) Hypothyroidism      /RENAL   (+) Benign prostatic hyperplasia with urinary obstruction   (+) Stage 3 chronic kidney disease (HCC)      NEURO/PSYCH   (+) Epileptic seizure (HCC)      PULMONARY   (+) Obstructive sleep apnea        Physical Exam    Airway    Mallampati score: II  TM Distance: >3 FB  Neck ROM: full     Dental   No notable dental hx     Cardiovascular  Rhythm: regular, Rate: normal    Pulmonary   Breath sounds clear to auscultation    Other Findings    4/3/23    Sinus bradycardia  Left axis deviation  Inferior infarct (cited on or before 28-SEP-2021)  Abnormal ECG  When compared with ECG of 28-SEP-2021 18:05,  Vent. rate has decreased BY  36 BPM    Anesthesia Plan  ASA Score- 3     Anesthesia Type- IV sedation with anesthesia with ASA Monitors. Additional Monitors:     Airway Plan:            Plan Factors-Exercise tolerance (METS): >4 METS. Chart reviewed. EKG reviewed. Imaging results reviewed. Existing labs reviewed. Patient summary reviewed. Patient is not a current smoker. Patient not instructed to abstain from smoking on day of procedure. Patient did not smoke on day of surgery. There is medical exclusion for perioperative obstructive sleep apnea risk education. Induction- intravenous. Postoperative Plan-     Informed Consent- Anesthetic plan and risks discussed with patient. I personally reviewed this patient with the CRNA. Discussed and agreed on the Anesthesia Plan with the CRNA. .          11/16/21  normal echo

## 2023-11-03 NOTE — ANESTHESIA POSTPROCEDURE EVALUATION
Post-Op Assessment Note    CV Status:  Stable    Pain management: satisfactory to patient     Mental Status:  Alert and awake   Hydration Status:  Stable   PONV Controlled:  None   Airway Patency:  Patent      Post Op Vitals Reviewed: Yes      Staff: CRNA         No notable events documented.     BP   154/74   Temp   97.4   Pulse 83 (11/03/23 1028)   Resp   14   SpO2 94 % (11/03/23 1028)

## 2023-11-03 NOTE — H&P
Procedure(s):    Esophagogastroduodenuoscopy with the indication(s) of Heartburn family history of "stomach cancer" brother. Endoscopy Pre-Procedure Assessment:  Prior to the procedure, the patient is identified. The patient's history, medications, and allergies have been reviewed. The patient is competent. The risks and benefits of the procedure procedure and the planned sedation have been discussed with the patient. All questions have been answered and informed consent for the procedure has been obtained. Vitals:    11/03/23 0822   BP: 138/72   Pulse: 57   Resp: 20   Temp: 97.8 °F (36.6 °C)   SpO2: 96%       Physical Exam:  Physical Exam  HENT:      Nose: Nose normal.   Eyes:      Conjunctiva/sclera: Conjunctivae normal.   Cardiovascular:      Rate and Rhythm: Normal rate. Pulmonary:      Effort: Pulmonary effort is normal.   Abdominal:      Palpations: Abdomen is soft. Neurological:      General: No focal deficit present. Mental Status: He is alert. Psychiatric:         Mood and Affect: Mood normal.                Consent: We have discussed the procedure in detail. We reviewed risks, benefits and alternative as well as protentional complications including and not limited to medication side effect , infection, bleeding, perforation and the potential need for surgery, ICU admission, CPR, as well as the need for blood product transfusion. Patient verbalized understanding and agreement. All patient questions were answered. After reviewed the risks and benefits, the patient is deemed in satisfactory condition to undergo the procedure. The anesthesia plan is to use monitored anesthesia care (MAC).       11/03/23

## 2023-11-08 PROCEDURE — 88305 TISSUE EXAM BY PATHOLOGIST: CPT | Performed by: PATHOLOGY

## 2023-11-21 NOTE — PROGRESS NOTES
Cardiology Office Visit    Roe Holman  [id number]  1960    [facility]  [address] [facility]      Dear Bob Lofton MD,    I had the pleasure of seeing your patient at our [facility] [place] office today 4/20/2023  As you know he is a pleasant 65y.o. year old male with a medical history as described below. Reason for office visit: Follow up chest pain, hypertension and hyperlipidemia. 1. Weight loss  -     Ambulatory referral to Gastroenterology; Future    2. Primary hypertension  -     Basic metabolic panel; Future  -     losartan (COZAAR) 50 mg tablet; Take 1 tablet (50 mg total) by mouth daily    3. Vitamin B deficiency  -     Vitamin B12    4. Mixed hyperlipidemia    5. Chest pain, unspecified type    6. Localized edema    7. Obstructive sleep apnea    8. Pain of left lower extremity       Plan:  Stop losartan-HCTZ and start losartan 50 mg daily. Labs today to monitor potassium level. Check vitamin B level with blood work. Call us with any change in symptoms or any difficulty in changing the medication. Up to date with other cardiac testing. Referral placed to [facility] per request.   ________________________________        HPI     Patient has a past medical history of hyperlipidemia, hypertension, seizure disorder, thyroid disease, BPH, BEREKET,cerebral aneurysm, IBS, congenital hydrocephalus with  shunt, neuropathy and anxiety. Patient presented to the ER at [facility] 7/28/2021 for evaluation of chest pain. He told his wife that he had left sided chest pain and he tried antacids but did not note any significant change in symptoms. The next day he woke up with chest pain in am and was seen in the ER and given 4 low dose aspirin and nitro and pain resolved. He also had noted some left arm numbness and shortness of breath.  Blood pressure was high on presentation. He was noted to be bradycardiac in ER. Troponin x 2 was negative. Patient was discharged home. Patient was evaluated previously by [facility] 10/27/2017 for lightheadedness, dizziness and palpitations    9/22/2021: Patient presents to the office for evaluation. His memory is impaired and he is scheduled to see neurology next week at [facility]. Patients does notice decreased memory which can also be discussed with neurology. He describes sharp grabbing pain as well as left hand numbness and tingling. He has had recurrent episodes since seen in the ER. He has noted left leg swelling and pain. He noted right sided low back pain. He did undergo cardiac catheterization in the 90's at [facility]. He does use his CPAP consistently. Last chest pain episode was about 2-3 weeks ago. Most recent lipid panel reviewed. Blood pressure has been labile recently. 1/28/2022: Patient returns to the office today for follow up. He was last seen 10/22/2021 by Cornelia Ambrocio at which time results of his initial testing were reviewed (results as outlined below). Patient reports that his blood pressures have been ok at home with occasional lower readings. He does note some lightheadedness when readings are low. Blood pressures are mainly in the 120/80 range. Heart rates are much better off of metoprolol. He denies any recent chest pain. Occasional heart racing. Still has intermittent lower extremity edema. He also notes 'nerve pain' right burning back pain with burning in his feet. Occasional left arm numbness. No significant shortness of breath or dyspnea on exertion. He continues to have left leg apin intermittently. He is doing speech therapy. Scheduled for MRI next Friday. * Patient was seen by Cornelia Ambrocio 4/22/2022 at which time he was doing well overall. 9/9/2022: Patient returns to the office today for follow up. He denies any heart racing/palpitations. No chest pain.  No significant shortness of breath. He continues to have back pain. He does get occasional burning in his feet and back. No recent left arm numbness. He is no longer doing speech therapy. His Depakote dose was recently adjusted. He is not taking magnesium. He is struggling with diarrhea alternating with constipation. No significant lower extremity edema. Lipid panel 9/2/2022 reviewed. 4/20/2023: Justus Dalton returns to the office today. He described sharp pain occurring 20-30 minutes after eating. [place] like an electric path. Into left chest and into left arm. Took his breath away. He told his wife he was having a hard time breathing. Since then he notes occasional 'zaps'. PCP lowered his losartan-HCTZ dose due to lightheadedness. He has noted decreased appetite and weight loss. He stopped magnesium and B12 due to bowel issues. He is waiting for a call from [address] [facility]. Continues to note heaviness in his legs and feels winded when walking in the heat. Lightheadedness seems improved. Occasional 'xtra' beat.          Patient Active Problem List   Diagnosis    Hypertension    Hyperlipidemia    Localized edema    Pain of left lower extremity    Chest pain    Benign prostatic hyperplasia with urinary obstruction    Bipolar depression ([address])    Cerebral aneurysm, nonruptured    Congenital hydrocephalus ([address])    Epileptic seizure ([address])    Hypothyroidism    Memory loss    Obstructive sleep apnea    Diabetes mellitus without complication ([address])    Stage 3 chronic kidney disease ([address])    Vitamin B deficiency    Weight loss     Past Medical History:   Diagnosis Date    Abnormal EKG     Aneurysm of anterior cerebral artery     Anxiety     Bipolar disorder (HCC)     BPH (benign prostatic hyperplasia)     Chronic migraine     CPAP (continuous positive airway pressure) dependence     Depression     Disease of thyroid gland     Hydrocephalus (HCC)     Hyperlipidemia     Hypertension     Hypertriglyceridemia     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     Types: Cigarettes     Quit date:      Years since quittin.9    Smokeless tobacco: Never   Vaping Use    Vaping Use: Never used   Substance and Sexual Activity    Alcohol use: Yes     Comment: extremely rare    Drug use: Never    Sexual activity: Not on file     Comment: defer   Other Topics Concern    Not on file   Social History Narrative    Not on file     Social Determinants of Health     Financial Resource Strain: Not on file   Food Insecurity: Not on file   Transportation Needs: Not on file   Physical Activity: Not on file   Stress: Not on file   Social Connections: Not on file   Intimate Partner Violence: Not on file   Housing Stability: Not on file      Family History   Problem Relation Age of Onset    Anuerysm Mother     Colon cancer Father     Hypertension Father     Heart disease Sister     Heart attack Sister     Breast cancer Sister     Stomach cancer Brother     Testicular cancer Brother     Skin cancer Brother     Diabetes Son     Thyroid disease Daughter     Cleft palate Daughter     Cleft lip Daughter      Past Surgical History:   Procedure Laterality Date    BRAIN SURGERY      CARDIAC CATHETERIZATION      CHOLECYSTECTOMY      VENTRICULOPERITONEAL SHUNT         Current Outpatient Medications:     Cholecalciferol (Vitamin D3) 10 MCG (400 UNIT) CHEW, Chew 2 tablets daily, Disp: , Rfl:     divalproex sodium (DEPAKOTE) 500 mg EC tablet, Take 500 mg by mouth One in the AM and two in the PM, Disp: , Rfl:     docusate sodium (COLACE) 100 mg capsule, Take 100 mg by mouth 2 (two) times a day, Disp: , Rfl:     fluvoxaMINE (LUVOX) 100 mg tablet, Take 100 mg by mouth daily at bedtime  , Disp: , Rfl:     lactulose (CHRONULAC) 10 g/15 mL solution, Take 10 g by mouth, Disp: , Rfl:     levothyroxine 125 mcg tablet, , Disp: , Rfl:     losartan (COZAAR) 50 mg tablet, Take 1 tablet (50 mg total) by mouth daily, Disp: 30 tablet, Rfl: 11    Melatonin 10 MG TABS, Take 10 mg by mouth daily, Disp: , Rfl:     Memantine HCl ER 28 MG CP24, , Disp: , Rfl:     mirtazapine (REMERON SOL-TAB) 45 mg dispersible tablet, Take 45 mg by mouth daily at bedtime, Disp: , Rfl:     pravastatin (PRAVACHOL) 80 mg tablet, Take 80 mg by mouth daily , Disp: , Rfl:     tamsulosin (FLOMAX) 0.4 mg, Take 0.4 mg by mouth daily with dinner , Disp: , Rfl:     topiramate (TOPAMAX) 100 mg tablet, Take 100 mg by mouth 2 (two) times a day , Disp: , Rfl:     fenofibrate (TRICOR) 48 mg tablet, TAKE ONE TABLET BY MOUTH EVERY DAY, Disp: 30 tablet, Rfl: 11    pantoprazole (PROTONIX) 40 mg tablet, Take 1 tablet (40 mg total) by mouth daily, Disp: 90 tablet, Rfl: 0    pregabalin (LYRICA) 50 mg capsule, , Disp: , Rfl:     sucralfate (CARAFATE) 1 g/10 mL suspension, Take 10 mL (1 g total) by mouth 3 (three) times a day for 10 days, Disp: 300 mL, Rfl: 0       Allergies   Allergen Reactions    Ampicillin Other (See Comments)     "low blood count"    Bactrim [Sulfamethoxazole-Trimethoprim] Other (See Comments)     "low blood count"    Bupropion Hives    Ciprofloxacin Hives     ?fasiculations, nausea      Hydromorphone Hives and Itching    Nafcillin Other (See Comments)     WBC count down         Cardiac Test Results:    Lipid panel 9/2/2022: C 157. T 141. H 41. L 88. Echocardiogram 11/16/2021:   EF 58%. Normal diastolic function. No valvular abnormalities. Lexiscan nuclear stress test 10/4/2021:  No evidence of scar or ischemia. EF 56%. Lipid panel 5/14/2021: C 136. T 238. H 28. L 71.       Review of Systems:    Review of Systems   Constitutional:  Negative for activity change, appetite change and fatigue. HENT:  Negative for congestion, hearing loss, tinnitus and trouble swallowing. Eyes:  Negative for visual disturbance. Respiratory:  Negative for cough, chest tightness, shortness of breath and wheezing. Cardiovascular:  Positive for leg swelling. Negative for chest pain and palpitations. Occasional heart racing   Gastrointestinal:  Negative for abdominal distention, abdominal pain, nausea and vomiting. Genitourinary:  Negative for difficulty urinating. Musculoskeletal:  Positive for arthralgias. Burning in feet   Skin:  Negative for rash. Neurological:  Positive for light-headedness and numbness (occasional left arm numbness). Negative for dizziness and syncope. Hematological:  Does not bruise/bleed easily. Psychiatric/Behavioral:  Negative for confusion. The patient is not nervous/anxious. All other systems reviewed and are negative. Vitals:    04/20/23 1105 04/20/23 1218 04/20/23 1219   BP: 128/80 112/82 130/80   BP Location:  Left arm Left arm   Patient Position:  Sitting Standing   Cuff Size:  Adult    Pulse: 66     SpO2: 96%     Weight: 87 kg (191 lb 12.8 oz)     Height: 5' 9" (1.753 m)       Vitals:    04/20/23 1105   Weight: 87 kg (191 lb 12.8 oz)     Height: 5' 9" (175.3 cm)     Physical Exam  Vitals reviewed. Constitutional:       Appearance: He is well-developed. HENT:      Head: Normocephalic and atraumatic. Eyes:      Conjunctiva/sclera: Conjunctivae normal.      Pupils: Pupils are equal, round, and reactive to light. Neck:      Vascular: No JVD. Cardiovascular:      Rate and Rhythm: Normal rate and regular rhythm. Heart sounds: Normal heart sounds. No murmur heard. No friction rub. No gallop. Pulmonary:      Effort: Pulmonary effort is normal.      Breath sounds: Normal breath sounds.    Abdominal:      General: Bowel sounds are normal.      Palpations: Abdomen is soft. Musculoskeletal:      Cervical back: Normal range of motion. Skin:     General: Skin is warm and dry. Neurological:      Mental Status: He is alert and oriented to person, place, and time. Psychiatric:         Speech: Speech is delayed. Behavior: Behavior normal.         Cognition and Memory: Cognition is not impaired. Memory is impaired.

## 2023-12-26 RX ORDER — PYRIDOXINE HCL (VITAMIN B6) 50 MG
1 TABLET ORAL DAILY
COMMUNITY
Start: 2023-07-14

## 2023-12-26 RX ORDER — LOSARTAN POTASSIUM AND HYDROCHLOROTHIAZIDE 12.5; 5 MG/1; MG/1
1 TABLET ORAL DAILY
COMMUNITY
Start: 2023-09-18

## 2023-12-26 RX ORDER — FAMOTIDINE 20 MG/1
20 TABLET, FILM COATED ORAL 2 TIMES DAILY
COMMUNITY
Start: 2023-07-14

## 2023-12-27 ENCOUNTER — OFFICE VISIT (OUTPATIENT)
Dept: CARDIOLOGY CLINIC | Facility: CLINIC | Age: 63
End: 2023-12-27
Payer: MEDICARE

## 2023-12-27 VITALS
OXYGEN SATURATION: 96 % | HEIGHT: 68 IN | HEART RATE: 59 BPM | BODY MASS INDEX: 30.83 KG/M2 | DIASTOLIC BLOOD PRESSURE: 82 MMHG | TEMPERATURE: 97.8 F | WEIGHT: 203.4 LBS | SYSTOLIC BLOOD PRESSURE: 130 MMHG

## 2023-12-27 DIAGNOSIS — R00.2 INTERMITTENT PALPITATIONS: ICD-10-CM

## 2023-12-27 DIAGNOSIS — I10 PRIMARY HYPERTENSION: ICD-10-CM

## 2023-12-27 DIAGNOSIS — R07.9 CHEST PAIN, UNSPECIFIED TYPE: Primary | ICD-10-CM

## 2023-12-27 DIAGNOSIS — N18.31 STAGE 3A CHRONIC KIDNEY DISEASE (HCC): ICD-10-CM

## 2023-12-27 DIAGNOSIS — E11.9 DIABETES MELLITUS WITHOUT COMPLICATION (HCC): ICD-10-CM

## 2023-12-27 PROCEDURE — 99215 OFFICE O/P EST HI 40 MIN: CPT | Performed by: INTERNAL MEDICINE

## 2023-12-27 PROCEDURE — 93000 ELECTROCARDIOGRAM COMPLETE: CPT | Performed by: INTERNAL MEDICINE

## 2023-12-27 NOTE — ASSESSMENT & PLAN NOTE
Mr. Anjum Santos is overall stable from cardiac perspective.  He has recurring chest pain description of which is atypical for angina.  There is no exertional component and there are no accompanying symptoms and symptom is somewhat constant.  He does report intermittent palpitations that are recent.  His blood pressure is well-controlled.  Physical examination is significant for slightly increased BMI.  There is no evidence of significant valvular heart disease or pulmonary or peripheral vascular congestion.  His blood work from normal indicates stable renal function which is borderline decreased.  He has slightly elevated lipids with non-HDL cholesterol of 126.  He does have an elevated ASCVD risk score.    -- At this time I am advising him to continue current cardiac medications.  -- He went there palpitations I am arranging for him to undergo an extended Holter monitor.  -- He is encouraged to try to increase physical activity and modify diet and to eat healthy to lose weight.  -- I do want to see him back in 3 months time to review the results of extended Holter monitor and consider the need for further testing.   -- Dietary and medical compliance are reinforced.  -- He is advised  to report any concerning symptoms such as chest pain, shortness of breath, decline in exercise tolerance or presyncope/syncope.    I have spent a total time of 50 minutes on 12/27/23 in caring for this patient including reviewing and summarizing prior history and work-up, discussing test results and management options, arrangement of care and documenting of the visit.

## 2023-12-27 NOTE — PROGRESS NOTES
[facility]  [address] [place] [address]  Phone#  [phone]. Fax#  [phone]  *-*-*-*-*-*-*-*-*-*-*-*-*-*-*-*-*-*-*-*-*-*-*-*-*-*-*-*-*-*-*-*-*-*-*-*-*-*-*-*-*-*-*-*-*-*-*-*-*-*-*-*-*-*  ENCOUNTER DATE: 12/27/23 2:48 PM  PATIENT NAME: Anjum Santos   1960    [id number]  AGE:63 y.o.      SEX: male  ENCOUNTER PROVIDER:Iraj Soto MD     PRIMARY CARE PHYSICIAN: Elise Cadena MD    DIAGNOSES:  1. Primary hypertension  2.  Mixed dyslipidemia  3.  Obstructive sleep apnea, on CPAP therapy  4.  History of chest pains  5.  Peripheral venous disease.  6.  Seizure disorder  7.  Thyroid disease  8.  History of congenital hydrocephalus status post ventriculoperitoneal shunt placement intiially at age 14 yrs, with revision at age 17 years, 19 years and then in 1980s.  9.  Irritable bowel syndrome  10.  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  Peripheral neuropathy  12.  Anxiety  13. Stage III chronic kidney disease  14.  Diabetes mellitus  15.  Small hiatal hernia  16.  Stomach polyps with history of bleeding during extraction.  17. Cognitive impairment and memory issues.  18. Migraine headaches, on Botox therapy.    ECHOCARDIOGRAM 11/16/2021:  Normal left atrial size and systolic function, EF 58%, normal diastolic function.  Normal RV size and function.  Normal left and right atrial size.  Normal aortic valve without stenosis or regurgitation.  Normal  mitral valve without stenosis or regurgitation.  Normal tricuspid valve without regurgitation.  No obvious pulmonary hypertension.  No pericardial effusion.  Ascending aorta diameter.    CARDIAC CATHETERIZATION in 1990s at [facility] in [place].    Nuclear stress test was completed 10/4/2021 which revealed no evidence of scar or ischemia with gated EF 56%.       CURRENT ECG     Results for orders placed or performed in visit on 12/27/23    POCT ECG    Narrative    Sinus rhythm, HR 60 bpm, left axis deviation, possible prior inferior infarction, no significant chamber hypertrophy enlargement, no changes of ischemia.  QTc is borderline prolonged at 452 ms.            CARDIOLOGY ASSESSMENT & PLAN     1. Chest pain, unspecified type  POCT ECG    AMB extended holter monitor      2. Diabetes mellitus without complication (HCC)        3. Stage 3a chronic kidney disease (HCC)        4. Intermittent palpitations  AMB extended holter monitor      5. Primary hypertension          Chest pain  Mr. Anjum Santos is overall stable from cardiac perspective.  He has recurring chest pain description of which is atypical for angina.  There is no exertional component and there are no accompanying symptoms and symptom is somewhat constant.  He does report intermittent palpitations that are recent.  His blood pressure is well-controlled.  Physical examination is significant for slightly increased BMI.  There is no evidence of significant valvular heart disease or pulmonary or peripheral vascular congestion.  His blood work from normal indicates stable renal function which is borderline decreased.  He has slightly elevated lipids with non-HDL cholesterol of 126.  He does have an elevated ASCVD risk score.    -- At this time I am advising him to continue current cardiac medications.  -- He went there palpitations I am arranging for him to undergo an extended Holter monitor.  -- He is encouraged to try to increase physical activity and modify diet and to eat healthy to lose weight.  -- I do want to see him back in 3 months time to review the results of extended Holter monitor and consider the need for further testing.   -- Dietary and medical compliance are reinforced.  -- He is advised  to report any concerning symptoms such as chest pain, shortness of breath, decline in exercise tolerance or presyncope/syncope.    INTERVAL HISTORY, HISTORY OF PRESENT ILLNESS &  COMPREHENSIVE VISIT INFORMATION     Anjum Santos is here for follow-up regarding his cardiac comorbidities which include: Primary hypertension, obstructive sleep apnea, dyslipidemia, history of chest pain and other comorbidities.  He was last seen in cardiology office in May 2023.  He is here for follow-up visit accompanied with his wife Analy who is helping with information.  Since last visit he has had no new major diagnosis or hospitalizations.  He did undergo endoscopy which identified small hiatal hernia and presence of stomach polyps.  He had some bleeding during the polyp removal which subsequently resolved.  From a symptom perspective he reports that he continues to experience intermittent chest pain and palpitations.  Symptoms occur randomly and are felt as dull pain across the anterior chest.  Symptoms are almost constant and there is no accompanying shortness of breath or nausea vomiting or diaphoresis.  Symptoms resolved spontaneously.  He denies any passing out or near passing out episodes or orthopnea or PND or worsening pedal edema.  He is not physically very active.  He has had some recent cognitive and functional decline.  Mentions that he uses his CPAP machine regularly.    Functional capacity status: Moderate   (Excellent- >10 METs; Good: (7-10 METs); Moderate (4-7 METs); Poor (<= 4 METs)    Any chronic stressors: None   (feeling of poor health, financial problems, and social isolation etc).    Tobacco or alcohol dependence: He quit smoking over 30 years back.  He drinks alcohol occasionally or rarely.    Current cardiac medications: Losartan HCTZ 50-12.5 mg daily pravastatin 80 mg daily fenofibrate 48 mg daily    Last recent comprehensive blood work available:   Blood work 11/16/2023:  Sodium 149 potassium 4.0 chloride 117 bicarb 21 BUN 20 creatinine 1.5 GFR 54 calcium 9.1  Normal liver function tests  Hemoglobin A1c 5.3  Total cholesterol 189 triglyceride 164 HDL 38 calculated LDL 88  "non-  TSH 5.56 Free T41.0    The 10-year ASCVD risk score (Charly SOLO, et al., 2019) is: 20.7%    Values used to calculate the score:      Age: 63 years      Sex: Male      Is Non- : No      Diabetic: Yes      Tobacco smoker: No      Systolic Blood Pressure: 130 mmHg      Is BP treated: Yes      HDL Cholesterol: 41 mg/dL      Total Cholesterol: 157 mg/dL  (Low risk: Less than <5%; borderline risk: ?5% to <7.5%; intermediate risk: ?7.5% to <20%; high risk:?20%)  Patient's ASCVD risk category: High risk    REVIEW OF SYSTEMS   Positive for: As noted above in HPI  Negative for: All remaining as reviewed below and in HPI.    SYSTEM SYMPTOMS REVIEWED:  General--weight change, fever, night sweats  Respiratory--cough, wheezing, shortness of breath, sputum production  Cardiovascular--chest pain, syncope, dyspnea on exertion, edema, decline in exercise tolerance, claudication   Gastrointestinal--persistent vomiting, diarrhea, abdominal distention, blood in stool   Muscular or skeletal--joint pain or swelling   Neurologic--headaches, syncope, abnormal movement  Hematologic--history of easy bruising and bleeding   Endocrine--thyroid enlargement, heat or cold intolerance, polyuria   Psychiatric--anxiety, depression     *-*-*-*-*-*-*-*-*-*-*-*-*-*-*-*-*-*-*-*-*-*-*-*-*-*-*-*-*-*-*-*-*-*-*-*-*-*-*-*-*-*-*-*-*-*-*-*-*-*-*-*-*-*-  VITAL SIGNS     CURRENT VITAL SIGNS:   Vitals:    12/27/23 1412   BP: 130/82   BP Location: Left arm   Patient Position: Sitting   Cuff Size: Large   Pulse: 59   Temp: 97.8 °F (36.6 °C)   SpO2: 96%   Weight: 92.3 kg (203 lb 6.4 oz)   Height: 5' 8\" (1.727 m)       BMI: Body mass index is 30.93 kg/m².    WEIGHTS:   Wt Readings from Last 25 Encounters:   12/27/23 92.3 kg (203 lb 6.4 oz)   11/03/23 86.2 kg (190 lb)   11/01/23 86.2 kg (190 lb)   05/19/23 86.5 kg (190 lb 9.6 oz)   04/20/23 87 kg (191 lb 12.8 oz)   04/03/23 90.1 kg (198 lb 10.2 oz)   09/09/22 88.2 kg (194 lb 6.4 oz) "   04/22/22 91.4 kg (201 lb 9.6 oz)   01/28/22 91.1 kg (200 lb 12.8 oz)   11/16/21 92.5 kg (204 lb)   10/22/21 92.8 kg (204 lb 8 oz)   09/28/21 93.4 kg (205 lb 14.6 oz)   09/22/21 93.4 kg (206 lb)   07/28/21 94 kg (207 lb 3.7 oz)        *-*-*-*-*-*-*-*-*-*-*-*-*-*-*-*-*-*-*-*-*-*-*-*-*-*-*-*-*-*-*-*-*-*-*-*-*-*-*-*-*-*-*-*-*-*-*-*-*-*-*-*-*-*-  PHYSICAL EXAM     General Appearance:    Alert, cooperative, no distress, appears stated age, increased BMI   Head, Eyes, ENT:    No obvious abnormality, moist mucous mebranes.   Neck:   Supple, no carotid bruit or JVD   Back:     Symmetric, no curvature.   Lungs:     Respirations unlabored. Clear to auscultation bilaterally,    Chest wall:    No tenderness or deformity   Heart:    Regular rate and rhythm, S1 and S2 normal, no murmur, rub  or gallop.   Abdomen:     Soft, non-tender, No obvious masses, or organomegaly   Extremities:   Extremities warm, no cyanosis or edema    Skin:   No6 venostatic changes in lower extremities. Normal skin color, texture, and turgor. No rashes or lesions     *-*-*-*-*-*-*-*-*-*-*-*-*-*-*-*-*-*-*-*-*-*-*-*-*-*-*-*-*-*-*-*-*-*-*-*-*-*-*-*-*-*-*-*-*-*-*-*-*-*-*-*-*-*-  CURRENT MEDICATIONS LIST     Current Outpatient Medications:     Cholecalciferol (Vitamin D3) 10 MCG (400 UNIT) CHEW, Chew 2 tablets daily, Disp: , Rfl:     divalproex sodium (DEPAKOTE) 500 mg EC tablet, Take 500 mg by mouth One in the AM and two in the PM, Disp: , Rfl:     docusate sodium (COLACE) 100 mg capsule, Take 100 mg by mouth 2 (two) times a day, Disp: , Rfl:     fenofibrate (TRICOR) 48 mg tablet, TAKE ONE TABLET BY MOUTH EVERY DAY, Disp: 30 tablet, Rfl: 11    fluvoxaMINE (LUVOX) 100 mg tablet, Take 100 mg by mouth daily at bedtime  , Disp: , Rfl:     levothyroxine 125 mcg tablet, , Disp: , Rfl:     losartan-hydrochlorothiazide (HYZAAR) 50-12.5 mg per tablet, Take 1 tablet by mouth daily, Disp: , Rfl:     Melatonin 10 MG TABS, Take 10 mg by mouth daily, Disp: , Rfl:      "Memantine HCl ER 28 MG CP24, , Disp: , Rfl:     mirtazapine (REMERON SOL-TAB) 45 mg dispersible tablet, Take 45 mg by mouth daily at bedtime, Disp: , Rfl:     pantoprazole (PROTONIX) 40 mg tablet, Take 1 tablet (40 mg total) by mouth daily, Disp: 90 tablet, Rfl: 0    pravastatin (PRAVACHOL) 80 mg tablet, Take 80 mg by mouth daily , Disp: , Rfl:     pregabalin (LYRICA) 50 mg capsule, , Disp: , Rfl:     tamsulosin (FLOMAX) 0.4 mg, Take 0.4 mg by mouth daily with dinner , Disp: , Rfl:     topiramate (TOPAMAX) 100 mg tablet, Take 100 mg by mouth 2 (two) times a day , Disp: , Rfl:     vitamin B-12 (CYANOCOBALAMIN) 100 MCG TABS, Take 1 tablet by mouth daily, Disp: , Rfl:     famotidine (PEPCID) 20 mg tablet, Take 20 mg by mouth 2 (two) times a day (Patient not taking: Reported on 12/27/2023), Disp: , Rfl:     lactulose (CHRONULAC) 10 g/15 mL solution, Take 10 g by mouth (Patient not taking: Reported on 12/27/2023), Disp: , Rfl:     sucralfate (CARAFATE) 1 g/10 mL suspension, Take 10 mL (1 g total) by mouth 3 (three) times a day for 10 days, Disp: 300 mL, Rfl: 0       ALLERGIES     Allergies   Allergen Reactions    Ampicillin Other (See Comments)     \"low blood count\"    Bactrim [Sulfamethoxazole-Trimethoprim] Other (See Comments)     \"low blood count\"    Bupropion Hives    Ciprofloxacin Hives     ?fasiculations, nausea      Hydromorphone Hives and Itching    Nafcillin Other (See Comments)     WBC count down         *-*-*-*-*-*-*-*-*-*-*-*-*-*-*-*-*-*-*-*-*-*-*-*-*-*-*-*-*-*-*-*-*-*-*-*-*-*-*-*-*-*-*-*-*-*-*-*-*-*-*-*-*-*-  LABORATORY DATA   No results found for: \"NA\"  Potassium   Date Value Ref Range Status   06/26/2023 3.8 3.5 - 5.3 mmol/L Final   04/27/2023 3.7 3.5 - 5.3 mmol/L Final   04/03/2023 3.3 (L) 3.5 - 5.3 mmol/L Final     Chloride   Date Value Ref Range Status   06/26/2023 113 (H) 96 - 108 mmol/L Final   04/27/2023 113 (H) 96 - 108 mmol/L Final   04/03/2023 108 96 - 108 mmol/L Final     CO2   Date Value Ref " Range Status   06/26/2023 21 21 - 32 mmol/L Final   04/27/2023 22 21 - 32 mmol/L Final   04/03/2023 25 21 - 32 mmol/L Final     BUN   Date Value Ref Range Status   06/26/2023 17 5 - 25 mg/dL Final   04/27/2023 16 5 - 25 mg/dL Final   04/03/2023 12 5 - 25 mg/dL Final     Creatinine   Date Value Ref Range Status   06/26/2023 1.41 (H) 0.60 - 1.30 mg/dL Final     Comment:     Standardized to IDMS reference method   04/27/2023 1.52 (H) 0.60 - 1.30 mg/dL Final     Comment:     Standardized to IDMS reference method   04/03/2023 1.32 (H) 0.60 - 1.30 mg/dL Final     Comment:     Standardized to IDMS reference method     eGFR   Date Value Ref Range Status   06/26/2023 53 ml/min/1.73sq m Final   04/27/2023 48 ml/min/1.73sq m Final   04/03/2023 57 ml/min/1.73sq m Final     Calcium   Date Value Ref Range Status   06/26/2023 9.2 8.4 - 10.2 mg/dL Final   04/27/2023 8.6 8.4 - 10.2 mg/dL Final   04/03/2023 8.0 (L) 8.4 - 10.2 mg/dL Final     AST   Date Value Ref Range Status   04/03/2023 10 (L) 13 - 39 U/L Final   07/28/2021 14 5 - 45 U/L Final     Comment:     Specimen collection should occur prior to Sulfasalazine administration due to the potential for falsely depressed results.      ALT   Date Value Ref Range Status   04/03/2023 10 7 - 52 U/L Final     Comment:     Specimen collection should occur prior to Sulfasalazine administration due to the potential for falsely depressed results.    07/28/2021 32 12 - 78 U/L Final     Comment:     Specimen collection should occur prior to Sulfasalazine administration due to the potential for falsely depressed results.      Alkaline Phosphatase   Date Value Ref Range Status   04/03/2023 43 34 - 104 U/L Final   07/28/2021 109 46 - 116 U/L Final     WBC   Date Value Ref Range Status   04/03/2023 4.46 4.31 - 10.16 Thousand/uL Final   09/28/2021 6.35 4.31 - 10.16 Thousand/uL Final   09/22/2021 6.62 4.31 - 10.16 Thousand/uL Final     Hemoglobin   Date Value Ref Range Status   04/03/2023 11.2 (L)  "12.0 - 17.0 g/dL Final   09/28/2021 14.9 12.0 - 17.0 g/dL Final   09/22/2021 14.3 12.0 - 17.0 g/dL Final     Platelets   Date Value Ref Range Status   04/03/2023 169 149 - 390 Thousands/uL Final   09/28/2021 200 149 - 390 Thousands/uL Final   09/22/2021 209 149 - 390 Thousands/uL Final     PTT   Date Value Ref Range Status   04/03/2023 26 23 - 37 seconds Final     Comment:     Therapeutic Heparin Range =  60-90 seconds   07/28/2021 27 23 - 37 seconds Final     Comment:     Therapeutic Heparin Range =  60-90 seconds     INR   Date Value Ref Range Status   04/03/2023 1.02 0.84 - 1.19 Final   07/28/2021 1.02 0.84 - 1.19 Final     No results found for: \"CKMB\", \"DIGOXIN\"  No results found for: \"TSH\"  No results found for: \"CHOL\"   Hemoglobin A1C   Date Value Ref Range Status   11/16/2023 5.3 4.0 - 5.6 % Final     Comment:     The use of HbA1c to monitor glycemic status is based on normal hemoglobin and HbA composition. This test should not be used in patients with abnormal hemoglobin that affects the half life of the red blood cell or the in vivo glycation rates.      No results found for: \"BLOODCX\", \"SPUTUMCULTUR\", \"GRAMSTAIN\", \"URINECX\", \"WOUNDCULT\", \"BODYFLUIDCUL\", \"MRSACULTURE\", \"INFLUAPCR\", \"INFLUBPCR\", \"RSVPCR\", \"LEGIONELLAUR\", \"CDIFFTOXINB\"    *-*-*-*-*-*-*-*-*-*-*-*-*-*-*-*-*-*-*-*-*-*-*-*-*-*-*-*-*-*-*-*-*-*-*-*-*-*-*-*-*-*-*-*-*-*-*-*-*-*-*-*-*-*-  PREVIOUS CARDIOLOGY & RADIOLOGY TEST RESULTS   I have personally reviewed pertinent results of cardiovascular tests noted below.    No results found for this or any previous visit.    No results found for this or any previous visit.    No results found for this or any previous visit.    No results found for this or any previous visit.    EGD  Narrative: Table formatting from the original result was not included.  [facility]  [address] [address] [phone]    DATE OF SERVICE:  11/03/23    PHYSICIAN(S):  Attending:   Inez " Vaughn Foster MD     Fellow:   No Staff Documented     INDICATION:  Other dysphagia, Gastro-esophageal reflux disease without esophagitis,   Family history of malignant neoplasm of digestive organs    POST-OP DIAGNOSIS:  See the impression below.    PREPROCEDURE:  Informed consent was obtained for the procedure, including sedation.    Risks of perforation, hemorrhage, adverse drug reaction and aspiration   were discussed. The patient was placed in the left lateral decubitus   position.    Patient was explained about the risks and benefits of the procedure. Risks   including but not limited to bleeding, infection, and perforation were   explained in detail. Also explained about less than 100% sensitivity with   the exam and other alternatives.    PROCEDURE: EGD    DETAILS OF PROCEDURE:   Patient was taken to the procedure room where a time out was performed to   confirm correct patient and correct procedure. The patient underwent   monitored anesthesia care, which was administered by an anesthesia   professional. The patient's blood pressure, heart rate, level of   consciousness, respirations and oxygen were monitored throughout the   procedure. The scope was advanced to the second part of the duodenum.   Retroflexion was performed in the fundus. The patient experienced no blood   loss. The procedure was not difficult. The patient tolerated the procedure   well. There were no apparent adverse events.     ANESTHESIA INFORMATION:  ASA: III  Anesthesia Type: IV Sedation with Anesthesia    MEDICATIONS:  No administrations occurring from 0933 to 0934 on 11/03/23     FINDINGS:  Regular Z-line 37 cm from the incisors  Small hiatal hernia  Four inflammatory, semi-pedunculated polyps measuring from 4 mm up to 12   mm in the cardia, body of the stomach and pylorus; bleeding occurred   before intervention; completely removed en bloc by hot snare and retrieved   specimen; placed 4 clips successfully (clips are MRI conditional);    hemostasis achieved; injected 3 mL of epinephrine to address bleeding;   hemostasis achieved. Cap assisted Polypectomy was Performed.  Cap Spontaneously detached in the Duodenum and was retrieved using a   rescue net.  The largest one was in the pylorus and was bleeding.  The duodenal bulb, 1st part of the duodenum and 2nd part of the duodenum   appeared normal.  2 small polypoid area- inflmmatory type were in the forming phase were   noted in the antrum- no intervention    SPECIMENS:  * No specimens in log *  Impression: Small hiatal hernia  Four polyps measuring from 4 mm up to 12 mm in the cardia, body of the   stomach and pylorus; bleeding occurred before intervention; removed by hot   snare; placed 4 clips successfully; hemostasis achieved; injected   epinephrine to address bleeding; hemostasis achieved  The duodenal bulb, 1st part of the duodenum and 2nd part of the duodenum   appeared normal.    RECOMMENDATION:   Await pathology results    Continue PPI  Start Carafate 1 gm TID  Avoid NSAIDs  Avoid smoking  Repeat in 6 months.               Inez Foster MD         *-*-*-*-*-*-*-*-*-*-*-*-*-*-*-*-*-*-*-*-*-*-*-*-*-*-*-*-*-*-*-*-*-*-*-*-*-*-*-*-*-*-*-*-*-*-*-*-*-*-*-*-*-*-  SIGNATURES:   @SIG@   Iraj Soto MD; A    *-*-*-*-*-*-*-*-*-*-*-*-*-*-*-*-*-*-*-*-*-*-*-*-*-*-*-*-*-*-*-*-*-*-*-*-*-*-*-*-*-*-*-*-*-*-*-*-*-*-*-*-*-*-  PAST MEDICAL HISTORY:  Past Medical History:   Diagnosis Date    Abnormal EKG     Aneurysm of anterior cerebral artery     Anxiety     Bipolar disorder (HCC)     BPH (benign prostatic hyperplasia)     Chronic migraine     CPAP (continuous positive airway pressure) dependence     Depression     Disease of thyroid gland     Hydrocephalus (HCC)     Hyperlipidemia     Hypertension     Hypertriglyceridemia     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     Neutropenia (HCC)     Primary thrombocytopenia (HCC)     Seizure (HCC)     Sleep apnea      "PAST SURGICAL HISTORY:  Past Surgical History:   Procedure Laterality Date    BRAIN SURGERY      CARDIAC CATHETERIZATION      CHOLECYSTECTOMY      VENTRICULOPERITONEAL SHUNT           FAMILY HISTORY:  Family History   Problem Relation Age of Onset    Anuerysm Mother     Colon cancer Father     Hypertension Father     Heart disease Sister     Heart attack Sister     Breast cancer Sister     Stomach cancer Brother     Testicular cancer Brother     Skin cancer Brother     Diabetes Son     Thyroid disease Daughter     Cleft palate Daughter     Cleft lip Daughter     SOCIAL HISTORY:  Social History     Tobacco Use   Smoking Status Former    Current packs/day: 0.00    Types: Cigarettes    Start date:     Quit date:     Years since quittin.0   Smokeless Tobacco Never      Social History     Substance and Sexual Activity   Alcohol Use Yes    Comment: extremely rare     Social History     Substance and Sexual Activity   Drug Use Never    [unfilled]     *-*-*-*-*-*-*-*-*-*-*-*-*-*-*-*-*-*-*-*-*-*-*-*-*-*-*-*-*-*-*-*-*-*-*-*-*-*-*-*-*-*-*-*-*-*-*-*-*-*-*-*-*-*  ALLERGIES:  Allergies   Allergen Reactions    Ampicillin Other (See Comments)     \"low blood count\"    Bactrim [Sulfamethoxazole-Trimethoprim] Other (See Comments)     \"low blood count\"    Bupropion Hives    Ciprofloxacin Hives     ?fasiculations, nausea      Hydromorphone Hives and Itching    Nafcillin Other (See Comments)     WBC count down      CURRENT SCHEDULED MEDICATIONS:    Current Outpatient Medications:     Cholecalciferol (Vitamin D3) 10 MCG (400 UNIT) CHEW, Chew 2 tablets daily, Disp: , Rfl:     divalproex sodium (DEPAKOTE) 500 mg EC tablet, Take 500 mg by mouth One in the AM and two in the PM, Disp: , Rfl:     docusate sodium (COLACE) 100 mg capsule, Take 100 mg by mouth 2 (two) times a day, Disp: , Rfl:     fenofibrate (TRICOR) 48 mg tablet, TAKE ONE TABLET BY MOUTH EVERY DAY, Disp: 30 tablet, Rfl: 11    fluvoxaMINE (LUVOX) 100 mg tablet, Take 100 " mg by mouth daily at bedtime  , Disp: , Rfl:     levothyroxine 125 mcg tablet, , Disp: , Rfl:     losartan-hydrochlorothiazide (HYZAAR) 50-12.5 mg per tablet, Take 1 tablet by mouth daily, Disp: , Rfl:     Melatonin 10 MG TABS, Take 10 mg by mouth daily, Disp: , Rfl:     Memantine HCl ER 28 MG CP24, , Disp: , Rfl:     mirtazapine (REMERON SOL-TAB) 45 mg dispersible tablet, Take 45 mg by mouth daily at bedtime, Disp: , Rfl:     pantoprazole (PROTONIX) 40 mg tablet, Take 1 tablet (40 mg total) by mouth daily, Disp: 90 tablet, Rfl: 0    pravastatin (PRAVACHOL) 80 mg tablet, Take 80 mg by mouth daily , Disp: , Rfl:     pregabalin (LYRICA) 50 mg capsule, , Disp: , Rfl:     tamsulosin (FLOMAX) 0.4 mg, Take 0.4 mg by mouth daily with dinner , Disp: , Rfl:     topiramate (TOPAMAX) 100 mg tablet, Take 100 mg by mouth 2 (two) times a day , Disp: , Rfl:     vitamin B-12 (CYANOCOBALAMIN) 100 MCG TABS, Take 1 tablet by mouth daily, Disp: , Rfl:     famotidine (PEPCID) 20 mg tablet, Take 20 mg by mouth 2 (two) times a day (Patient not taking: Reported on 12/27/2023), Disp: , Rfl:     lactulose (CHRONULAC) 10 g/15 mL solution, Take 10 g by mouth (Patient not taking: Reported on 12/27/2023), Disp: , Rfl:     sucralfate (CARAFATE) 1 g/10 mL suspension, Take 10 mL (1 g total) by mouth 3 (three) times a day for 10 days, Disp: 300 mL, Rfl: 0     *-*-*-*-*-*-*-*-*-*-*-*-*-*-*-*-*-*-*-*-*-*-*-*-*-*-*-*-*-*-*-*-*-*-*-*-*-*-*-*-*-*-*-*-*-*-*-*-*-*-*-*-*-*

## 2023-12-27 NOTE — PATIENT INSTRUCTIONS
CARDIOLOGY ASSESSMENT & PLAN     1. Chest pain, unspecified type  POCT ECG    AMB extended holter monitor      2. Diabetes mellitus without complication (HCC)        3. Stage 3a chronic kidney disease (HCC)        4. Intermittent palpitations  AMB extended holter monitor      5. Primary hypertension          Chest pain  Mr. Anjum Santos is overall stable from cardiac perspective.  He has recurring chest pain description of which is atypical for angina.  There is no exertional component and there are no accompanying symptoms and symptom is somewhat constant.  He does report intermittent palpitations that are recent.  His blood pressure is well-controlled.  Physical examination is significant for slightly increased BMI.  There is no evidence of significant valvular heart disease or pulmonary or peripheral vascular congestion.  His blood work from normal indicates stable renal function which is borderline decreased.  He has slightly elevated lipids with non-HDL cholesterol of 126.  He does have an elevated ASCVD risk score.    -- At this time I am advising him to continue current cardiac medications.  -- He went there palpitations I am arranging for him to undergo an extended Holter monitor.  -- He is encouraged to try to increase physical activity and modify diet and to eat healthy to lose weight.  -- I do want to see him back in 3 months time to review the results of extended Holter monitor and consider the need for further testing.   -- Dietary and medical compliance are reinforced.  -- He is advised  to report any concerning symptoms such as chest pain, shortness of breath, decline in exercise tolerance or presyncope/syncope.

## 2024-01-22 ENCOUNTER — CLINICAL SUPPORT (OUTPATIENT)
Dept: CARDIOLOGY CLINIC | Facility: CLINIC | Age: 64
End: 2024-01-22
Payer: MEDICARE

## 2024-01-22 DIAGNOSIS — R07.9 CHEST PAIN, UNSPECIFIED TYPE: ICD-10-CM

## 2024-01-22 DIAGNOSIS — R00.2 INTERMITTENT PALPITATIONS: ICD-10-CM

## 2024-01-22 PROCEDURE — 93248 EXT ECG>7D<15D REV&INTERPJ: CPT | Performed by: INTERNAL MEDICINE

## 2024-02-19 ENCOUNTER — TELEPHONE (OUTPATIENT)
Dept: CARDIOLOGY CLINIC | Facility: CLINIC | Age: 64
End: 2024-02-19

## 2024-02-19 NOTE — TELEPHONE ENCOUNTER
----- Message from Iraj Soto MD sent at 2/18/2024  9:22 PM EST -----  -- Patient was monitored for 14 days between 12/31/2023 and 1/14/2024.  -- Average heart rate was 63 bpm minimum heart rate was 41 bpm and maximum heart rate was 182 bpm.  -- Predominant rhythm was normal sinus.  Atrioventricular and interventricular conduction was normal.  -- There were 21 runs of supraventricular tachycardia with the run with the fastest interval lasting 9 beats (maximum rate of 182 bpm) and longest run lasting 17.9 seconds with an average rate of 127 bpm.  -- There was no occurrence of ventricular tachycardia events or clinically significant pause events or Mobitz type II second-degree or higher AV block or atrial fibrillation.  -- There were rare, less than 1% supraventricular ectopic beats, supraventricular couplets and supraventricular triplets.  -- There were rare isolated ventricular ectopic beats and ventricular couplets.  There were no ventricular triplets.  -- There were 0 triggered events and 0 diary entries.    Conclusion: Extended Holter monitor demonstrates predominant normal sinus rhythm with episodic nonsustained supraventricular tachycardia events.  No symptoms were reported in relation with arrhythmia events.  Beta-blocker therapy can be considered in the future if patient is symptomatic.    Iraj Soto MD

## 2024-02-19 NOTE — TELEPHONE ENCOUNTER
Phone call to patient.  Spoke with Wife.  Per Dr. Soto  monitor showed  mostly normal sinus rhythm with some nonsustained supraventricular tachycardia events. No symptoms were reported in relation with arrhythmia events. Beta-blocker therapy can be considered in the future if patient is symptomatic.   Can be discussed further at April 19 appointment, or if patient has further questions he can call the office.    Wife understood results and instructions.

## 2024-04-19 ENCOUNTER — OFFICE VISIT (OUTPATIENT)
Dept: CARDIOLOGY CLINIC | Facility: CLINIC | Age: 64
End: 2024-04-19
Payer: MEDICARE

## 2024-04-19 VITALS
HEIGHT: 68 IN | HEART RATE: 58 BPM | SYSTOLIC BLOOD PRESSURE: 140 MMHG | DIASTOLIC BLOOD PRESSURE: 88 MMHG | OXYGEN SATURATION: 97 % | WEIGHT: 204.8 LBS | TEMPERATURE: 97.8 F | BODY MASS INDEX: 31.04 KG/M2

## 2024-04-19 DIAGNOSIS — I10 PRIMARY HYPERTENSION: Primary | ICD-10-CM

## 2024-04-19 DIAGNOSIS — R00.2 INTERMITTENT PALPITATIONS: ICD-10-CM

## 2024-04-19 DIAGNOSIS — E78.2 MIXED HYPERLIPIDEMIA: ICD-10-CM

## 2024-04-19 DIAGNOSIS — G47.33 OBSTRUCTIVE SLEEP APNEA: ICD-10-CM

## 2024-04-19 DIAGNOSIS — N18.31 STAGE 3A CHRONIC KIDNEY DISEASE (HCC): ICD-10-CM

## 2024-04-19 DIAGNOSIS — R07.9 CHEST PAIN, UNSPECIFIED TYPE: ICD-10-CM

## 2024-04-19 PROCEDURE — 99214 OFFICE O/P EST MOD 30 MIN: CPT | Performed by: NURSE PRACTITIONER

## 2024-04-19 RX ORDER — FENOFIBRATE 48 MG/1
48 TABLET, COATED ORAL DAILY
Qty: 90 TABLET | Refills: 3 | Status: SHIPPED | OUTPATIENT
Start: 2024-04-19

## 2024-04-19 RX ORDER — LOSARTAN POTASSIUM 50 MG/1
50 TABLET ORAL DAILY
COMMUNITY
End: 2024-04-19 | Stop reason: SDUPTHER

## 2024-04-19 RX ORDER — PREGABALIN 150 MG/1
CAPSULE ORAL
COMMUNITY
Start: 2024-03-21

## 2024-04-19 RX ORDER — OMEPRAZOLE 20 MG/1
20 TABLET, DELAYED RELEASE ORAL DAILY
COMMUNITY

## 2024-04-19 RX ORDER — LOSARTAN POTASSIUM 50 MG/1
50 TABLET ORAL DAILY
Qty: 90 TABLET | Refills: 3 | Status: SHIPPED | OUTPATIENT
Start: 2024-04-19

## 2024-04-19 NOTE — ASSESSMENT & PLAN NOTE
Pt reported 2 separate types of chest pain:  He reported history of a mid sternal pressure which had not occurred in quite some time.  He also reports intermittent sharp, jabbing pain at his left sternal border.  Lexiscan nuclear stress test performed 10/4/2021 was normal without evidence of ischemia.  HR was elevated initially and metoprolol was added, but he did not tolerate due to bradycardia and it was discontinued.   HR's have been much improved off of metoprolol.   Echocardiogram 11/16/2021 was unremarkable.   Continue with BP, lipid and glucose control.   Will monitor for recurrent symptoms.

## 2024-04-19 NOTE — ASSESSMENT & PLAN NOTE
Lab Results   Component Value Date    EGFR 58 (L) 03/29/2024    EGFR 54 (L) 11/16/2023    EGFR 53 06/26/2023    CREATININE 1.4 (H) 03/29/2024    CREATININE 1.5 (H) 11/16/2023    CREATININE 1.41 (H) 06/26/2023     Recent lab work with stable renal function. Avoiding nephrotoxic agents.

## 2024-04-19 NOTE — ASSESSMENT & PLAN NOTE
"Intermittent \"fluttering\" reported.  14 day ZIO 12/31-1/14/2024 shows SR, avg HR 63 bpm. 21 runs of SVT, longest 17.9 seconds. Rare PAC's/PVC's.  He has not tolerated metoprolol in the past.  Given slow heart rates will avoid AV ivis blocking agent at this time. Could consider diltiazem if needed.  Trial magnesium replacement.  He will monitor symptoms.  "

## 2024-04-19 NOTE — ASSESSMENT & PLAN NOTE
Lipid panel 9/2/2022: C 157. T 141. H 41. L 88.   Pravastatin 80mg daily.  Fenofibrate 48mg daily.  Has orders for PCP for follow up labs.

## 2024-04-19 NOTE — PATIENT INSTRUCTIONS
Resume losartan potassium 50 mg daily. Monitor BP a few times per week and notify our office if persistently < 100/60 or > 140/90.  Continue fenofibrate. Recheck cholesterol in November.  Your heart monitor showed sinus rhythm with an avg HR 63 bpm. You had occasional episodes of rapid heart beat which were very short lived. At this time no need for prescription treatment. You can try magnesium glycinate 100 mg daily.

## 2024-04-19 NOTE — ASSESSMENT & PLAN NOTE
Patient with history of labile blood pressure.  BP elevated today. He is out of losartan. Reordered. Wife will monitor BP at home.  Accuracy of home BP cuff previously confirmed.  HCTZ discontinued due to hypokalemia, elevated creatinine, and lightheadedness.  Continue 2 g sodium diet and regular moderate activity as tolerated.  Will continue to monitor.

## 2024-04-19 NOTE — PROGRESS NOTES
"Cardiology Follow Up    Anjum Santos  1960  [id number]  [facility] [place] [address]  [address]  [facility] [address] [phone]    Anjum presents today for routine follow up for chest pain, palpitations, hypertension and hyperlipidemia. Review ZIO results.    1. Primary hypertension  Assessment & Plan:  Patient with history of labile blood pressure.  BP elevated today. He is out of losartan. Reordered. Wife will monitor BP at home.  Accuracy of home BP cuff previously confirmed.  HCTZ discontinued due to hypokalemia, elevated creatinine, and lightheadedness.  Continue 2 g sodium diet and regular moderate activity as tolerated.  Will continue to monitor.     Orders:  -     losartan (COZAAR) 50 mg tablet; Take 1 tablet (50 mg total) by mouth daily    2. Mixed hyperlipidemia  Assessment & Plan:  Lipid panel 9/2/2022: C 157. T 141. H 41. L 88.   Pravastatin 80mg daily.  Fenofibrate 48mg daily.  Has orders for PCP for follow up labs.    Orders:  -     fenofibrate (TRICOR) 48 mg tablet; Take 1 tablet (48 mg total) by mouth daily    3. Stage 3a chronic kidney disease (HCC)  Assessment & Plan:  Lab Results   Component Value Date    EGFR 58 (L) 03/29/2024    EGFR 54 (L) 11/16/2023    EGFR 53 06/26/2023    CREATININE 1.4 (H) 03/29/2024    CREATININE 1.5 (H) 11/16/2023    CREATININE 1.41 (H) 06/26/2023     Recent lab work with stable renal function. Avoiding nephrotoxic agents.      4. Intermittent palpitations  Assessment & Plan:  Intermittent \"fluttering\" reported.  14 day ZIO 12/31-1/14/2024 shows SR, avg HR 63 bpm. 21 runs of SVT, longest 17.9 seconds. Rare PAC's/PVC's.  He has not tolerated metoprolol in the past.  Given slow heart rates will avoid AV ivis blocking agent at this time. Could consider diltiazem if needed.  Trial magnesium replacement.  He will monitor symptoms.      5. Chest pain, unspecified type  Assessment & Plan:  Pt reported 2 " separate types of chest pain:  He reported history of a mid sternal pressure which had not occurred in quite some time.  He also reports intermittent sharp, jabbing pain at his left sternal border.  Lexiscan nuclear stress test performed 10/4/2021 was normal without evidence of ischemia.  HR was elevated initially and metoprolol was added, but he did not tolerate due to bradycardia and it was discontinued.   HR's have been much improved off of metoprolol.   Echocardiogram 11/16/2021 was unremarkable.   Continue with BP, lipid and glucose control.   Will monitor for recurrent symptoms.      6. Obstructive sleep apnea  Assessment & Plan:  He reports compliance with CPAP.          SATISH Valero has a past medical history of hyperlipidemia, hypertension, seizure disorder, thyroid disease, BPH, BEREKET,cerebral aneurysm, IBS, congenital hydrocephalus with  shunt, neuropathy and anxiety.     Patient was evaluated previously by [facility] 10/27/2017 for lightheadedness, dizziness and palpitations. He did undergo cardiac catheterization in the 90[facility] at [facility] in [place].      He presented to [facility] ER 7/28/2021 for evaluation of chest pain. He told his wife that he had left sided chest pain and he tried antacids but did not note any significant change in symptoms. The next day he woke up with chest pain in am and was seen in the ER and given 4 low dose aspirin and nitro and pain resolved. He also had noted some left arm numbness and shortness of breath. Blood pressure was high on presentation. He was noted to be bradycardiac in ER. Troponin x 2 was negative. Patient was discharged home.      He met in consultation with Dr. Aguilar on 9/22/2021. He reported a sharp, grabbing pain as well as left hand numbness and tingling recurring since his ER visit. Last chest pain episode was about 2-3 weeks ago. He was also experiencing left leg swelling and pain. Using CPAP consistently. Blood pressure had been labile  recently. HR was slow at 51 bpm and his metoprolol succinate was discontinued. His losartan was replaced with losartan/HCTZ 100/25 mg daily. Lab work was ordered. LLE u/s was ordered due to leg pain. Nuclear stress testing was also ordered.     He was seen at [facility] ER 9/28/2021 for hypertension. Potassium was low at 3.1. He had not yet taken his BP meds that day. He was given BP medication and a dose of potassium.     Nuclear stress test was completed 10/4/2021 which revealed no evidence of scar or ischemia with gated EF 56%.  His lower extremity duplex was negative for DVT.     He followed up with Dr. Aguilar on 4/20/2023. His PCP had lowered his losartan/HCTZ from 100/25 to 50/12.5 mg on 3/9/2023 due to lightheaded episodes and hypotension at the visit. He was evaluated at [facility] ER 4/3/2023 for a stabbing chest pain which occurred after eating and resolved on its own. Cardiac work up was unrevealing. BP acceptable. Lab work was notable for hypokalemia at 3.3 and elevated creatinine of 1.32. His HCTZ was discontinued and losartan 50 mg daily prescribed. He was referred to GI due to issues with constipation/diarrhea and abdominal discomfort. Close follow up was arranged on 5/19/2023 at which time BP remained acceptable and lab stable.    He followed up most recently at our office with Dr. Iraj Soto at which time his only complaint was occasional palpitations. ECG showed no acute changes. 2 week ZIO monitoring was ordered which revealed 21 runs of SVT, longest 17.9 seconds with no other concerning findings (see full report below).    4/19/2024: Anjum presents today accompanied by his wife. She provides the history due to his poor recall. We reviewed his ZIO results in detail. He states his palpitations are intermittent and not bothersome. No new cardiac complaints. No chest pain, shortness of breath, edema. He gets sporadic lightheadedness which is short lived. No syncope or near syncope. Recent blood work through  the PCP office shows stable renal function. His BP is elevated today. His wife states she realized he is out of his losartan.     Medical Problems       Problem List       Hypertension    Hyperlipidemia    Chest pain    Localized edema    Pain of left lower extremity    Obstructive sleep apnea    Benign prostatic hyperplasia with urinary obstruction    Bipolar depression (HCC)    Cerebral aneurysm, nonruptured    Congenital hydrocephalus (HCC)    Epileptic seizure (HCC)    Hypothyroidism    Memory loss    Diabetes mellitus without complication (HCC)    Stage 3 chronic kidney disease (HCC)    Vitamin B deficiency    Weight loss    Intermittent palpitations        Past Medical History:   Diagnosis Date    Abnormal EKG     Aneurysm of anterior cerebral artery     Anxiety     Bipolar disorder (HCC)     BPH (benign prostatic hyperplasia)     Chronic migraine     CPAP (continuous positive airway pressure) dependence     Depression     Disease of thyroid gland     Hesitancy of micturition     Hydrocephalus (HCC)     Hyperlipidemia     Hypertension     Hypertriglyceridemia     Hypothyroidism     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     Neutropenia (HCC)     Primary thrombocytopenia (HCC)     Seizure (HCC)     Sleep apnea     Vitamin D deficiency      Social History     Socioeconomic History    Marital status: /Civil Union     Spouse name: Not on file    Number of children: Not on file    Years of education: Not on file    Highest education level: Not on file   Occupational History    Not on file   Tobacco Use    Smoking status: Former     Current packs/day: 0.00     Types: Cigarettes     Start date:      Quit date:      Years since quittin.3    Smokeless tobacco: Never   Vaping Use    Vaping status: Never Used   Substance and Sexual Activity    Alcohol use: Yes     Comment: extremely rare    Drug use: Never    Sexual activity: Not on file     Comment: defer    Other Topics Concern    Not on file   Social History Narrative    Not on file     Social Determinants of Health     Financial Resource Strain: Not on file   Food Insecurity: No Food Insecurity (7/14/2023)    Received from [employer]    Hunger Vital Sign     Worried About Running Out of Food in the Last Year: Never true     Ran Out of Food in the Last Year: Never true   Transportation Needs: Not on file   Physical Activity: Not on file   Stress: Not on file   Social Connections: Not on file   Intimate Partner Violence: Not on file   Housing Stability: Not on file      Family History   Problem Relation Age of Onset    Anuerysm Mother     Colon cancer Father     Hypertension Father     Heart disease Sister     Heart attack Sister     Breast cancer Sister     Stomach cancer Brother     Testicular cancer Brother     Skin cancer Brother     Diabetes Son     Thyroid disease Daughter     Cleft palate Daughter     Cleft lip Daughter      Past Surgical History:   Procedure Laterality Date    BRAIN SURGERY      CARDIAC CATHETERIZATION      CHOLECYSTECTOMY      VENTRICULOPERITONEAL SHUNT         Current Outpatient Medications:     Cholecalciferol (Vitamin D3) 10 MCG (400 UNIT) CHEW, Chew 2 tablets daily, Disp: , Rfl:     divalproex sodium (DEPAKOTE) 500 mg EC tablet, Take 500 mg by mouth One in the AM and two in the PM, Disp: , Rfl:     docusate sodium (COLACE) 100 mg capsule, Take 100 mg by mouth 2 (two) times a day, Disp: , Rfl:     fenofibrate (TRICOR) 48 mg tablet, Take 1 tablet (48 mg total) by mouth daily, Disp: 90 tablet, Rfl: 3    fluvoxaMINE (LUVOX) 100 mg tablet, Take 100 mg by mouth daily at bedtime  , Disp: , Rfl:     levothyroxine 125 mcg tablet, , Disp: , Rfl:     losartan (COZAAR) 50 mg tablet, Take 1 tablet (50 mg total) by mouth daily, Disp: 90 tablet, Rfl: 3    Melatonin 10 MG TABS, Take 10 mg by mouth daily, Disp: , Rfl:     Memantine HCl ER 28 MG CP24, , Disp: , Rfl:     mirtazapine (REMERON SOL-TAB) 45 mg  "dispersible tablet, Take 45 mg by mouth daily at bedtime, Disp: , Rfl:     omeprazole (PriLOSEC OTC) 20 MG tablet, Take 20 mg by mouth daily, Disp: , Rfl:     pravastatin (PRAVACHOL) 80 mg tablet, Take 80 mg by mouth daily , Disp: , Rfl:     pregabalin (LYRICA) 150 mg capsule, , Disp: , Rfl:     tamsulosin (FLOMAX) 0.4 mg, Take 0.4 mg by mouth daily with dinner , Disp: , Rfl:     topiramate (TOPAMAX) 100 mg tablet, Take 100 mg by mouth 2 (two) times a day , Disp: , Rfl:     vitamin B-12 (CYANOCOBALAMIN) 100 MCG TABS, Take 1 tablet by mouth daily, Disp: , Rfl:   Allergies   Allergen Reactions    Ampicillin Other (See Comments)     \"low blood count\"    Bactrim [Sulfamethoxazole-Trimethoprim] Other (See Comments)     \"low blood count\"    Bupropion Hives    Ciprofloxacin Hives     ?fasiculations, nausea      Hydromorphone Hives and Itching    Nafcillin Other (See Comments)     WBC count down         Labs:     Chemistry        Component Value Date/Time    K 3.9 03/29/2024 1114     (H) 03/29/2024 1114    CO2 24 03/29/2024 1114    BUN 15 03/29/2024 1114    CREATININE 1.4 (H) 03/29/2024 1114        Component Value Date/Time    CALCIUM 9.1 03/29/2024 1114    ALKPHOS 62 11/16/2023 1624    AST 18 11/16/2023 1624    ALT 21 11/16/2023 1624        ZIO 12/31-1/14/2024:  Predominantly sinus rhythm, HR , avg 63 bpm.  21 runs of SVT, longest 17.9 seconds with avg  bpm. No VT, pauses, high grade block, or a fib.  Rare PAC's and rare PVC's.  No triggers.    ECG 12/27/2023:   Sinus rhythm, HR 60 bpm, left axis deviation, possible prior inferior infarction, no significant chamber hypertrophy enlargement, no changes of ischemia.  QTc is borderline prolonged at 452 ms.     ECG 4/3/2023: Sinus bradycardia, otherwise normal ECG. Ventricular rate 57 bpm.     Lipid panel 9/2/2022: C 157. T 141. H 41. L 88.      Echocardiogram 11/16/2021:   EF 58%.   Normal diastolic function.   No valvular abnormalities.      Lexiscan nuclear " "stress test 10/4/2021:  No evidence of scar or ischemia.   EF 56%.      Lipid panel 5/14/2021: C 136. T 238. H 28. L 71.    Review of Systems   Constitutional: Negative.   HENT: Negative.     Cardiovascular:  Positive for palpitations (infrequent). Negative for chest pain, dyspnea on exertion, irregular heartbeat, leg swelling, near-syncope and orthopnea.   Respiratory:  Negative for cough and snoring.    Endocrine: Negative.    Skin: Negative.    Musculoskeletal: Negative.    Gastrointestinal: Negative.    Genitourinary: Negative.    Neurological: Negative.    Psychiatric/Behavioral: Negative.         Vitals:    04/19/24 1535   BP: 140/88   Pulse:    Temp:    SpO2:      Vitals:    04/19/24 1519   Weight: 92.9 kg (204 lb 12.8 oz)     Height: 5' 8\" (172.7 cm)   Body mass index is 31.14 kg/m².    Physical Exam  Vitals and nursing note reviewed.   Constitutional:       General: He is not in acute distress.     Appearance: He is well-developed. He is not diaphoretic.   HENT:      Head: Normocephalic and atraumatic.   Neck:      Vascular: No carotid bruit or JVD.   Cardiovascular:      Rate and Rhythm: Normal rate and regular rhythm.      Pulses: Intact distal pulses.      Heart sounds: Normal heart sounds, S1 normal and S2 normal. No murmur heard.     No friction rub. No gallop.   Pulmonary:      Effort: Pulmonary effort is normal. No respiratory distress.      Breath sounds: Normal breath sounds.   Abdominal:      General: There is no distension.      Palpations: Abdomen is soft.      Tenderness: There is no abdominal tenderness.   Skin:     General: Skin is warm and dry.      Findings: No rash.   Neurological:      Mental Status: He is alert and oriented to person, place, and time.   Psychiatric:         Behavior: Behavior normal.         Cognition and Memory: Cognition is impaired. Memory is impaired.                "

## 2024-04-24 ENCOUNTER — OFFICE VISIT (OUTPATIENT)
Dept: UROLOGY | Facility: CLINIC | Age: 64
End: 2024-04-24
Payer: MEDICARE

## 2024-04-24 VITALS
DIASTOLIC BLOOD PRESSURE: 80 MMHG | TEMPERATURE: 97.4 F | RESPIRATION RATE: 20 BRPM | WEIGHT: 205.2 LBS | HEIGHT: 68 IN | SYSTOLIC BLOOD PRESSURE: 150 MMHG | BODY MASS INDEX: 31.1 KG/M2

## 2024-04-24 DIAGNOSIS — Q03.9 CONGENITAL HYDROCEPHALUS (HCC): ICD-10-CM

## 2024-04-24 DIAGNOSIS — R39.11 HESITANCY OF MICTURITION: ICD-10-CM

## 2024-04-24 DIAGNOSIS — E11.9 DIABETES MELLITUS WITHOUT COMPLICATION (HCC): ICD-10-CM

## 2024-04-24 DIAGNOSIS — N40.1 BENIGN PROSTATIC HYPERPLASIA WITH NOCTURIA: Primary | ICD-10-CM

## 2024-04-24 DIAGNOSIS — R35.1 BENIGN PROSTATIC HYPERPLASIA WITH NOCTURIA: Primary | ICD-10-CM

## 2024-04-24 PROCEDURE — 99204 OFFICE O/P NEW MOD 45 MIN: CPT | Performed by: UROLOGY

## 2024-04-24 RX ORDER — TAMSULOSIN HYDROCHLORIDE 0.4 MG/1
0.4 CAPSULE ORAL 2 TIMES DAILY
Qty: 180 CAPSULE | Refills: 3 | Status: SHIPPED | OUTPATIENT
Start: 2024-04-24

## 2024-04-24 NOTE — PROGRESS NOTES
UROLOGY PROGRESS NOTE         NAME: Anjum Santos  AGE: 63 y.o. SEX: male  : 1960   MRN: [id number]    DATE: 2024  TIME: 2:25 PM    Assessment and Plan      Impression:   1. Benign prostatic hyperplasia with nocturia    2. Hesitancy of micturition  -     Ambulatory Referral to Urology    Patient has significant voiding dysfunction.  Previous laser prostatectomy.  On Flomax.     Plan: We will double the Flomax to twice daily.  Will see him back for cystoscopy to for further evaluation regarding his voiding dysfunction.      Chief Complaint   No chief complaint on file.    History of Present Illness     HPI: Anjum Santos is a 63 y.o. year old male who presents with multiple year history of voiding dysfunction.  Currently has nocturia once a night.  His AUA symptom score is 21.  He has slow flow and intermittent flow incomplete emptying with urinary frequency.  He does not have much urgency at all.  He had a previous laser prostatectomy many years ago.  PSAs have been good.  Recent urinalysis good.  No significant incontinence.  No hematuria.  Recent PSA 0.4.  He has been on Flomax for some time 1 tablet.  Not much help.              The following portions of the patient's history were reviewed and updated as appropriate: allergies, current medications, past family history, past medical history, past social history, past surgical history and problem list.  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  CV: Denies chest pain.  Resp: Denies SOB.  GI: Denies abdominal pain, nausea and vomiting.  : Denies symptoms other than stated above.  Musculo: Denies back pain.    Objective   /80 (BP Location: Right arm, Patient Position: Sitting, Cuff Size: Standard)   Temp (!) 97.4 °F (36.3 °C)   Resp 20   Ht 5' 8\" (1.727 m)   Wt 93.1 kg (205 lb 3.2 oz)   BMI 31.20 kg/m²     Physical Exam  Const: Appears healthy and well developed. No signs of acute distress present.  Resp: Respirations are regular and unlabored.   CV: Rate is regular. Rhythm is regular.  Abdomen: Abdomen is soft, nontender, and nondistended. Kidneys are not palpable.  : Penis testicles epididymis normal no hernias.  Prostate 1+ benign.  No SP tenderness.  Psych: Patient's attitude is cooperative. Mood is normal. Affect is normal.    Current Medications     Current Outpatient Medications:     Cholecalciferol (Vitamin D3) 10 MCG (400 UNIT) CHEW, Chew 2 tablets daily, Disp: , Rfl:     divalproex sodium (DEPAKOTE) 500 mg EC tablet, Take 500 mg by mouth One in the AM and two in the PM, Disp: , Rfl:     docusate sodium (COLACE) 100 mg capsule, Take 100 mg by mouth 2 (two) times a day, Disp: , Rfl:     fenofibrate (TRICOR) 48 mg tablet, Take 1 tablet (48 mg total) by mouth daily, Disp: 90 tablet, Rfl: 3    fluvoxaMINE (LUVOX) 100 mg tablet, Take 100 mg by mouth daily at bedtime  , Disp: , Rfl:     levothyroxine 125 mcg tablet, , Disp: , Rfl:     losartan (COZAAR) 50 mg tablet, Take 1 tablet (50 mg total) by mouth daily, Disp: 90 tablet, Rfl: 3    Melatonin 10 MG TABS, Take 10 mg by mouth " daily, Disp: , Rfl:     Memantine HCl ER 28 MG CP24, , Disp: , Rfl:     mirtazapine (REMERON SOL-TAB) 45 mg dispersible tablet, Take 45 mg by mouth daily at bedtime, Disp: , Rfl:     omeprazole (PriLOSEC OTC) 20 MG tablet, Take 20 mg by mouth daily, Disp: , Rfl:     pravastatin (PRAVACHOL) 80 mg tablet, Take 80 mg by mouth daily , Disp: , Rfl:     pregabalin (LYRICA) 150 mg capsule, , Disp: , Rfl:     tamsulosin (FLOMAX) 0.4 mg, Take 0.4 mg by mouth daily with dinner , Disp: , Rfl:     topiramate (TOPAMAX) 100 mg tablet, Take 100 mg by mouth 2 (two) times a day , Disp: , Rfl:     vitamin B-12 (CYANOCOBALAMIN) 100 MCG TABS, Take 1 tablet by mouth daily, Disp: , Rfl:         Frank D'Amico, MD

## 2024-05-31 ENCOUNTER — NURSE TRIAGE (OUTPATIENT)
Age: 64
End: 2024-05-31

## 2024-05-31 ENCOUNTER — TELEPHONE (OUTPATIENT)
Dept: CARDIOLOGY CLINIC | Facility: CLINIC | Age: 64
End: 2024-05-31

## 2024-05-31 NOTE — TELEPHONE ENCOUNTER
"Received call from pt's wife regarding pt's high blood pressure readings since 5/28. Please see readings below. Pt's wife stated along with this readings, pt has been having constant chest pain every am that subsides throughout the day, headaches that occur in the afternoon, arm weakness that happens every am, and sob with exertion. Due to these s/s, it was advised for pt to be evaluated in the ED. Pt's wife voiced understanding and stated she will take him to the ED in [place]. Pt placed on tracking board.      Reason for Disposition   Pain also in shoulder(s) or arm(s) or jaw    Answer Assessment - Initial Assessment Questions  1. BLOOD PRESSURE: \"What is the blood pressure?\" \"Did you take at least two measurements 5 minutes apart?\"      5/28: 8am (162/104); 12pm (153/101)      5/29: 8am (157/100); 12pm (170/100)      5/30: 8am (163/92); 12pm (165/99)      5/31: 8am (156/89)       3. HOW: \"How did you obtain the blood pressure?\" (e.g., visiting nurse, automatic home BP monitor)      Automatic BP  4. HISTORY: \"Do you have a history of high blood pressure?\"      Denies - usually 130s/140s systolic and once in awhile will go up to 160s/170s systolic. Diastolic has been in the 90s  5. MEDICATIONS: \"Are you taking any medications for blood pressure?\" \"Have you missed any doses recently?\"      denies  6. OTHER SYMPTOMS: \"Do you have any symptoms?\" (e.g., headache, chest pain, blurred vision, difficulty breathing, weakness)      Chest pain in the am, headache later in the day, arm weakness    Answer Assessment - Initial Assessment Questions  1. LOCATION: \"Where does it hurt?\"        To the left side of his chest   2. RADIATION: \"Does the pain go anywhere else?\" (e.g., into neck, jaw, arms, back)      Pt's wife denies  3. ONSET: \"When did the chest pain begin?\" (Minutes, hours or days)       Couple weeks ago - beginning/middle of May   4. PATTERN \"Does the pain come and go, or has it been constant since it started?\"  \"Does it " "get worse with exertion?\"       Constant in the am   5. DURATION: \"How long does it last\" (e.g., seconds, minutes, hours)      Subsides but does not fully go away   6. SEVERITY: \"How bad is the pain?\"  (e.g., Scale 1-10; mild, moderate, or severe)     - MILD (1-3): doesn't interfere with normal activities      - MODERATE (4-7): interferes with normal activities or awakens from sleep     - SEVERE (8-10): excruciating pain, unable to do any normal activities        5 in th am, goes down to a 2  7. CARDIAC RISK FACTORS: \"Do you have any history of heart problems or risk factors for heart disease?\" (e.g., angina, prior heart attack; diabetes, high blood pressure, high cholesterol, smoker, or strong family history of heart disease)      High cholesterol   8. PULMONARY RISK FACTORS: \"Do you have any history of lung disease?\"  (e.g., blood clots in lung, asthma, emphysema, birth control pills)      denies  10. OTHER SYMPTOMS: \"Do you have any other symptoms?\" (e.g., dizziness, nausea, vomiting, sweating, fever, difficulty breathing, cough)        Sob with exertion, denies at rest    Protocols used: Blood Pressure - High-ADULT-OH, Chest Pain-ADULT-OH    "

## 2024-05-31 NOTE — TELEPHONE ENCOUNTER
Pt's wife LM stating that Anjum's BP has been running high since Tuesday.  No BP readings were left on the message.  Please call [phone]

## 2024-06-01 ENCOUNTER — APPOINTMENT (EMERGENCY)
Dept: RADIOLOGY | Facility: HOSPITAL | Age: 64
End: 2024-06-01
Payer: MEDICARE

## 2024-06-01 ENCOUNTER — APPOINTMENT (EMERGENCY)
Dept: CT IMAGING | Facility: HOSPITAL | Age: 64
End: 2024-06-01
Payer: MEDICARE

## 2024-06-01 ENCOUNTER — HOSPITAL ENCOUNTER (EMERGENCY)
Facility: HOSPITAL | Age: 64
Discharge: HOME | End: 2024-06-01
Attending: EMERGENCY MEDICINE
Payer: MEDICARE

## 2024-06-01 VITALS
WEIGHT: 191.58 LBS | HEART RATE: 57 BPM | RESPIRATION RATE: 16 BRPM | OXYGEN SATURATION: 97 % | SYSTOLIC BLOOD PRESSURE: 161 MMHG | DIASTOLIC BLOOD PRESSURE: 83 MMHG | BODY MASS INDEX: 29.04 KG/M2 | TEMPERATURE: 97.6 F | HEIGHT: 68 IN

## 2024-06-01 DIAGNOSIS — R07.89 ATYPICAL CHEST PAIN: Primary | ICD-10-CM

## 2024-06-01 DIAGNOSIS — I16.9 HYPERTENSIVE CRISIS: ICD-10-CM

## 2024-06-01 LAB
2HR DELTA HS TROPONIN: <-1 NG/L
ANION GAP SERPL CALCULATED.3IONS-SCNC: 6 MMOL/L (ref 4–13)
BASOPHILS # BLD AUTO: 0.06 THOUSANDS/ÂΜL (ref 0–0.1)
BASOPHILS NFR BLD AUTO: 1 % (ref 0–1)
BUN SERPL-MCNC: 16 MG/DL (ref 5–25)
CALCIUM SERPL-MCNC: 8.8 MG/DL (ref 8.4–10.2)
CARDIAC TROPONIN I PNL SERPL HS: 3 NG/L
CARDIAC TROPONIN I PNL SERPL HS: <2 NG/L
CHLORIDE SERPL-SCNC: 113 MMOL/L (ref 96–108)
CO2 SERPL-SCNC: 26 MMOL/L (ref 21–32)
CREAT SERPL-MCNC: 1.28 MG/DL (ref 0.6–1.3)
EOSINOPHIL # BLD AUTO: 0.11 THOUSAND/ÂΜL (ref 0–0.61)
EOSINOPHIL NFR BLD AUTO: 2 % (ref 0–6)
ERYTHROCYTE [DISTWIDTH] IN BLOOD BY AUTOMATED COUNT: 13.4 % (ref 11.6–15.1)
GFR SERPL CREATININE-BSD FRML MDRD: 59 ML/MIN/1.73SQ M
GLUCOSE SERPL-MCNC: 111 MG/DL (ref 65–140)
HCT VFR BLD AUTO: 43.1 % (ref 36.5–49.3)
HGB BLD-MCNC: 13.6 G/DL (ref 12–17)
IMM GRANULOCYTES # BLD AUTO: 0.06 THOUSAND/UL (ref 0–0.2)
IMM GRANULOCYTES NFR BLD AUTO: 1 % (ref 0–2)
LYMPHOCYTES # BLD AUTO: 1.19 THOUSANDS/ÂΜL (ref 0.6–4.47)
LYMPHOCYTES NFR BLD AUTO: 27 % (ref 14–44)
MCH RBC QN AUTO: 30 PG (ref 26.8–34.3)
MCHC RBC AUTO-ENTMCNC: 31.6 G/DL (ref 31.4–37.4)
MCV RBC AUTO: 95 FL (ref 82–98)
MONOCYTES # BLD AUTO: 0.35 THOUSAND/ÂΜL (ref 0.17–1.22)
MONOCYTES NFR BLD AUTO: 8 % (ref 4–12)
NEUTROPHILS # BLD AUTO: 2.72 THOUSANDS/ÂΜL (ref 1.85–7.62)
NEUTS SEG NFR BLD AUTO: 61 % (ref 43–75)
NRBC BLD AUTO-RTO: 0 /100 WBCS
PLATELET # BLD AUTO: 173 THOUSANDS/UL (ref 149–390)
PMV BLD AUTO: 10.3 FL (ref 8.9–12.7)
POTASSIUM SERPL-SCNC: 3.9 MMOL/L (ref 3.5–5.3)
RBC # BLD AUTO: 4.53 MILLION/UL (ref 3.88–5.62)
SODIUM SERPL-SCNC: 145 MMOL/L (ref 135–147)
WBC # BLD AUTO: 4.49 THOUSAND/UL (ref 4.31–10.16)

## 2024-06-01 PROCEDURE — 80048 BASIC METABOLIC PNL TOTAL CA: CPT | Performed by: EMERGENCY MEDICINE

## 2024-06-01 PROCEDURE — 96376 TX/PRO/DX INJ SAME DRUG ADON: CPT

## 2024-06-01 PROCEDURE — 96374 THER/PROPH/DIAG INJ IV PUSH: CPT

## 2024-06-01 PROCEDURE — 74018 RADEX ABDOMEN 1 VIEW: CPT

## 2024-06-01 PROCEDURE — 84484 ASSAY OF TROPONIN QUANT: CPT | Performed by: EMERGENCY MEDICINE

## 2024-06-01 PROCEDURE — 99285 EMERGENCY DEPT VISIT HI MDM: CPT

## 2024-06-01 PROCEDURE — 70450 CT HEAD/BRAIN W/O DYE: CPT

## 2024-06-01 PROCEDURE — 36415 COLL VENOUS BLD VENIPUNCTURE: CPT | Performed by: EMERGENCY MEDICINE

## 2024-06-01 PROCEDURE — 99291 CRITICAL CARE FIRST HOUR: CPT | Performed by: EMERGENCY MEDICINE

## 2024-06-01 PROCEDURE — 93005 ELECTROCARDIOGRAM TRACING: CPT

## 2024-06-01 PROCEDURE — 85025 COMPLETE CBC W/AUTO DIFF WBC: CPT | Performed by: EMERGENCY MEDICINE

## 2024-06-01 PROCEDURE — 71046 X-RAY EXAM CHEST 2 VIEWS: CPT

## 2024-06-01 PROCEDURE — 70250 X-RAY EXAM OF SKULL: CPT

## 2024-06-01 RX ORDER — HYDRALAZINE HYDROCHLORIDE 20 MG/ML
10 INJECTION INTRAMUSCULAR; INTRAVENOUS ONCE
Status: COMPLETED | OUTPATIENT
Start: 2024-06-01 | End: 2024-06-01

## 2024-06-01 RX ORDER — CLONIDINE HYDROCHLORIDE 0.1 MG/1
0.1 TABLET ORAL ONCE
Status: COMPLETED | OUTPATIENT
Start: 2024-06-01 | End: 2024-06-01

## 2024-06-01 RX ORDER — ASPIRIN 81 MG/1
324 TABLET, CHEWABLE ORAL ONCE
Status: COMPLETED | OUTPATIENT
Start: 2024-06-01 | End: 2024-06-01

## 2024-06-01 RX ORDER — AMLODIPINE BESYLATE 5 MG/1
5 TABLET ORAL DAILY
Qty: 30 TABLET | Refills: 0 | Status: SHIPPED | OUTPATIENT
Start: 2024-06-01 | End: 2024-07-01

## 2024-06-01 RX ORDER — SODIUM CHLORIDE 9 MG/ML
3 INJECTION INTRAVENOUS
Status: DISCONTINUED | OUTPATIENT
Start: 2024-06-01 | End: 2024-06-01 | Stop reason: HOSPADM

## 2024-06-01 RX ADMIN — CLONIDINE HYDROCHLORIDE 0.1 MG: 0.1 TABLET ORAL at 14:30

## 2024-06-01 RX ADMIN — ASPIRIN 81 MG 324 MG: 81 TABLET ORAL at 11:17

## 2024-06-01 RX ADMIN — HYDRALAZINE HYDROCHLORIDE 10 MG: 20 INJECTION INTRAMUSCULAR; INTRAVENOUS at 11:18

## 2024-06-01 RX ADMIN — HYDRALAZINE HYDROCHLORIDE 10 MG: 20 INJECTION INTRAMUSCULAR; INTRAVENOUS at 12:02

## 2024-06-01 NOTE — ED PROVIDER NOTES
History  Chief Complaint   Patient presents with    Chest Pain     Pt reports chest pain radiating down left arm since middle of May- per family at bedside pt also having high blood pressures at home      This is a 63-year-old male presenting to the ED for evaluation of chest pain radiating on left arm since May.  Patient has been having left arm numbness and weakness as well.  He does not have a history of high blood pressure but states that he has been having unusually high blood pressure over the last 2 weeks.  He has unsteady gait at baseline however his wife states that has been worsened over the last 2 weeks as well.        Prior to Admission Medications   Prescriptions Last Dose Informant Patient Reported? Taking?   Cholecalciferol (Vitamin D3) 10 MCG (400 UNIT) CHEW   Yes No   Sig: Chew 2 tablets daily   Melatonin 10 MG TABS   Yes No   Sig: Take 10 mg by mouth daily   Memantine HCl ER 28 MG CP24   Yes No   divalproex sodium (DEPAKOTE) 500 mg EC tablet   Yes No   Sig: Take 500 mg by mouth One in the AM and two in the PM   docusate sodium (COLACE) 100 mg capsule   Yes No   Sig: Take 100 mg by mouth 2 (two) times a day   fenofibrate (TRICOR) 48 mg tablet   No No   Sig: Take 1 tablet (48 mg total) by mouth daily   fluvoxaMINE (LUVOX) 100 mg tablet   Yes No   Sig: Take 100 mg by mouth daily at bedtime     levothyroxine 125 mcg tablet   Yes No   losartan (COZAAR) 50 mg tablet   No No   Sig: Take 1 tablet (50 mg total) by mouth daily   mirtazapine (REMERON SOL-TAB) 45 mg dispersible tablet   Yes No   Sig: Take 45 mg by mouth daily at bedtime   omeprazole (PriLOSEC OTC) 20 MG tablet   Yes No   Sig: Take 20 mg by mouth daily   pravastatin (PRAVACHOL) 80 mg tablet   Yes No   Sig: Take 80 mg by mouth daily    pregabalin (LYRICA) 150 mg capsule   Yes No   tamsulosin (FLOMAX) 0.4 mg   Yes No   Sig: Take 0.4 mg by mouth daily with dinner    tamsulosin (FLOMAX) 0.4 mg   No No   Sig: Take 1 capsule (0.4 mg total) by mouth 2  (two) times a day   topiramate (TOPAMAX) 100 mg tablet   Yes No   Sig: Take 100 mg by mouth 2 (two) times a day    vitamin B-12 (CYANOCOBALAMIN) 100 MCG TABS   Yes No   Sig: Take 1 tablet by mouth daily      Facility-Administered Medications: None       Past Medical History:   Diagnosis Date    Abnormal EKG     Aneurysm of anterior cerebral artery     Anxiety     Bipolar disorder (HCC)     BPH (benign prostatic hyperplasia)     Chronic migraine     CPAP (continuous positive airway pressure) dependence     Depression     Disease of thyroid gland     Hesitancy of micturition     Hydrocephalus (HCC)     Hyperlipidemia     Hypertension     Hypertriglyceridemia     Hypothyroidism     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     Neutropenia (HCC)     Primary thrombocytopenia (HCC)     Seizure (HCC)     Sleep apnea     Vitamin D deficiency        Past Surgical History:   Procedure Laterality Date    BRAIN SURGERY      CARDIAC CATHETERIZATION      CHOLECYSTECTOMY      PROSTATE SURGERY      streched the prostate    VENTRICULOPERITONEAL SHUNT         Family History   Problem Relation Age of Onset    Anuerysm Mother     Colon cancer Father     Hypertension Father     Heart disease Sister     Heart attack Sister     Breast cancer Sister     Stomach cancer Brother     Testicular cancer Brother     Skin cancer Brother     Diabetes Son     Thyroid disease Daughter     Cleft palate Daughter     Cleft lip Daughter      I have reviewed and agree with the history as documented.    E-Cigarette/Vaping    E-Cigarette Use Never User      E-Cigarette/Vaping Substances    Nicotine No     THC No     CBD No     Flavoring No      Social History     Tobacco Use    Smoking status: Former     Current packs/day: 0.00     Types: Cigarettes     Start date:      Quit date:      Years since quittin.4    Smokeless tobacco: Never   Vaping Use    Vaping status: Never Used   Substance Use Topics     Alcohol use: Yes     Comment: extremely rare    Drug use: Never       Review of Systems   Constitutional:  Negative for chills and fever.   HENT:  Negative for ear pain and sore throat.    Eyes:  Negative for pain and visual disturbance.   Respiratory:  Negative for cough and shortness of breath.    Cardiovascular:  Positive for chest pain. Negative for palpitations.   Gastrointestinal:  Negative for abdominal pain and vomiting.   Genitourinary:  Negative for dysuria and hematuria.   Musculoskeletal:  Positive for gait problem. Negative for arthralgias and back pain.   Skin:  Negative for color change and rash.   Neurological:  Positive for dizziness, light-headedness and numbness. Negative for seizures and syncope.   All other systems reviewed and are negative.      Physical Exam  Physical Exam  Vitals and nursing note reviewed.   Constitutional:       General: He is not in acute distress.     Appearance: He is well-developed and normal weight. He is ill-appearing.   HENT:      Head: Normocephalic and atraumatic.   Eyes:      Extraocular Movements: Extraocular movements intact.      Conjunctiva/sclera: Conjunctivae normal.   Cardiovascular:      Rate and Rhythm: Normal rate and regular rhythm.      Heart sounds: Normal heart sounds. No murmur heard.  Pulmonary:      Effort: Pulmonary effort is normal. No respiratory distress.      Breath sounds: Normal breath sounds.   Chest:      Chest wall: No mass, deformity, tenderness, crepitus or edema. There is no dullness to percussion.   Abdominal:      General: Bowel sounds are normal. There is no abdominal bruit.      Palpations: Abdomen is soft. There is no hepatomegaly or mass.      Tenderness: There is no abdominal tenderness. There is no guarding.   Musculoskeletal:         General: No swelling.      Cervical back: Neck supple.      Right lower leg: No edema.      Left lower leg: No edema.   Skin:     General: Skin is warm and dry.      Capillary Refill: Capillary  refill takes less than 2 seconds.   Neurological:      General: No focal deficit present.      Mental Status: He is alert and oriented to person, place, and time.   Psychiatric:         Mood and Affect: Mood normal.         Vital Signs  ED Triage Vitals   Temperature Pulse Respirations Blood Pressure SpO2   06/01/24 1057 06/01/24 1057 06/01/24 1057 06/01/24 1057 06/01/24 1057   97.6 °F (36.4 °C) 61 18 (!) 206/98 97 %      Temp Source Heart Rate Source Patient Position - Orthostatic VS BP Location FiO2 (%)   06/01/24 1057 06/01/24 1057 06/01/24 1057 06/01/24 1057 --   Temporal Monitor Sitting Left arm       Pain Score       06/01/24 1145       No Pain           Vitals:    06/01/24 1245 06/01/24 1315 06/01/24 1345 06/01/24 1430   BP: 146/76 156/79 151/81 161/83   Pulse: 64 59 57    Patient Position - Orthostatic VS: Sitting            Visual Acuity      ED Medications  Medications   sodium chloride (PF) 0.9 % injection 3 mL (has no administration in time range)   hydrALAZINE (APRESOLINE) injection 10 mg (10 mg Intravenous Given 6/1/24 1118)   aspirin chewable tablet 324 mg (324 mg Oral Given 6/1/24 1117)   hydrALAZINE (APRESOLINE) injection 10 mg (10 mg Intravenous Given 6/1/24 1202)   cloNIDine (CATAPRES) tablet 0.1 mg (0.1 mg Oral Given 6/1/24 1430)       Diagnostic Studies  Results Reviewed       Procedure Component Value Units Date/Time    HS Troponin I 2hr [[id number]]  (Normal) Collected: 06/01/24 1309    Lab Status: Final result Specimen: Blood from Arm, Left Updated: 06/01/24 1336     hs TnI 2hr <2 ng/L      Delta 2hr hsTnI <-1 ng/L     HS Troponin I 4hr [[id number]]     Lab Status: No result Specimen: Blood     HS Troponin 0hr (reflex protocol) [[id number]]  (Normal) Collected: 06/01/24 1114    Lab Status: Final result Specimen: Blood from Arm, Left Updated: 06/01/24 1146     hs TnI 0hr 3 ng/L     Basic metabolic panel [[id number]]  (Abnormal) Collected: 06/01/24 1114    Lab Status: Final result Specimen: Blood  from Arm, Left Updated: 06/01/24 1142     Sodium 145 mmol/L      Potassium 3.9 mmol/L      Chloride 113 mmol/L      CO2 26 mmol/L      ANION GAP 6 mmol/L      BUN 16 mg/dL      Creatinine 1.28 mg/dL      Glucose 111 mg/dL      Calcium 8.8 mg/dL      eGFR 59 ml/min/1.73sq m     Narrative:      [employer] Kid[employer] guidelines for Chronic Kidney Disease (CKD):     Stage 1 with normal or high GFR (GFR > 90 mL/min/1.73 square meters)    Stage 2 Mild CKD (GFR = 60-89 mL/min/1.73 square meters)    Stage 3A Moderate CKD (GFR = 45-59 mL/min/1.73 square meters)    Stage 3B Moderate CKD (GFR = 30-44 mL/min/1.73 square meters)    Stage 4 Severe CKD (GFR = 15-29 mL/min/1.73 square meters)    Stage 5 End Stage CKD (GFR <15 mL/min/1.73 square meters)  Note: GFR calculation is accurate only with a steady state creatinine    CBC and differential [[id number]] Collected: 06/01/24 1114    Lab Status: Final result Specimen: Blood from Arm, Left Updated: 06/01/24 1122     WBC 4.49 Thousand/uL      RBC 4.53 Million/uL      Hemoglobin 13.6 g/dL      Hematocrit 43.1 %      MCV 95 fL      MCH 30.0 pg      MCHC 31.6 g/dL      RDW 13.4 %      MPV 10.3 fL      Platelets 173 Thousands/uL      nRBC 0 /100 WBCs      Segmented % 61 %      Immature Grans % 1 %      Lymphocytes % 27 %      Monocytes % 8 %      Eosinophils Relative 2 %      Basophils Relative 1 %      Absolute Neutrophils 2.72 Thousands/µL      Absolute Immature Grans 0.06 Thousand/uL      Absolute Lymphocytes 1.19 Thousands/µL      Absolute Monocytes 0.35 Thousand/µL      Eosinophils Absolute 0.11 Thousand/µL      Basophils Absolute 0.06 Thousands/µL                    XR shunt series   Final Result by Timi Duran DO (06/01 1202)      Intact right-sided programmable shunt terminating within the pelvis.                  Workstation performed: [id number]         CT head without contrast   Final Result by Timi Duran DO (06/01 1152)      Stable  chronic changes within the brain parenchyma with no mass, hemorrhage or signs of acute territorial infarction.                  Workstation performed: [id number]                    Procedures  ECG 12 Lead Documentation Only    Date/Time: 6/1/2024 12:48 PM    Performed by: Alona Magaña DO  Authorized by: Alona Magaña DO    ECG reviewed by me, the ED Provider: yes    Previous ECG:     Previous ECG:  Unavailable  Interpretation:     Interpretation: normal    Rate:     ECG rate:  61    ECG rate assessment: normal    Rhythm:     Rhythm: sinus rhythm    Ectopy:     Ectopy: none    QRS:     QRS axis:  Normal    QRS intervals:  Normal  Conduction:     Conduction: normal    ST segments:     ST segments:  Normal  CriticalCare Time    Date/Time: 6/1/2024 3:03 PM    Performed by: Alona Magaña DO  Authorized by: Alona Magaña DO    Critical care provider statement:     Critical care time (minutes):  35    Critical care time was exclusive of:  Separately billable procedures and treating other patients    Critical care was necessary to treat or prevent imminent or life-threatening deterioration of the following conditions:  Circulatory failure    Critical care was time spent personally by me on the following activities:  Discussions with consultants, evaluation of patient's response to treatment, obtaining history from patient or surrogate, examination of patient, ordering and performing treatments and interventions, ordering and review of radiographic studies, ordering and review of laboratory studies and re-evaluation of patient's condition           ED Course  ED Course as of 06/01/24 1505   Sat Jun 01, 2024   1502 Patient has stable ED course with no acute findings on the workup.  His blood pressure improved with antihypertensive and he was sent home with a prescription for the same with outpatient referral for cardiology             HEART Risk Score      Flowsheet Row Most Recent  Value   Heart Score Risk Calculator    History 1 Filed at: 06/01/2024 1403   ECG 0 Filed at: 06/01/2024 1403   Age 1 Filed at: 06/01/2024 1403   Risk Factors 2 Filed at: 06/01/2024 1403   Troponin 0 Filed at: 06/01/2024 1403   HEART Score 4 Filed at: 06/01/2024 1403                          SBIRT 20yo+      Flowsheet Row Most Recent Value   Initial Alcohol Screen: US AUDIT-C     1. How often do you have a drink containing alcohol? 0 Filed at: 06/01/2024 1056   2. How many drinks containing alcohol do you have on a typical day you are drinking?  0 Filed at: 06/01/2024 1056   3a. Male UNDER 65: How often do you have five or more drinks on one occasion? 0 Filed at: 06/01/2024 1056   Audit-C Score 0 Filed at: 06/01/2024 1056   NOY: How many times in the past year have you...    Used an illegal drug or used a prescription medication for non-medical reasons? Never Filed at: 06/01/2024 1056                      Medical Decision Making  Amount and/or Complexity of Data Reviewed  Labs: ordered.  Radiology: ordered.    Risk  OTC drugs.  Prescription drug management.             Disposition  Final diagnoses:   Atypical chest pain   Hypertensive crisis     Time reflects when diagnosis was documented in both MDM as applicable and the Disposition within this note       Time User Action Codes Description Comment    6/1/2024  2:07 PM Alona Magaña Add [R07.89] Atypical chest pain     6/1/2024  2:08 PM Alona Magaña Add [I16.9] Hypertensive crisis           ED Disposition       ED Disposition   Discharge    Condition   Stable    Date/Time   Sat Jun 1, 2024 1403    Comment   Anjum Santos discharge to home/self care.                   Follow-up Information       Follow up With Specialties Details Why Contact Info    Elise Cadena MD Internal Medicine   [address] [place] [id number]  [phone]              Discharge Medication List as of 6/1/2024  2:17 PM        START taking these medications    Details    amLODIPine (NORVASC) 5 mg tablet Take 1 tablet (5 mg total) by mouth daily, Starting Sat 6/1/2024, Until Mon 7/1/2024, Normal           CONTINUE these medications which have NOT CHANGED    Details   Cholecalciferol (Vitamin D3) 10 MCG (400 UNIT) CHEW Chew 2 tablets daily, Historical Med      divalproex sodium (DEPAKOTE) 500 mg EC tablet Take 500 mg by mouth One in the AM and two in the PM, Historical Med      docusate sodium (COLACE) 100 mg capsule Take 100 mg by mouth 2 (two) times a day, Historical Med      fenofibrate (TRICOR) 48 mg tablet Take 1 tablet (48 mg total) by mouth daily, Starting Fri 4/19/2024, Normal      fluvoxaMINE (LUVOX) 100 mg tablet Take 100 mg by mouth daily at bedtime  , Starting Mon 3/21/2022, Historical Med      levothyroxine 125 mcg tablet Starting Wed 4/19/2023, Historical Med      losartan (COZAAR) 50 mg tablet Take 1 tablet (50 mg total) by mouth daily, Starting Fri 4/19/2024, Normal      Melatonin 10 MG TABS Take 10 mg by mouth daily, Historical Med      Memantine HCl ER 28 MG CP24 Starting Wed 3/22/2023, Historical Med      mirtazapine (REMERON SOL-TAB) 45 mg dispersible tablet Take 45 mg by mouth daily at bedtime, Starting Mon 9/27/2021, Historical Med      omeprazole (PriLOSEC OTC) 20 MG tablet Take 20 mg by mouth daily, Historical Med      pravastatin (PRAVACHOL) 80 mg tablet Take 80 mg by mouth daily , Starting Tue 7/6/2021, Historical Med      pregabalin (LYRICA) 150 mg capsule Historical Med      !! tamsulosin (FLOMAX) 0.4 mg Take 0.4 mg by mouth daily with dinner , Historical Med      !! tamsulosin (FLOMAX) 0.4 mg Take 1 capsule (0.4 mg total) by mouth 2 (two) times a day, Starting Wed 4/24/2024, Normal      topiramate (TOPAMAX) 100 mg tablet Take 100 mg by mouth 2 (two) times a day , Historical Med      vitamin B-12 (CYANOCOBALAMIN) 100 MCG TABS Take 1 tablet by mouth daily, Starting Fri 7/14/2023, Historical Med       !! - Potential duplicate medications found. Please  discuss with provider.              PDMP Review       None            ED Provider  Electronically Signed by             Alona Magaña DO  06/01/24 7993

## 2024-06-01 NOTE — DISCHARGE INSTRUCTIONS
Return to the ER immediately for any worsening symptoms.  Please follow-up with your PCP as well as cardiology or further evaluation management.

## 2024-06-02 LAB
ATRIAL RATE: 61 BPM
ATRIAL RATE: 63 BPM
P AXIS: 33 DEGREES
P AXIS: 54 DEGREES
PR INTERVAL: 142 MS
PR INTERVAL: 146 MS
QRS AXIS: -29 DEGREES
QRS AXIS: -53 DEGREES
QRSD INTERVAL: 90 MS
QRSD INTERVAL: 90 MS
QT INTERVAL: 430 MS
QT INTERVAL: 440 MS
QTC INTERVAL: 432 MS
QTC INTERVAL: 450 MS
T WAVE AXIS: 19 DEGREES
T WAVE AXIS: 8 DEGREES
VENTRICULAR RATE: 61 BPM
VENTRICULAR RATE: 63 BPM

## 2024-06-02 PROCEDURE — 93010 ELECTROCARDIOGRAM REPORT: CPT | Performed by: INTERNAL MEDICINE

## 2024-06-04 NOTE — TELEPHONE ENCOUNTER
I am glad he is doing better. OK to continue amlodipine.  I reviewed the ER records. Labs and EKG were WNL.  Please have his wife continue to monitor BP. Notify us if < 100/60 or > 140/90.  Thank you!

## 2024-06-04 NOTE — TELEPHONE ENCOUNTER
Wife states that she did take him to the ED Saturday because his BP was 165/98. When they got to the ED it was 204/98. They started him on Amlodipine 5 mg. Sunday his BP was 133/84 and yesterday it was 149/82. He is doing much better per his wife.

## 2024-06-12 ENCOUNTER — TELEPHONE (OUTPATIENT)
Age: 64
End: 2024-06-12

## 2024-06-12 DIAGNOSIS — I10 PRIMARY HYPERTENSION: ICD-10-CM

## 2024-06-12 RX ORDER — LOSARTAN POTASSIUM 100 MG/1
100 TABLET ORAL DAILY
Qty: 30 TABLET | Refills: 11 | Status: SHIPPED | OUTPATIENT
Start: 2024-06-12

## 2024-06-12 NOTE — TELEPHONE ENCOUNTER
Elena from Dr. Cadena's office calls after pt was there today for a visit.  BP has been trending 140/80's, 150/80's, and 144/80's at home.  BP in their office today was 134/80 HR 59.  Pt asymptomatic.    Dr. Cadena wanted to relay to Lisette LONG of this in case she wants to increase pt's Losartan 50 mg daily, or newly started Amlodipine 5 mg daily.  Pt was in the ED 6/1 for chest pain and elevated BP, Amlodipine 5 mg was prescribed upon discharge.    Please advise.

## 2024-06-14 NOTE — TELEPHONE ENCOUNTER
"Spoke with patient's wife Analy, returning a call from yesterday. Relayed Lisette Devlin's message:    \"Please have them increase losartan to 100 mg and update our office in 1 week with BP readings. I adjusted the losartan dose at his pharmacy.\"    Verbalized understanding.  "

## 2024-06-21 ENCOUNTER — PROCEDURE VISIT (OUTPATIENT)
Dept: UROLOGY | Facility: CLINIC | Age: 64
End: 2024-06-21
Payer: MEDICARE

## 2024-06-21 VITALS
TEMPERATURE: 98.2 F | OXYGEN SATURATION: 98 % | HEART RATE: 72 BPM | SYSTOLIC BLOOD PRESSURE: 148 MMHG | DIASTOLIC BLOOD PRESSURE: 82 MMHG | RESPIRATION RATE: 16 BRPM | HEIGHT: 68 IN | BODY MASS INDEX: 31.28 KG/M2 | WEIGHT: 206.4 LBS

## 2024-06-21 DIAGNOSIS — R35.1 BENIGN PROSTATIC HYPERPLASIA WITH NOCTURIA: Primary | ICD-10-CM

## 2024-06-21 DIAGNOSIS — N40.1 BENIGN PROSTATIC HYPERPLASIA WITH NOCTURIA: Primary | ICD-10-CM

## 2024-06-21 PROCEDURE — 52000 CYSTOURETHROSCOPY: CPT | Performed by: UROLOGY

## 2024-06-21 RX ORDER — FINASTERIDE 5 MG/1
5 TABLET, FILM COATED ORAL DAILY
Qty: 90 TABLET | Refills: 3 | Status: SHIPPED | OUTPATIENT
Start: 2024-06-21

## 2024-06-21 NOTE — PROGRESS NOTES
UROLOGY PROGRESS NOTE         NAME: Anjum Santos  AGE: 63 y.o. SEX: male  : 1960   MRN: [id number]    DATE: 2024  TIME: 10:30 AM    Assessment and Plan      Impression:   1. Benign prostatic hyperplasia with nocturia  -     Cystoscopy    Worsening voiding symptoms likely related to regrowth of some of the prostate distally.  Very close to the sphincter.  Can consider resection but risk of incontinence discussed.  Options discussed.   Plan: We will stop the Flomax since it did no help.  Will start him on finasteride in hopes that this will help his voiding symptoms over the next 6 months to couple of years.  We can consider procedure but the risk of incontinence significant.  Follow-up 6 months on finasteride      Chief Complaint     Chief Complaint   Patient presents with    Cystoscopy     History of Present Illness     HPI: Anjum Santos is a 63 y.o. year old male who presents with history of BPH after TURP has symptoms that been slowly getting worse.  He has hesitancy.  He has nocturia once a night.  He voids every couple of hours during the day.  It takes him some time to void with a slow flow.  2 Flomax are not helping at all.    See separate cystoscopy note              The following portions of the patient's history were reviewed and updated as appropriate: allergies, current medications, past family history, past medical history, past social history, past surgical history and problem list.  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  CV: Denies chest pain.  Resp: Denies SOB.  GI: Denies abdominal pain, nausea and vomiting.  : Denies symptoms other than stated above.  Musculo: Denies back pain.    Objective   /82   Pulse 72   Temp 98.2 °F (36.8 °C) (Tympanic)   Resp 16   Ht 5' 8\" (1.727 m)   Wt 93.6 kg (206 lb 6.4 oz)   SpO2 98%   BMI 31.38 kg/m²     Physical Exam  Const: Appears healthy and well developed. No signs of acute distress present.  Resp: Respirations are regular and unlabored.   CV: Rate is regular. Rhythm is regular.  Abdomen: Abdomen is soft, nontender, and nondistended. Kidneys are not palpable.  : Penis normal.  Psych: Patient's attitude is cooperative. Mood is normal. Affect is normal.    Current Medications     Current Outpatient Medications:     amLODIPine (NORVASC) 5 mg tablet, Take 1 tablet (5 mg total) by mouth daily, Disp: 30 tablet, Rfl: 0    Cholecalciferol (Vitamin D3) 10 MCG (400 UNIT) CHEW, Chew 2 tablets daily, Disp: , Rfl:     divalproex sodium (DEPAKOTE) 500 mg EC tablet, Take 500 mg by mouth One in the AM and two in the PM, Disp: , Rfl:     docusate sodium (COLACE) 100 mg capsule, Take 100 mg by mouth 2 (two) times a day, Disp: , Rfl:     fenofibrate (TRICOR) 48 mg tablet, Take 1 tablet (48 mg total) by mouth daily, Disp: 90 tablet, Rfl: 3    fluvoxaMINE (LUVOX) 100 mg tablet, Take 100 mg by mouth 2 (two) times a day, Disp: , Rfl:     levothyroxine 125 mcg tablet, , Disp: , Rfl:     losartan (COZAAR) 100 MG tablet, Take 1 tablet (100 mg total) by mouth daily, Disp: 30 tablet, " Rfl: 11    Melatonin 10 MG TABS, Take 10 mg by mouth daily, Disp: , Rfl:     Memantine HCl ER 28 MG CP24, 28 mg in the morning, Disp: , Rfl:     mirtazapine (REMERON SOL-TAB) 45 mg dispersible tablet, Take 45 mg by mouth daily at bedtime, Disp: , Rfl:     omeprazole (PriLOSEC OTC) 20 MG tablet, Take 20 mg by mouth daily, Disp: , Rfl:     pravastatin (PRAVACHOL) 80 mg tablet, Take 80 mg by mouth daily , Disp: , Rfl:     pregabalin (LYRICA) 150 mg capsule, , Disp: , Rfl:     topiramate (TOPAMAX) 100 mg tablet, Take 100 mg by mouth 2 (two) times a day , Disp: , Rfl:     vitamin B-12 (CYANOCOBALAMIN) 100 MCG TABS, Take 1 tablet by mouth daily, Disp: , Rfl:         Frank D'Amico, MD

## 2024-06-21 NOTE — PROGRESS NOTES
"   Cystoscopy     Date/Time  6/21/2024 10:00 AM     Performed by  Frank D'Amico, MD   Authorized by  Frank D'Amico, MD     Universal Protocol:  Consent: Verbal consent obtained. Written consent obtained.  Risks and benefits: risks, benefits and alternatives were discussed  Consent given by: patient  Time out: Immediately prior to procedure a \"time out\" was called to verify the correct patient, procedure, equipment, support staff and site/side marked as required.  Timeout called at: 6/21/2024 10:28 AM.  Patient identity confirmed: verbally with patient      Procedure Details:  Procedure type: cystoscopy    Patient tolerance: Patient tolerated the procedure well with no immediate complications    Additional Procedure Details: Patient underwent flexible digital cystoscopy supine position.  Prepped with Betadine and lidocaine jelly.  Patient and his wife were able to visualize the findings.  Cystoscopy revealed a normal urethra.  There was some tissue anteriorly just proximal to the external sphincter that was obstructing the prostate was otherwise wide open.  His bladder was mildly trabeculated.  There were no tumors in the bladder.  No erythema in the bladder.  No stones in the bladder.  Orifices normal.  No bladder neck contracture.  The prostate tissue that was obstructing was very close to the external sphincter.  Might be difficult to resect without causing incontinence.  Discussed with patient.  1 dose of Keflex.      "

## 2024-06-28 ENCOUNTER — TELEPHONE (OUTPATIENT)
Dept: CARDIOLOGY CLINIC | Facility: CLINIC | Age: 64
End: 2024-06-28

## 2024-06-28 DIAGNOSIS — I16.9 HYPERTENSIVE CRISIS: ICD-10-CM

## 2024-06-28 RX ORDER — AMLODIPINE BESYLATE 10 MG/1
10 TABLET ORAL DAILY
Qty: 30 TABLET | Refills: 11 | Status: SHIPPED | OUTPATIENT
Start: 2024-06-28

## 2024-06-28 NOTE — TELEPHONE ENCOUNTER
Patient's wife Analy called into office with patient's blood pressure readings as follows:  6/18/24: 141/88, 6/19/24: 138/84,   6/20/24: 146/92, 6/21/24: 146/82  6/22/24: 133/80, 6/23/24: 149/92  6/24/24: 150/92, 6/25/24: 149/94  6/26/24: 150/85, 6/27/24: 134/81

## 2024-06-28 NOTE — TELEPHONE ENCOUNTER
Please let pt's wife know I ordered an increase dose of amlodipine to 10 mg daily. She can give him 2 of the 5 mg together to use up. Please call next week with BP readings.  Thank you

## 2024-07-05 NOTE — TELEPHONE ENCOUNTER
Spouse called and left BP readings    7/1 - 139/92  7/2 - 151/94  7/3 - 137/82  7/4 - 127/79  7/5 - 120/74

## 2024-07-05 NOTE — TELEPHONE ENCOUNTER
These are much better! OK to continue current regimen. Contact us if BP staying > 140/90.  Thank you

## 2024-08-09 ENCOUNTER — HOSPITAL ENCOUNTER (OUTPATIENT)
Dept: CT IMAGING | Facility: HOSPITAL | Age: 64
End: 2024-08-09
Payer: MEDICARE

## 2024-08-09 DIAGNOSIS — R10.9 UNSPECIFIED ABDOMINAL PAIN: ICD-10-CM

## 2024-08-09 PROCEDURE — 74177 CT ABD & PELVIS W/CONTRAST: CPT

## 2024-08-09 RX ADMIN — IOHEXOL 100 ML: 350 INJECTION, SOLUTION INTRAVENOUS at 07:31

## 2024-11-15 ENCOUNTER — OFFICE VISIT (OUTPATIENT)
Dept: CARDIOLOGY CLINIC | Facility: CLINIC | Age: 64
End: 2024-11-15
Payer: MEDICARE

## 2024-11-15 VITALS
OXYGEN SATURATION: 98 % | HEIGHT: 68 IN | SYSTOLIC BLOOD PRESSURE: 116 MMHG | WEIGHT: 207 LBS | DIASTOLIC BLOOD PRESSURE: 80 MMHG | TEMPERATURE: 97.4 F | HEART RATE: 78 BPM | BODY MASS INDEX: 31.37 KG/M2

## 2024-11-15 DIAGNOSIS — R07.9 CHEST PAIN, UNSPECIFIED TYPE: ICD-10-CM

## 2024-11-15 DIAGNOSIS — G47.33 OBSTRUCTIVE SLEEP APNEA: ICD-10-CM

## 2024-11-15 DIAGNOSIS — E78.2 MIXED HYPERLIPIDEMIA: ICD-10-CM

## 2024-11-15 DIAGNOSIS — N18.31 STAGE 3A CHRONIC KIDNEY DISEASE (HCC): ICD-10-CM

## 2024-11-15 DIAGNOSIS — R06.09 DYSPNEA ON EXERTION: ICD-10-CM

## 2024-11-15 DIAGNOSIS — R60.0 LOCALIZED EDEMA: ICD-10-CM

## 2024-11-15 DIAGNOSIS — I10 PRIMARY HYPERTENSION: Primary | ICD-10-CM

## 2024-11-15 PROCEDURE — 99214 OFFICE O/P EST MOD 30 MIN: CPT | Performed by: NURSE PRACTITIONER

## 2024-11-15 NOTE — PROGRESS NOTES
Cardiology Follow Up    Anjum Santos  1960  [id number]  [facility] [place] [address]  [address]  [place] [address] [phone]    Anjum presents today for routine follow up for chest pain, palpitations, hypertension and hyperlipidemia.     1. Primary hypertension  Assessment & Plan:  Patient with history of labile blood pressure.  BP is excellent on my check today.  HCTZ discontinued due to hypokalemia, elevated creatinine, and lightheadedness.  Continue losartan 100 mg daily, amlodipine 10 mg daily.  Continue 2 g sodium diet and regular moderate activity as tolerated.  Will continue to monitor.   Orders:  -     Ambulatory Referral to Cardiology  2. Mixed hyperlipidemia  Assessment & Plan:  Lipid panel 9/2/2022: C 157. T 141. H 41. L 88.   Pravastatin 80mg daily.  Fenofibrate 48mg daily.  Has orders for PCP for follow up labs.  3. Dyspnea on exertion  Assessment & Plan:  Check updated echo  Consider pulmonary work up given smoking history.  Orders:  -     Echo complete w/ contrast if indicated; Future; Expected date: 11/15/2024  4. Localized edema  Assessment & Plan:  History of lower leg edema.   Echocardiogram 11/16/2021 was unremarkable with normal LVEF and normal diastolic function without any significant valvular abnormalities.   Previously on HCTZ which has been discontinued due to hypokalemia, elevated creatine.  Recommend compression socks and leg elevation when sitting for extended periods of time.   Update echo  Report any new/worsening symptoms.  Orders:  -     Echo complete w/ contrast if indicated; Future; Expected date: 11/15/2024  5. Chest pain, unspecified type  Assessment & Plan:  History of atypical chest pain.  Currently episodes occur sporadically and last all day when present.  Difficult historian.  Lexiscan nuclear stress test performed 10/4/2021 was normal without evidence of ischemia.  Echocardiogram 11/16/2021 was  unremarkable.   Will obtain updated echo  Consider cardiac CTA - would use caution due to renal function  Continue with BP, lipid and glucose control.   Will monitor for recurrent symptoms.  6. Stage 3a chronic kidney disease (HCC)  Assessment & Plan:  Lab Results   Component Value Date    EGFR 61 08/08/2024    EGFR 63 08/01/2024    EGFR 59 06/01/2024    CREATININE 1.3 (H) 08/08/2024    CREATININE 1.3 (H) 08/01/2024    CREATININE 1.28 06/01/2024     Recent lab work with stable renal function. Avoiding nephrotoxic agents.  7. Obstructive sleep apnea  Assessment & Plan:  He reports compliance with CPAP.        SATISH Valero has a past medical history of hyperlipidemia, hypertension, seizure disorder, thyroid disease, BPH, BEREKET,cerebral aneurysm, IBS, congenital hydrocephalus with  shunt, neuropathy and anxiety.     Patient was evaluated previously by [facility] 10/27/2017 for lightheadedness, dizziness and palpitations. He did undergo cardiac catheterization in the 90's at [facility] in [place].      He presented to [facility] ER 7/28/2021 for evaluation of chest pain. He told his wife that he had left sided chest pain and he tried antacids but did not note any significant change in symptoms. The next day he woke up with chest pain in am and was seen in the ER and given 4 low dose aspirin and nitro and pain resolved. He also had noted some left arm numbness and shortness of breath. Blood pressure was high on presentation. He was noted to be bradycardiac in ER. Troponin x 2 was negative. Patient was discharged home.      He met in consultation with Dr. Aguilar on 9/22/2021. He reported a sharp, grabbing pain as well as left hand numbness and tingling recurring since his ER visit. Last chest pain episode was about 2-3 weeks ago. He was also experiencing left leg swelling and pain. Using CPAP consistently. Blood pressure had been labile recently. HR was slow at 51 bpm and his metoprolol succinate was discontinued.  His losartan was replaced with losartan/HCTZ 100/25 mg daily. Lab work was ordered. LLE u/s was ordered due to leg pain. Nuclear stress testing was also ordered.     Nuclear stress test was completed 10/4/2021 which revealed no evidence of scar or ischemia with gated EF 56%.  His lower extremity duplex was negative for DVT.     14 day ZIO monitor was ordered 1/2024 due to complaint of palpitations. This revealed 21 runs of SVT, longest 17.9 seconds with no other concerning findings (see full report below).    He followed up most recently 4/19/2024 where BP was elevated. His wife realized at the visit he had been out of his losartan. He restarted and BP improved.    11/15/2024: Anjmu presents for routine follow up. His wife assists with providing a history due to his cognitive dysfunction with poor recall. He denies any recent palpitations. He does experience atypical chest pain which he struggles to describe. His wife states the pain will last all day when it comes on. She notes exertional dyspnea. He has ankle swelling that comes and goes. No significant weight change. No orthopnea. No syncope/near syncope. BP remains stable on her home checks. He is taking and tolerating his medications. He will be meeting with his PCP next month and getting routine labs at that time.     Medical Problems       Problem List       Hypertension    Hyperlipidemia    Chest pain    Localized edema    Pain of left lower extremity    Obstructive sleep apnea    Benign prostatic hyperplasia with urinary obstruction    Bipolar depression ([facility])    Cerebral aneurysm, nonruptured    Congenital hydrocephalus (HCC)    Epileptic seizure (HCC)    Hypothyroidism    Memory loss    Diabetes mellitus without complication ([facility])    Stage 3 chronic kidney disease ([facility])    Vitamin B deficiency    Weight loss    Intermittent palpitations        Past Medical History:   Diagnosis Date    Abnormal EKG     Aneurysm of anterior cerebral artery     Anxiety      Bipolar disorder (HCC)     BPH (benign prostatic hyperplasia)     Chronic migraine     CPAP (continuous positive airway pressure) dependence     Depression     Disease of thyroid gland     Hesitancy of micturition     Hydrocephalus (HCC)     Hyperlipidemia     Hypertension     Hypertriglyceridemia     Hypothyroidism     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     Neutropenia (HCC)     Primary thrombocytopenia (HCC)     Seizure (HCC)     Sleep apnea     Vitamin D deficiency      Social History     Socioeconomic History    Marital status: /Civil Union     Spouse name: Not on file    Number of children: Not on file    Years of education: Not on file    Highest education level: Not on file   Occupational History    Not on file   Tobacco Use    Smoking status: Former     Current packs/day: 0.00     Types: Cigarettes     Start date:      Quit date:      Years since quittin.8    Smokeless tobacco: Never   Vaping Use    Vaping status: Never Used   Substance and Sexual Activity    Alcohol use: Not Currently     Comment: extremely rare    Drug use: Never    Sexual activity: Not on file     Comment: defer   Other Topics Concern    Not on file   Social History Narrative    Not on file     Social Drivers of Health     Financial Resource Strain: Low Risk  (2024)    Received from [facility] [facility]    Financial Resource Strain     Do you have any trouble paying for your medications, or do you think you might in the future?: No     Does your family have trouble paying for medicine? (Household - for ages 0-17 years): Not on file   Food Insecurity: No Food Insecurity (2024)    Received from [facility]    Hunger Vital Sign     Worried About Running Out of Food in the Last Year: Never true     Ran Out of Food in the Last Year: Never true   Transportation Needs: No Transportation Needs (2024)    Received from [facility]    Transportation Needs     Do you have trouble  getting a ride to medical visits or work? (Adult - for ages 18 years and over): Not on file     Does your family have a hard time getting a ride to doctors’ visits? (Household - for ages 0-17 years): Not on file     Has lack of transportation kept you from medical appointments, meetings, work, or from getting things needed for daily living? Check all that apply.: No     Do you (or your family) have trouble finding or paying for a ride (transportation)? (Household - for ages 0-17 years): Not on file   Physical Activity: Not on file   Stress: Not on file   Social Connections: Socially Integrated (8/1/2024)    Received from [employer]     How often do you feel lonely or isolated from those around you?: Never   Intimate Partner Violence: Not on file   Housing Stability: Low Risk  (8/1/2024)    Received from [facility]     Do you currently live in a shelter or have no steady place to sleep at night?: No     Do you think you are at risk of becoming homeless? (Adult - for ages 18 years and over): Not on file     Does your family worry about paying for your home or becoming homeless? (Household - for ages 0-17 years): Not on file     Are you homeless or worried that you might be in the future?: No     Are you (or your family) homeless or worried that you might be in the future? (Household - for ages 0-17 years): Not on file      Family History   Problem Relation Age of Onset    Anuerysm Mother     Colon cancer Father     Hypertension Father     Heart disease Sister     Heart attack Sister     Breast cancer Sister     Stomach cancer Brother     Testicular cancer Brother     Skin cancer Brother     Diabetes Son     Thyroid disease Daughter     Cleft palate Daughter     Cleft lip Daughter      Past Surgical History:   Procedure Laterality Date    BRAIN SURGERY      CARDIAC CATHETERIZATION      CHOLECYSTECTOMY      PROSTATE SURGERY  2014    streched the prostate    VENTRICULOPERITONEAL  "SHUNT         Current Outpatient Medications:     amLODIPine (NORVASC) 10 mg tablet, Take 1 tablet (10 mg total) by mouth daily, Disp: 30 tablet, Rfl: 11    Cholecalciferol (Vitamin D3) 10 MCG (400 UNIT) CHEW, Chew 2 tablets daily, Disp: , Rfl:     divalproex sodium (DEPAKOTE) 500 mg EC tablet, Take 500 mg by mouth One in the AM and two in the PM, Disp: , Rfl:     docusate sodium (COLACE) 100 mg capsule, Take 100 mg by mouth 2 (two) times a day, Disp: , Rfl:     fenofibrate (TRICOR) 48 mg tablet, Take 1 tablet (48 mg total) by mouth daily, Disp: 90 tablet, Rfl: 3    finasteride (PROSCAR) 5 mg tablet, Take 1 tablet (5 mg total) by mouth daily, Disp: 90 tablet, Rfl: 3    fluvoxaMINE (LUVOX) 100 mg tablet, Take 100 mg by mouth 2 (two) times a day, Disp: , Rfl:     levothyroxine 125 mcg tablet, , Disp: , Rfl:     losartan (COZAAR) 100 MG tablet, Take 1 tablet (100 mg total) by mouth daily, Disp: 30 tablet, Rfl: 11    Melatonin 10 MG TABS, Take 10 mg by mouth daily, Disp: , Rfl:     Memantine HCl ER 28 MG CP24, 28 mg in the morning, Disp: , Rfl:     mirtazapine (REMERON SOL-TAB) 45 mg dispersible tablet, Take 45 mg by mouth daily at bedtime, Disp: , Rfl:     omeprazole (PriLOSEC OTC) 20 MG tablet, Take 20 mg by mouth daily, Disp: , Rfl:     pravastatin (PRAVACHOL) 80 mg tablet, Take 80 mg by mouth daily , Disp: , Rfl:     pregabalin (LYRICA) 150 mg capsule, , Disp: , Rfl:     topiramate (TOPAMAX) 100 mg tablet, Take 100 mg by mouth 2 (two) times a day , Disp: , Rfl:     vitamin B-12 (CYANOCOBALAMIN) 100 MCG TABS, Take 1 tablet by mouth daily, Disp: , Rfl:   Allergies   Allergen Reactions    Ampicillin Other (See Comments)     \"low blood count\"    Bactrim [Sulfamethoxazole-Trimethoprim] Other (See Comments)     \"low blood count\"    Bupropion Hives    Ciprofloxacin Hives     ?fasiculations, nausea      Hydromorphone Hives and Itching    Nafcillin Other (See Comments)     WBC count down         Labs:     Chemistry      " "  Component Value Date/Time    K 3.7 08/08/2024 1119     (H) 08/08/2024 1119    CO2 22 08/08/2024 1119    BUN 13 08/08/2024 1119    CREATININE 1.3 (H) 08/08/2024 1119        Component Value Date/Time    CALCIUM 8.8 08/08/2024 1119    ALKPHOS 62 11/16/2023 1624    AST 18 11/16/2023 1624    ALT 21 11/16/2023 1624        ECG 6/1/2024: NSR. Inferior infarct.    ZIO 12/31-1/14/2024:  Predominantly sinus rhythm, HR , avg 63 bpm.  21 runs of SVT, longest 17.9 seconds with avg  bpm. No VT, pauses, high grade block, or a fib.  Rare PAC's and rare PVC's.  No triggers.     ECG 12/27/2023:   Sinus rhythm, HR 60 bpm, left axis deviation, possible prior inferior infarction, no significant chamber hypertrophy enlargement, no changes of ischemia.  QTc is borderline prolonged at 452 ms.      ECG 4/3/2023: Sinus bradycardia, otherwise normal ECG. Ventricular rate 57 bpm.     Lipid panel 9/2/2022: C 157. T 141. H 41. L 88.      Echocardiogram 11/16/2021:   EF 58%.   Normal diastolic function.   No valvular abnormalities.      Lexiscan nuclear stress test 10/4/2021:  No evidence of scar or ischemia.   EF 56%.      Lipid panel 5/14/2021: C 136. T 238. H 28. L 71.    Review of Systems   Constitutional: Negative.   HENT: Negative.     Cardiovascular:  Positive for chest pain, dyspnea on exertion and leg swelling. Negative for irregular heartbeat, near-syncope, orthopnea and palpitations.   Respiratory:  Positive for shortness of breath. Negative for cough and snoring.    Endocrine: Negative.    Skin: Negative.    Musculoskeletal:  Positive for back pain.   Gastrointestinal: Negative.    Genitourinary: Negative.    Neurological: Negative.    Psychiatric/Behavioral:  Positive for memory loss.        Vitals:    11/15/24 1354   BP: 116/80   Pulse:    Temp:    SpO2:      Vitals:    11/15/24 1332   Weight: 93.9 kg (207 lb)     Height: 5' 8\" (172.7 cm)   Body mass index is 31.47 kg/m².    Physical Exam  Vitals and nursing note " reviewed.   Constitutional:       General: He is not in acute distress.     Appearance: He is well-developed. He is not diaphoretic.   HENT:      Head: Normocephalic and atraumatic.   Neck:      Vascular: No carotid bruit or JVD.   Cardiovascular:      Rate and Rhythm: Normal rate and regular rhythm.      Pulses: Intact distal pulses.      Heart sounds: Normal heart sounds, S1 normal and S2 normal. No murmur heard.     No friction rub. No gallop.      Comments: Trivial ankle edema  Pulmonary:      Effort: Pulmonary effort is normal. No respiratory distress.      Breath sounds: Normal breath sounds.   Abdominal:      General: There is no distension.      Palpations: Abdomen is soft.      Tenderness: There is no abdominal tenderness.   Skin:     General: Skin is warm and dry.      Findings: No rash.   Neurological:      Mental Status: He is alert and oriented to person, place, and time.   Psychiatric:         Mood and Affect: Mood normal.         Speech: Speech normal.         Behavior: Behavior normal.         Cognition and Memory: Memory is impaired.

## 2024-11-15 NOTE — PATIENT INSTRUCTIONS
We will get an updated echocardiogram to recheck heart function and structure  Based on results it is not a bad idea to have the PCP get chest imaging and pulmonary function testing given smoking history.  Try compression socks   Move BP meds to morning

## 2024-11-17 PROBLEM — R06.09 DYSPNEA ON EXERTION: Status: ACTIVE | Noted: 2024-11-17

## 2024-11-17 NOTE — ASSESSMENT & PLAN NOTE
History of atypical chest pain.  Currently episodes occur sporadically and last all day when present.  Difficult historian.  Lexiscan nuclear stress test performed 10/4/2021 was normal without evidence of ischemia.  Echocardiogram 11/16/2021 was unremarkable.   Will obtain updated echo  Consider cardiac CTA - would use caution due to renal function  Continue with BP, lipid and glucose control.   Will monitor for recurrent symptoms.

## 2024-11-17 NOTE — ASSESSMENT & PLAN NOTE
History of lower leg edema.   Echocardiogram 11/16/2021 was unremarkable with normal LVEF and normal diastolic function without any significant valvular abnormalities.   Previously on HCTZ which has been discontinued due to hypokalemia, elevated creatine.  Recommend compression socks and leg elevation when sitting for extended periods of time.   Update echo  Report any new/worsening symptoms.

## 2024-11-17 NOTE — ASSESSMENT & PLAN NOTE
Patient with history of labile blood pressure.  BP is excellent on my check today.  HCTZ discontinued due to hypokalemia, elevated creatinine, and lightheadedness.  Continue losartan 100 mg daily, amlodipine 10 mg daily.  Continue 2 g sodium diet and regular moderate activity as tolerated.  Will continue to monitor.

## 2024-11-17 NOTE — ASSESSMENT & PLAN NOTE
Lab Results   Component Value Date    EGFR 61 08/08/2024    EGFR 63 08/01/2024    EGFR 59 06/01/2024    CREATININE 1.3 (H) 08/08/2024    CREATININE 1.3 (H) 08/01/2024    CREATININE 1.28 06/01/2024     Recent lab work with stable renal function. Avoiding nephrotoxic agents.

## 2024-11-22 ENCOUNTER — HOSPITAL ENCOUNTER (OUTPATIENT)
Dept: NON INVASIVE DIAGNOSTICS | Facility: HOSPITAL | Age: 64
Discharge: HOME | End: 2024-11-22
Payer: MEDICARE

## 2024-11-22 ENCOUNTER — RESULTS FOLLOW-UP (OUTPATIENT)
Dept: NON INVASIVE DIAGNOSTICS | Facility: HOSPITAL | Age: 64
End: 2024-11-22

## 2024-11-22 VITALS
SYSTOLIC BLOOD PRESSURE: 116 MMHG | DIASTOLIC BLOOD PRESSURE: 80 MMHG | WEIGHT: 207 LBS | HEART RATE: 65 BPM | HEIGHT: 68 IN | BODY MASS INDEX: 31.37 KG/M2

## 2024-11-22 DIAGNOSIS — R06.09 DYSPNEA ON EXERTION: ICD-10-CM

## 2024-11-22 DIAGNOSIS — R60.0 LOCALIZED EDEMA: ICD-10-CM

## 2024-11-22 LAB
AORTIC ROOT: 3.2 CM
AORTIC VALVE MEAN VELOCITY: 9.8 M/S
APICAL FOUR CHAMBER EJECTION FRACTION: 60 %
AV AREA BY CONTINUOUS VTI: 2.9 CM2
AV AREA PEAK VELOCITY: 2.9 CM2
AV LVOT MEAN GRADIENT: 4 MMHG
AV LVOT PEAK GRADIENT: 7 MMHG
AV MEAN GRADIENT: 4 MMHG
AV PEAK GRADIENT: 7 MMHG
AV VALVE AREA: 2.89 CM2
AV VELOCITY RATIO: 1.01
BSA FOR ECHO PROCEDURE: 2.07 M2
DOP CALC AO PEAK VEL: 1.32 M/S
DOP CALC AO VTI: 30.71 CM
DOP CALC LVOT AREA: 2.83 CM2
DOP CALC LVOT CARDIAC INDEX: 2.6 L/MIN/M2
DOP CALC LVOT CARDIAC OUTPUT: 5.39 L/MIN
DOP CALC LVOT DIAMETER: 1.9 CM
DOP CALC LVOT PEAK VEL VTI: 31.27 CM
DOP CALC LVOT PEAK VEL: 1.33 M/S
DOP CALC LVOT STROKE INDEX: 44 ML/M2
DOP CALC LVOT STROKE VOLUME: 88.61
E WAVE DECELERATION TIME: 148 MS
E/A RATIO: 0.93
FRACTIONAL SHORTENING: 37 (ref 28–44)
INTERVENTRICULAR SEPTUM IN DIASTOLE (PARASTERNAL SHORT AXIS VIEW): 1.2 CM
INTERVENTRICULAR SEPTUM: 1.4 CM (ref 0.6–1.1)
LAAS-AP2: 19.9 CM2
LAAS-AP4: 16.9 CM2
LEFT ATRIUM AREA SYSTOLE SINGLE PLANE A4C: 16.2 CM2
LEFT ATRIUM SIZE: 3.9 CM
LEFT ATRIUM VOLUME (MOD BIPLANE): 57 ML
LEFT ATRIUM VOLUME INDEX (MOD BIPLANE): 27.5 ML/M2
LEFT INTERNAL DIMENSION IN SYSTOLE: 2.9 CM (ref 2.1–4)
LEFT VENTRICULAR INTERNAL DIMENSION IN DIASTOLE: 4.6 CM (ref 3.5–6)
LEFT VENTRICULAR POSTERIOR WALL IN END DIASTOLE: 1.1 CM
LEFT VENTRICULAR STROKE VOLUME: 64 ML
LVSV (TEICH): 64 ML
MV E'TISSUE VEL-LAT: 12 CM/S
MV E'TISSUE VEL-SEP: 11 CM/S
MV PEAK A VEL: 0.99 M/S
MV PEAK E VEL: 92 CM/S
MV STENOSIS PRESSURE HALF TIME: 43 MS
MV VALVE AREA P 1/2 METHOD: 5.12
RIGHT ATRIUM AREA SYSTOLE A4C: 12.5 CM2
RIGHT VENTRICLE ID DIMENSION: 3.9 CM
SL CV LEFT ATRIUM LENGTH A2C: 5.6 CM
SL CV LV EF: 70
SL CV PED ECHO LEFT VENTRICLE DIASTOLIC VOLUME (MOD BIPLANE) 2D: 96 ML
SL CV PED ECHO LEFT VENTRICLE SYSTOLIC VOLUME (MOD BIPLANE) 2D: 32 ML
TR MAX PG: 25 MMHG
TR PEAK VELOCITY: 2.5 M/S
TRICUSPID ANNULAR PLANE SYSTOLIC EXCURSION: 1.9 CM
TRICUSPID VALVE PEAK REGURGITATION VELOCITY: 2.5 M/S

## 2024-11-22 PROCEDURE — 93306 TTE W/DOPPLER COMPLETE: CPT

## 2024-11-22 PROCEDURE — 93306 TTE W/DOPPLER COMPLETE: CPT | Performed by: INTERNAL MEDICINE

## 2024-11-22 NOTE — TELEPHONE ENCOUNTER
Received a call from wife and went overt he provider message about the results of echo verbally understood

## 2024-12-06 ENCOUNTER — OFFICE VISIT (OUTPATIENT)
Dept: UROLOGY | Facility: CLINIC | Age: 64
End: 2024-12-06
Payer: MEDICARE

## 2024-12-06 VITALS
OXYGEN SATURATION: 96 % | WEIGHT: 209 LBS | DIASTOLIC BLOOD PRESSURE: 82 MMHG | BODY MASS INDEX: 31.67 KG/M2 | SYSTOLIC BLOOD PRESSURE: 142 MMHG | HEART RATE: 62 BPM | TEMPERATURE: 96.8 F | HEIGHT: 68 IN

## 2024-12-06 DIAGNOSIS — R35.1 BENIGN PROSTATIC HYPERPLASIA WITH NOCTURIA: Primary | ICD-10-CM

## 2024-12-06 DIAGNOSIS — N40.1 BENIGN PROSTATIC HYPERPLASIA WITH NOCTURIA: Primary | ICD-10-CM

## 2024-12-06 LAB — POST-VOID RESIDUAL VOLUME, ML POC: 97 ML

## 2024-12-06 PROCEDURE — 99214 OFFICE O/P EST MOD 30 MIN: CPT | Performed by: UROLOGY

## 2024-12-06 PROCEDURE — 51798 US URINE CAPACITY MEASURE: CPT | Performed by: UROLOGY

## 2024-12-06 NOTE — PROGRESS NOTES
UROLOGY PROGRESS NOTE         NAME: Anjum Santos  AGE: 64 y.o. SEX: male  : 1960   MRN: [id number]    DATE: 2024  TIME: 11:30 AM    Assessment and Plan      Impression:   1. Benign prostatic hyperplasia with nocturia  -     POCT Measure PVR  -     PSA Total, Diagnostic; Future; Expected date: 2025    Mild to moderate BPH symptoms.  Hold off on resection of tissue at apex.  He will continue the finasteride.  Will see him in 1 year.   Plan: Follow-up 1 year with a PSA.  Continue finasteride.  He will speak up if there is more troubles.      Chief Complaint     Chief Complaint   Patient presents with    Benign Prostatic Hypertrophy     History of Present Illness     HPI: Anjum Santos is a 64 y.o. year old male who presents with striae of BPH status post TURP.  Patient has some tissue at the apex.  Close to his external sphincter.  He has some hesitancy of urination and slow flow he sits to void.  He has nocturia once per night.  He has been on finasteride for 6 months.  The only improvement is that his nocturia occurs closer to the morning now.  Flomax did not help his symptoms.  He does have some back troubles limiting his physical activity.  No hematuria.  No incontinence.              The following portions of the patient's history were reviewed and updated as appropriate: allergies, current medications, past family history, past medical history, past social history, past surgical history and problem list.  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  CV: Denies chest pain.  Resp: Denies SOB.  GI: Denies abdominal pain, nausea and vomiting.  : Denies symptoms other than stated above.  Musculo: Denies back pain.    Objective   /82   Pulse 62   Temp (!) 96.8 °F (36 °C)   Ht 5' 8\" (1.727 m)   Wt 94.8 kg (209 lb)   SpO2 96%   BMI 31.78 kg/m²     Physical Exam  Const: Appears healthy and well developed. No signs of acute distress present.  Resp: Respirations are regular and unlabored.   CV: Rate is regular. Rhythm is regular.  Abdomen: Abdomen is soft, nontender, and nondistended. Kidneys are not palpable.  : Penis testicles epididymis normal.  He has a spermatocele on the left measuring a centimeter and a half.  Nontender.  Psych: Patient's attitude is cooperative. Mood is normal. Affect is normal.    Current Medications     Current Outpatient Medications:     amLODIPine (NORVASC) 10 mg tablet, Take 1 tablet (10 mg total) by mouth daily, Disp: 30 tablet, Rfl: 11    Cholecalciferol (Vitamin D3) 10 MCG (400 UNIT) CHEW, Chew 2 tablets daily, Disp: , Rfl:     divalproex sodium (DEPAKOTE) 500 mg EC tablet, Take 500 mg by mouth One in the AM and two in the PM, Disp: , Rfl:     docusate sodium (COLACE) 100 mg capsule, Take 100 mg by mouth 2 (two) times a day, Disp: , Rfl:     fenofibrate (TRICOR) 48 mg tablet, Take 1 tablet (48 mg total) by mouth daily, Disp: 90 tablet, Rfl: 3    finasteride (PROSCAR) 5 mg tablet, Take 1 tablet (5 mg total) by mouth daily, Disp: 90 tablet, Rfl: 3    fluvoxaMINE (LUVOX) 100 mg tablet, Take 100 mg " by mouth 2 (two) times a day, Disp: , Rfl:     levothyroxine 125 mcg tablet, , Disp: , Rfl:     losartan (COZAAR) 100 MG tablet, Take 1 tablet (100 mg total) by mouth daily, Disp: 30 tablet, Rfl: 11    Melatonin 10 MG TABS, Take 10 mg by mouth daily, Disp: , Rfl:     Memantine HCl ER 28 MG CP24, 28 mg in the morning, Disp: , Rfl:     mirtazapine (REMERON SOL-TAB) 45 mg dispersible tablet, Take 45 mg by mouth daily at bedtime, Disp: , Rfl:     omeprazole (PriLOSEC OTC) 20 MG tablet, Take 20 mg by mouth daily, Disp: , Rfl:     pravastatin (PRAVACHOL) 80 mg tablet, Take 80 mg by mouth daily , Disp: , Rfl:     pregabalin (LYRICA) 150 mg capsule, , Disp: , Rfl:     topiramate (TOPAMAX) 100 mg tablet, Take 100 mg by mouth 2 (two) times a day , Disp: , Rfl:     vitamin B-12 (CYANOCOBALAMIN) 100 MCG TABS, Take 1 tablet by mouth daily, Disp: , Rfl:         Frank D'Amico, MD

## 2025-03-09 ENCOUNTER — HOSPITAL ENCOUNTER (EMERGENCY)
Facility: HOSPITAL | Age: 65
Discharge: HOME | End: 2025-03-09
Attending: EMERGENCY MEDICINE
Payer: MEDICARE

## 2025-03-09 ENCOUNTER — APPOINTMENT (EMERGENCY)
Dept: CT IMAGING | Facility: HOSPITAL | Age: 65
End: 2025-03-09
Payer: MEDICARE

## 2025-03-09 VITALS
DIASTOLIC BLOOD PRESSURE: 72 MMHG | HEART RATE: 98 BPM | BODY MASS INDEX: 31.61 KG/M2 | HEIGHT: 68 IN | OXYGEN SATURATION: 94 % | SYSTOLIC BLOOD PRESSURE: 157 MMHG | RESPIRATION RATE: 20 BRPM | TEMPERATURE: 98.5 F | WEIGHT: 208.56 LBS

## 2025-03-09 DIAGNOSIS — R41.82 ALTERED MENTAL STATUS: Primary | ICD-10-CM

## 2025-03-09 DIAGNOSIS — E87.6 HYPOKALEMIA: ICD-10-CM

## 2025-03-09 DIAGNOSIS — J11.1 INFLUENZA: ICD-10-CM

## 2025-03-09 LAB
2HR DELTA HS TROPONIN: 8 NG/L
ALBUMIN SERPL BCG-MCNC: 4.1 G/DL (ref 3.5–5)
ALP SERPL-CCNC: 70 U/L (ref 34–104)
ALT SERPL W P-5'-P-CCNC: 41 U/L (ref 7–52)
ANION GAP SERPL CALCULATED.3IONS-SCNC: 9 MMOL/L (ref 4–13)
APTT PPP: 32 SECONDS (ref 23–34)
AST SERPL W P-5'-P-CCNC: 117 U/L (ref 13–39)
BACTERIA UR QL AUTO: NORMAL /HPF
BASOPHILS # BLD AUTO: 0.04 THOUSANDS/ÂΜL (ref 0–0.1)
BASOPHILS NFR BLD AUTO: 1 % (ref 0–1)
BILIRUB SERPL-MCNC: 0.95 MG/DL (ref 0.2–1)
BILIRUB UR QL STRIP: NEGATIVE
BUN SERPL-MCNC: 17 MG/DL (ref 5–25)
CALCIUM SERPL-MCNC: 8.2 MG/DL (ref 8.4–10.2)
CARDIAC TROPONIN I PNL SERPL HS: 19 NG/L (ref ?–50)
CARDIAC TROPONIN I PNL SERPL HS: 27 NG/L (ref ?–50)
CHLORIDE SERPL-SCNC: 113 MMOL/L (ref 96–108)
CLARITY UR: CLEAR
CO2 SERPL-SCNC: 20 MMOL/L (ref 21–32)
COLOR UR: YELLOW
CREAT SERPL-MCNC: 1.3 MG/DL (ref 0.6–1.3)
EOSINOPHIL # BLD AUTO: 0 THOUSAND/ÂΜL (ref 0–0.61)
EOSINOPHIL NFR BLD AUTO: 0 % (ref 0–6)
ERYTHROCYTE [DISTWIDTH] IN BLOOD BY AUTOMATED COUNT: 13.7 % (ref 11.6–15.1)
FLUAV AG UPPER RESP QL IA.RAPID: POSITIVE
FLUBV AG UPPER RESP QL IA.RAPID: NEGATIVE
GFR SERPL CREATININE-BSD FRML MDRD: 58 ML/MIN/1.73SQ M
GLUCOSE SERPL-MCNC: 106 MG/DL (ref 65–140)
GLUCOSE UR STRIP-MCNC: NEGATIVE MG/DL
HCT VFR BLD AUTO: 35.6 % (ref 36.5–49.3)
HGB BLD-MCNC: 11.5 G/DL (ref 12–17)
HGB UR QL STRIP.AUTO: ABNORMAL
IMM GRANULOCYTES # BLD AUTO: 0.04 THOUSAND/UL (ref 0–0.2)
IMM GRANULOCYTES NFR BLD AUTO: 1 % (ref 0–2)
INR PPP: 1.23 (ref 0.85–1.19)
KETONES UR STRIP-MCNC: NEGATIVE MG/DL
LACTATE SERPL-SCNC: 1.1 MMOL/L (ref 0.5–2)
LEUKOCYTE ESTERASE UR QL STRIP: NEGATIVE
LYMPHOCYTES # BLD AUTO: 0.53 THOUSANDS/ÂΜL (ref 0.6–4.47)
LYMPHOCYTES NFR BLD AUTO: 10 % (ref 14–44)
MAGNESIUM SERPL-MCNC: 1.8 MG/DL (ref 1.9–2.7)
MCH RBC QN AUTO: 31.6 PG (ref 26.8–34.3)
MCHC RBC AUTO-ENTMCNC: 32.3 G/DL (ref 31.4–37.4)
MCV RBC AUTO: 98 FL (ref 82–98)
MONOCYTES # BLD AUTO: 0.59 THOUSAND/ÂΜL (ref 0.17–1.22)
MONOCYTES NFR BLD AUTO: 11 % (ref 4–12)
NEUTROPHILS # BLD AUTO: 4 THOUSANDS/ÂΜL (ref 1.85–7.62)
NEUTS SEG NFR BLD AUTO: 77 % (ref 43–75)
NITRITE UR QL STRIP: NEGATIVE
NON-SQ EPI CELLS URNS QL MICRO: NORMAL /HPF
NRBC BLD AUTO-RTO: 0 /100 WBCS
PH UR STRIP.AUTO: 6.5 [PH]
PLATELET # BLD AUTO: 141 THOUSANDS/UL (ref 149–390)
PMV BLD AUTO: 10.8 FL (ref 8.9–12.7)
POTASSIUM SERPL-SCNC: 3.1 MMOL/L (ref 3.5–5.3)
PROCALCITONIN SERPL-MCNC: 0.62 NG/ML
PROT SERPL-MCNC: 6.5 G/DL (ref 6.4–8.4)
PROT UR STRIP-MCNC: NEGATIVE MG/DL
PROTHROMBIN TIME: 15.9 SECONDS (ref 12.3–15)
RBC # BLD AUTO: 3.64 MILLION/UL (ref 3.88–5.62)
RBC #/AREA URNS AUTO: NORMAL /HPF
SARS-COV+SARS-COV-2 AG RESP QL IA.RAPID: NEGATIVE
SODIUM SERPL-SCNC: 142 MMOL/L (ref 135–147)
SP GR UR STRIP.AUTO: <=1.005 (ref 1–1.03)
UROBILINOGEN UR QL STRIP.AUTO: 1 E.U./DL
WBC # BLD AUTO: 5.2 THOUSAND/UL (ref 4.31–10.16)
WBC #/AREA URNS AUTO: NORMAL /HPF

## 2025-03-09 PROCEDURE — 81001 URINALYSIS AUTO W/SCOPE: CPT | Performed by: EMERGENCY MEDICINE

## 2025-03-09 PROCEDURE — 70450 CT HEAD/BRAIN W/O DYE: CPT

## 2025-03-09 PROCEDURE — 71260 CT THORAX DX C+: CPT

## 2025-03-09 PROCEDURE — 99285 EMERGENCY DEPT VISIT HI MDM: CPT

## 2025-03-09 PROCEDURE — 85610 PROTHROMBIN TIME: CPT | Performed by: EMERGENCY MEDICINE

## 2025-03-09 PROCEDURE — 84145 PROCALCITONIN (PCT): CPT | Performed by: EMERGENCY MEDICINE

## 2025-03-09 PROCEDURE — 96375 TX/PRO/DX INJ NEW DRUG ADDON: CPT

## 2025-03-09 PROCEDURE — 85025 COMPLETE CBC W/AUTO DIFF WBC: CPT | Performed by: EMERGENCY MEDICINE

## 2025-03-09 PROCEDURE — 83605 ASSAY OF LACTIC ACID: CPT | Performed by: EMERGENCY MEDICINE

## 2025-03-09 PROCEDURE — 36415 COLL VENOUS BLD VENIPUNCTURE: CPT | Performed by: EMERGENCY MEDICINE

## 2025-03-09 PROCEDURE — 80053 COMPREHEN METABOLIC PANEL: CPT | Performed by: EMERGENCY MEDICINE

## 2025-03-09 PROCEDURE — 87804 INFLUENZA ASSAY W/OPTIC: CPT | Performed by: EMERGENCY MEDICINE

## 2025-03-09 PROCEDURE — 87811 SARS-COV-2 COVID19 W/OPTIC: CPT | Performed by: EMERGENCY MEDICINE

## 2025-03-09 PROCEDURE — 74177 CT ABD & PELVIS W/CONTRAST: CPT

## 2025-03-09 PROCEDURE — 93005 ELECTROCARDIOGRAM TRACING: CPT

## 2025-03-09 PROCEDURE — 99285 EMERGENCY DEPT VISIT HI MDM: CPT | Performed by: EMERGENCY MEDICINE

## 2025-03-09 PROCEDURE — 96365 THER/PROPH/DIAG IV INF INIT: CPT

## 2025-03-09 PROCEDURE — 96366 THER/PROPH/DIAG IV INF ADDON: CPT

## 2025-03-09 PROCEDURE — 87040 BLOOD CULTURE FOR BACTERIA: CPT | Performed by: EMERGENCY MEDICINE

## 2025-03-09 PROCEDURE — 85730 THROMBOPLASTIN TIME PARTIAL: CPT | Performed by: EMERGENCY MEDICINE

## 2025-03-09 PROCEDURE — 84484 ASSAY OF TROPONIN QUANT: CPT | Performed by: EMERGENCY MEDICINE

## 2025-03-09 PROCEDURE — 83735 ASSAY OF MAGNESIUM: CPT | Performed by: EMERGENCY MEDICINE

## 2025-03-09 RX ORDER — SODIUM CHLORIDE, SODIUM GLUCONATE, SODIUM ACETATE, POTASSIUM CHLORIDE, MAGNESIUM CHLORIDE, SODIUM PHOSPHATE, DIBASIC, AND POTASSIUM PHOSPHATE .53; .5; .37; .037; .03; .012; .00082 G/100ML; G/100ML; G/100ML; G/100ML; G/100ML; G/100ML; G/100ML
500 INJECTION, SOLUTION INTRAVENOUS ONCE
Status: COMPLETED | OUTPATIENT
Start: 2025-03-09 | End: 2025-03-09

## 2025-03-09 RX ORDER — POTASSIUM CHLORIDE 1500 MG/1
20 TABLET, EXTENDED RELEASE ORAL 2 TIMES DAILY
Qty: 10 TABLET | Refills: 0 | Status: SHIPPED | OUTPATIENT
Start: 2025-03-09 | End: 2025-03-14

## 2025-03-09 RX ORDER — POTASSIUM CHLORIDE 20MEQ/15ML
40 LIQUID (ML) ORAL ONCE
Status: COMPLETED | OUTPATIENT
Start: 2025-03-09 | End: 2025-03-09

## 2025-03-09 RX ORDER — FENTANYL CITRATE 50 UG/ML
50 INJECTION, SOLUTION INTRAMUSCULAR; INTRAVENOUS ONCE
Refills: 0 | Status: COMPLETED | OUTPATIENT
Start: 2025-03-09 | End: 2025-03-09

## 2025-03-09 RX ORDER — OSELTAMIVIR PHOSPHATE 75 MG/1
75 CAPSULE ORAL EVERY 12 HOURS
Qty: 10 CAPSULE | Refills: 0 | Status: SHIPPED | OUTPATIENT
Start: 2025-03-09 | End: 2025-03-09

## 2025-03-09 RX ORDER — OSELTAMIVIR PHOSPHATE 30 MG/1
30 CAPSULE ORAL 2 TIMES DAILY
Qty: 10 CAPSULE | Refills: 0 | Status: SHIPPED | OUTPATIENT
Start: 2025-03-09 | End: 2025-03-14

## 2025-03-09 RX ORDER — SODIUM CHLORIDE, SODIUM GLUCONATE, SODIUM ACETATE, POTASSIUM CHLORIDE, MAGNESIUM CHLORIDE, SODIUM PHOSPHATE, DIBASIC, AND POTASSIUM PHOSPHATE .53; .5; .37; .037; .03; .012; .00082 G/100ML; G/100ML; G/100ML; G/100ML; G/100ML; G/100ML; G/100ML
1000 INJECTION, SOLUTION INTRAVENOUS ONCE
Status: COMPLETED | OUTPATIENT
Start: 2025-03-09 | End: 2025-03-09

## 2025-03-09 RX ORDER — OSELTAMIVIR PHOSPHATE 75 MG/1
75 CAPSULE ORAL ONCE
Status: COMPLETED | OUTPATIENT
Start: 2025-03-09 | End: 2025-03-09

## 2025-03-09 RX ADMIN — OSELTAMAVIR PHOSPHATE 75 MG: 75 CAPSULE ORAL at 11:29

## 2025-03-09 RX ADMIN — FENTANYL CITRATE 50 MCG: 50 INJECTION INTRAMUSCULAR; INTRAVENOUS at 10:40

## 2025-03-09 RX ADMIN — SODIUM CHLORIDE, SODIUM GLUCONATE, SODIUM ACETATE, POTASSIUM CHLORIDE, MAGNESIUM CHLORIDE, SODIUM PHOSPHATE, DIBASIC, AND POTASSIUM PHOSPHATE 500 ML: .53; .5; .37; .037; .03; .012; .00082 INJECTION, SOLUTION INTRAVENOUS at 10:35

## 2025-03-09 RX ADMIN — SODIUM CHLORIDE, SODIUM GLUCONATE, SODIUM ACETATE, POTASSIUM CHLORIDE, MAGNESIUM CHLORIDE, SODIUM PHOSPHATE, DIBASIC, AND POTASSIUM PHOSPHATE 1000 ML: .53; .5; .37; .037; .03; .012; .00082 INJECTION, SOLUTION INTRAVENOUS at 10:31

## 2025-03-09 RX ADMIN — POTASSIUM CHLORIDE 40 MEQ: 1.5 SOLUTION ORAL at 12:12

## 2025-03-09 RX ADMIN — SODIUM CHLORIDE, SODIUM GLUCONATE, SODIUM ACETATE, POTASSIUM CHLORIDE, MAGNESIUM CHLORIDE, SODIUM PHOSPHATE, DIBASIC, AND POTASSIUM PHOSPHATE 1000 ML: .53; .5; .37; .037; .03; .012; .00082 INJECTION, SOLUTION INTRAVENOUS at 12:21

## 2025-03-09 RX ADMIN — IOHEXOL 100 ML: 350 INJECTION, SOLUTION INTRAVENOUS at 11:44

## 2025-03-09 NOTE — ED PROVIDER NOTES
Time reflects when diagnosis was documented in both MDM as applicable and the Disposition within this note       Time User Action Codes Description Comment    3/9/2025 11:57 AM Ever Horan [R41.82] Altered mental status     3/9/2025 11:57 AM Ever Horan [J11.1] Influenza     3/9/2025 11:57 AM Ever Horan [E87.6] Hypokalemia           ED Disposition       ED Disposition   Discharge    Condition   Stable    Date/Time   Sun Mar 9, 2025  1:15 PM    Comment   Anjum Santos discharge to home/self care.                   Assessment & Plan       Medical Decision Making  Patient presented to the emergency department and a MSE was performed. The patient was evaluated for complaint related to acute change in mental status.  Patient is potentially at risk for, but not limited to, acid-base imbalance, endocrine disorder, seizure disorder, multifactorial or single etiology encephalopathy, pneumonia, urinary tract infection, pyelonephritis, other infectious process, medication overdose, trauma, hypo or hyperglycemia, uremia, syncope, stroke, substance use disorder or mental health disorder. Several of these diagnoses have been evaluated and ruled out by history and physical.  As needed, patient will be further evaluated with laboratory and imaging studies.  Higher level diagnostics, such as CT imaging or ultrasound, may also be required.  Please see work-up portion of the note for further evaluation of patient's risk.  Socioeconomic factors were also considered as part of the decision-making process.  Unless otherwise stated in the chart or patient is admitted as elsewhere documented, any previously prescribed medications will be maintained.      Problems Addressed:  Altered mental status: complicated acute illness or injury  Hypokalemia: acute illness or injury  Influenza: acute illness or injury     Details: Patient improved while in the emergency department.  He is stable for discharge.  He was ambulatory  as per his baseline.    Amount and/or Complexity of Data Reviewed  Labs: ordered. Decision-making details documented in ED Course.  Radiology: ordered. Decision-making details documented in ED Course.  ECG/medicine tests: ordered. Decision-making details documented in ED Course.    Risk  Prescription drug management.        ED Course as of 03/09/25 1319   Sun Mar 09, 2025   1053 Influenza A Rapid Antigen(!): Positive   1054 Patient with influenza.  Patient will be started on Tamiflu.   1156 Potassium(!): 3.1  Will replace.   1302 Will attempt ambulation.  Wife reports that patient is normally able to ambulate   1314 Patient now ambulating as per baseline.   1314 No acute abnormality in the chest abdomen pelvis.   1314 Delta 2hr hsTnI: 8       Medications   multi-electrolyte (Plasmalyte-A/Isolyte-S PH 7.4/Normosol-R) IV bolus 1,000 mL (1,000 mL Intravenous New Bag 3/9/25 1031)     Followed by   multi-electrolyte (Plasmalyte-A/Isolyte-S PH 7.4/Normosol-R) IV bolus 1,000 mL (1,000 mL Intravenous New Bag 3/9/25 1221)   multi-electrolyte (Plasmalyte-A/Isolyte-S PH 7.4/Normosol-R) IV bolus 500 mL (0 mL Intravenous Stopped 3/9/25 1222)   fentaNYL injection 50 mcg (50 mcg Intravenous Given 3/9/25 1040)   oseltamivir (TAMIFLU) capsule 75 mg (75 mg Oral Given 3/9/25 1129)   iohexol (OMNIPAQUE) 350 MG/ML injection (MULTI-DOSE) 100 mL (100 mL Intravenous Given 3/9/25 1144)   potassium chloride oral solution 40 mEq (40 mEq Oral Given 3/9/25 1212)       ED Risk Strat Scores                            SBIRT 20yo+      Flowsheet Row Most Recent Value   Initial Alcohol Screen: US AUDIT-C     1. How often do you have a drink containing alcohol? 0 Filed at: 03/09/2025 1027   2. How many drinks containing alcohol do you have on a typical day you are drinking?  0 Filed at: 03/09/2025 1027   3a. Male UNDER 65: How often do you have five or more drinks on one occasion? 0 Filed at: 03/09/2025 1027   Audit-C Score 0 Filed at: 03/09/2025  1027   NOY: How many times in the past year have you...    Used an illegal drug or used a prescription medication for non-medical reasons? Never Filed at: 2025 1027                            History of Present Illness       Chief Complaint   Patient presents with    Altered Mental Status     Pt sent by ambulance due for family concern for AMS and weakness since yesterday- hx of TBI- on arrival patient offers minimal complaints, only oriented to self and place        Past Medical History:   Diagnosis Date    Abnormal EKG     Aneurysm of anterior cerebral artery     Anxiety     Bipolar disorder (HCC)     BPH (benign prostatic hyperplasia)     Chronic migraine     CPAP (continuous positive airway pressure) dependence     Depression     Disease of thyroid gland     Hesitancy of micturition     Hydrocephalus (HCC)     Hyperlipidemia     Hypertension     Hypertriglyceridemia     Hypothyroidism     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     Neutropenia (HCC)     Primary thrombocytopenia (HCC)     Seizure (HCC)     Sleep apnea     Vitamin D deficiency       Past Surgical History:   Procedure Laterality Date    BRAIN SURGERY      CARDIAC CATHETERIZATION      CHOLECYSTECTOMY      PROSTATE SURGERY  2014    streched the prostate    VENTRICULOPERITONEAL SHUNT        Family History   Problem Relation Age of Onset    Anuerysm Mother     Colon cancer Father     Hypertension Father     Heart disease Sister     Heart attack Sister     Breast cancer Sister     Stomach cancer Brother     Testicular cancer Brother     Skin cancer Brother     Diabetes Son     Thyroid disease Daughter     Cleft palate Daughter     Cleft lip Daughter       Social History     Tobacco Use    Smoking status: Former     Current packs/day: 0.00     Types: Cigarettes     Start date:      Quit date:      Years since quittin.2    Smokeless tobacco: Never   Vaping Use    Vaping status: Never Used  "  Substance Use Topics    Alcohol use: Not Currently     Comment: extremely rare    Drug use: Never      E-Cigarette/Vaping    E-Cigarette Use Never User       E-Cigarette/Vaping Substances    Nicotine No     THC No     CBD No     Flavoring No       I have reviewed and agree with the history as documented.     Patient is a 64-year-old male with a history of traumatic brain injury who wife reports has had multiple symptoms over the last several days including cough, body aches, congestion and feeling short of breath.  Wife reports that the patient has become generally weak and having difficulty ambulating.  Patient normally ambulates on his own without the use of a walker or cane and over the last 24 hours has been unable to get get up without much assistance.  Wife reports that the patient has had \"chills and he is dead weight.\"      History provided by:  Patient and spouse  Altered Mental Status  Presenting symptoms: confusion    Associated symptoms: weakness        Review of Systems   Constitutional:  Positive for chills and fatigue.   Respiratory:  Positive for cough, shortness of breath and wheezing.    Musculoskeletal:  Positive for back pain.   Neurological:  Positive for weakness.   Psychiatric/Behavioral:  Positive for confusion.            Objective       ED Triage Vitals   Temperature Pulse Blood Pressure Respirations SpO2 Patient Position - Orthostatic VS   03/09/25 0956 03/09/25 0932 03/09/25 0932 03/09/25 0932 03/09/25 0932 03/09/25 1045   99.3 °F (37.4 °C) 103 159/82 18 95 % Lying      Temp Source Heart Rate Source BP Location FiO2 (%) Pain Score    03/09/25 0956 03/09/25 0932 03/09/25 1045 -- 03/09/25 1040    Oral Monitor Right arm  10 - Worst Possible Pain      Vitals      Date and Time Temp Pulse SpO2 Resp BP Pain Score FACES Pain Rating User   03/09/25 1253 98.5 °F (36.9 °C) -- -- -- -- -- --    03/09/25 1130 -- 99 98 % 20 156/67 -- --    03/09/25 1115 -- 83 98 % 18 119/56 -- --    03/09/25 " 1051 -- -- 90 % -- -- -- --    03/09/25 1045 -- 91  87 % 20 119/59 -- --    03/09/25 1040 -- -- -- -- -- 10 - Worst Possible Pain --    03/09/25 0956 99.3 °F (37.4 °C) -- -- -- -- -- --    03/09/25 0932 -- 103 95 % 18 159/82 -- -- SS            Physical Exam  Vitals and nursing note reviewed.   Constitutional:       General: He is in acute distress.      Appearance: He is normal weight. He is not ill-appearing or toxic-appearing.   HENT:      Head: Normocephalic and atraumatic.      Right Ear: External ear normal.      Left Ear: External ear normal.      Nose: Nose normal.      Mouth/Throat:      Mouth: Mucous membranes are dry.   Cardiovascular:      Rate and Rhythm: Tachycardia present.   Pulmonary:      Effort: Pulmonary effort is normal. No respiratory distress.      Breath sounds: No stridor. Rhonchi present. No wheezing or rales.   Abdominal:      General: Abdomen is flat. There is no distension.      Tenderness: There is no abdominal tenderness. There is no right CVA tenderness or left CVA tenderness.   Musculoskeletal:         General: No signs of injury.   Skin:     Coloration: Skin is not pale.   Neurological:      Mental Status: He is alert. Mental status is at baseline.      Comments: Slow to respond.  Wife reports that this is not his normal mental status.  She does report that he is slightly better than when he was earlier today.   Psychiatric:         Mood and Affect: Mood normal.         Results Reviewed       Procedure Component Value Units Date/Time    HS Troponin I 2hr [[id number]]  (Normal) Collected: 03/09/25 1218    Lab Status: Final result Specimen: Blood from Arm, Right Updated: 03/09/25 1253     hs TnI 2hr 27 ng/L      Delta 2hr hsTnI 8 ng/L     Urine Microscopic [[id number]]  (Normal) Collected: 03/09/25 1217    Lab Status: Final result Specimen: Urine, Clean Catch Updated: 03/09/25 1242     RBC, UA None Seen /hpf      WBC, UA None Seen /hpf      Epithelial Cells None Seen /hpf       Bacteria, UA None Seen /hpf     UA w Reflex to Microscopic w Reflex to Culture [[id number]]  (Abnormal) Collected: 03/09/25 1217    Lab Status: Final result Specimen: Urine, Clean Catch Updated: 03/09/25 1236     Color, UA Yellow     Clarity, UA Clear     Specific Gravity, UA <=1.005     pH, UA 6.5     Leukocytes, UA Negative     Nitrite, UA Negative     Protein, UA Negative mg/dl      Glucose, UA Negative mg/dl      Ketones, UA Negative mg/dl      Urobilinogen, UA 1.0 E.U./dl      Bilirubin, UA Negative     Occult Blood, UA Trace-Intact    Blood culture #1 [[id number]] Collected: 03/09/25 1218    Lab Status: In process Specimen: Blood from Arm, Right Updated: 03/09/25 1228    HS Troponin I 4hr [[id number]]     Lab Status: No result Specimen: Blood     Protime-INR [[id number]]  (Abnormal) Collected: 03/09/25 1014    Lab Status: Final result Specimen: Blood from Arm, Left Updated: 03/09/25 1108     Protime 15.9 seconds      INR 1.23    Narrative:      INR Therapeutic Range    Indication                                             INR Range      Atrial Fibrillation                                               2.0-3.0  Hypercoagulable State                                    2.0.2.3  Left Ventricular Asist Device                            2.0-3.0  Mechanical Heart Valve                                  -    Aortic(with afib, MI, embolism, HF, LA enlargement,    and/or coagulopathy)                                     2.0-3.0 (2.5-3.5)     Mitral                                                             2.5-3.5  Prosthetic/Bioprosthetic Heart Valve               2.0-3.0  Venous thromboembolism (VTE: VT, PE        2.0-3.0    APTT [[id number]]  (Normal) Collected: 03/09/25 1014    Lab Status: Final result Specimen: Blood from Arm, Left Updated: 03/09/25 1108     PTT 32 seconds     Procalcitonin [[id number]]  (Abnormal) Collected: 03/09/25 1014    Lab Status: Final result Specimen: Blood from Arm, Left Updated: 03/09/25 1100      Procalcitonin 0.62 ng/ml     HS Troponin 0hr (reflex protocol) [[id number]]  (Normal) Collected: 03/09/25 1014    Lab Status: Final result Specimen: Blood from Arm, Left Updated: 03/09/25 1100     hs TnI 0hr 19 ng/L     Lactic acid [[id number]]  (Normal) Collected: 03/09/25 1014    Lab Status: Final result Specimen: Blood from Arm, Left Updated: 03/09/25 1055     LACTIC ACID 1.1 mmol/L     Narrative:      Result may be elevated if tourniquet was used during collection.    Comprehensive metabolic panel [[id number]]  (Abnormal) Collected: 03/09/25 1014    Lab Status: Final result Specimen: Blood from Arm, Left Updated: 03/09/25 1053     Sodium 142 mmol/L      Potassium 3.1 mmol/L      Chloride 113 mmol/L      CO2 20 mmol/L      ANION GAP 9 mmol/L      BUN 17 mg/dL      Creatinine 1.30 mg/dL      Glucose 106 mg/dL      Calcium 8.2 mg/dL       U/L      ALT 41 U/L      Alkaline Phosphatase 70 U/L      Total Protein 6.5 g/dL      Albumin 4.1 g/dL      Total Bilirubin 0.95 mg/dL      eGFR 58 ml/min/1.73sq m     Narrative:      National Kidney Disease Foundation guidelines for Chronic Kidney Disease (CKD):     Stage 1 with normal or high GFR (GFR > 90 mL/min/1.73 square meters)    Stage 2 Mild CKD (GFR = 60-89 mL/min/1.73 square meters)    Stage 3A Moderate CKD (GFR = 45-59 mL/min/1.73 square meters)    Stage 3B Moderate CKD (GFR = 30-44 mL/min/1.73 square meters)    Stage 4 Severe CKD (GFR = 15-29 mL/min/1.73 square meters)    Stage 5 End Stage CKD (GFR <15 mL/min/1.73 square meters)  Note: GFR calculation is accurate only with a steady state creatinine    Magnesium [[id number]]  (Abnormal) Collected: 03/09/25 1014    Lab Status: Final result Specimen: Blood from Arm, Left Updated: 03/09/25 1053     Magnesium 1.8 mg/dL     FLU/COVID Rapid Antigen (30 min. TAT) - Preferred screening test in ED [[id number]]  (Abnormal) Collected: 03/09/25 1025    Lab Status: Final result Specimen: Nares from Nose Updated: 03/09/25 1056      SARS COV Rapid Antigen Negative     Influenza A Rapid Antigen Positive     Influenza B Rapid Antigen Negative    Narrative:      This test has been performed using the [employer] Celia 2 FLU+SARS Antigen test under the Emergency Use Authorization (EUA). This test has been validated by the  and verified by the performing laboratory. The Celia uses lateral flow immunofluorescent sandwich assay to detect SARS-COV, Influenza A and Influenza B Antigen.     The [employer] Celia 2 SARS Antigen test does not differentiate between SARS-CoV and SARS-CoV-2.     Negative results are presumptive and may be confirmed with a molecular assay, if necessary, for patient management. Negative results do not rule out SARS-CoV-2 or influenza infection and should not be used as the sole basis for treatment or patient management decisions. A negative test result may occur if the level of antigen in a sample is below the limit of detection of this test.     Positive results are indicative of the presence of viral antigens, but do not rule out bacterial infection or co-infection with other viruses.     All test results should be used as an adjunct to clinical observations and other information available to the provider.    FOR PEDIATRIC PATIENTS - copy/paste COVID Guidelines URL to browser: [email or web address]    CBC and differential [[id number]]  (Abnormal) Collected: 03/09/25 1014    Lab Status: Final result Specimen: Blood from Arm, Left Updated: 03/09/25 1032     WBC 5.20 Thousand/uL      RBC 3.64 Million/uL      Hemoglobin 11.5 g/dL      Hematocrit 35.6 %      MCV 98 fL      MCH 31.6 pg      MCHC 32.3 g/dL      RDW 13.7 %      MPV 10.8 fL      Platelets 141 Thousands/uL      nRBC 0 /100 WBCs      Segmented % 77 %      Immature Grans % 1 %      Lymphocytes % 10 %      Monocytes % 11 %      Eosinophils Relative 0 %      Basophils Relative 1 %      Absolute Neutrophils 4.00  Thousands/µL      Absolute Immature Grans 0.04 Thousand/uL      Absolute Lymphocytes 0.53 Thousands/µL      Absolute Monocytes 0.59 Thousand/µL      Eosinophils Absolute 0.00 Thousand/µL      Basophils Absolute 0.04 Thousands/µL     Blood culture #2 [[id number]] Collected: 25 1025    Lab Status: In process Specimen: Blood from Hand, Right Updated: 25 1029            CT head without contrast   Final Interpretation by Gino Troncoso MD ( 1249)      No acute intracranial abnormality status post left MCA bifurcation aneurysm clipping.      No hydrocephalus status post right parietal approach ventricular catheter.      Mild-to-moderate sinus disease with findings suggestive of acute sinusitis.                        Workstation performed: [id number]         CT chest abdomen pelvis w contrast   Final Interpretation by Moe Samuel MD ( 130)      No acute abnormality in the chest, abdomen or pelvis.      Hepatosplenomegaly with hepatic steatosis         Workstation performed: [id number]             Procedures    ED Medication and Procedure Management   Prior to Admission Medications   Prescriptions Last Dose Informant Patient Reported? Taking?   Cholecalciferol (Vitamin D3) 10 MCG (400 UNIT) CHEW  Spouse/Significant Other Yes No   Sig: Chew 2 tablets daily   Melatonin 10 MG TABS  Spouse/Significant Other Yes No   Sig: Take 10 mg by mouth daily   Memantine HCl ER 28 MG CP24  Spouse/Significant Other Yes No   Si mg in the morning   amLODIPine (NORVASC) 10 mg tablet   No No   Sig: Take 1 tablet (10 mg total) by mouth daily   divalproex sodium (DEPAKOTE) 500 mg EC tablet  Spouse/Significant Other Yes No   Sig: Take 500 mg by mouth One in the AM and two in the PM   docusate sodium (COLACE) 100 mg capsule  Spouse/Significant Other Yes No   Sig: Take 100 mg by mouth 2 (two) times a day   fenofibrate (TRICOR) 48 mg tablet  Spouse/Significant Other No No   Sig: Take 1 tablet (48 mg total)  by mouth daily   finasteride (PROSCAR) 5 mg tablet   No No   Sig: Take 1 tablet (5 mg total) by mouth daily   fluvoxaMINE (LUVOX) 100 mg tablet  Spouse/Significant Other Yes No   Sig: Take 100 mg by mouth 2 (two) times a day   levothyroxine 125 mcg tablet  Spouse/Significant Other Yes No   losartan (COZAAR) 100 MG tablet  Spouse/Significant Other No No   Sig: Take 1 tablet (100 mg total) by mouth daily   mirtazapine (REMERON SOL-TAB) 45 mg dispersible tablet  Spouse/Significant Other Yes No   Sig: Take 45 mg by mouth daily at bedtime   omeprazole (PriLOSEC OTC) 20 MG tablet  Spouse/Significant Other Yes No   Sig: Take 20 mg by mouth daily   pravastatin (PRAVACHOL) 80 mg tablet  Spouse/Significant Other Yes No   Sig: Take 80 mg by mouth daily    pregabalin (LYRICA) 150 mg capsule  Spouse/Significant Other Yes No   topiramate (TOPAMAX) 100 mg tablet  Spouse/Significant Other Yes No   Sig: Take 100 mg by mouth 2 (two) times a day    vitamin B-12 (CYANOCOBALAMIN) 100 MCG TABS  Spouse/Significant Other Yes No   Sig: Take 1 tablet by mouth daily      Facility-Administered Medications: None     Patient's Medications   Discharge Prescriptions    OSELTAMIVIR (TAMIFLU) 30 MG CAPSULE    Take 1 capsule (30 mg total) by mouth 2 (two) times a day for 5 days       Start Date: 3/9/2025  End Date: 3/14/2025       Order Dose: 30 mg       Quantity: 10 capsule    Refills: 0    POTASSIUM CHLORIDE (KLOR-CON M20) 20 MEQ TABLET    Take 1 tablet (20 mEq total) by mouth 2 (two) times a day for 5 days       Start Date: 3/9/2025  End Date: 3/14/2025       Order Dose: 20 mEq       Quantity: 10 tablet    Refills: 0     No discharge procedures on file.  ED SEPSIS DOCUMENTATION   Time reflects when diagnosis was documented in both MDM as applicable and the Disposition within this note       Time User Action Codes Description Comment    3/9/2025 11:57 AM Ever Horan [R41.82] Altered mental status     3/9/2025 11:57 AM Ever Horan  [J11.1] Influenza     3/9/2025 11:57 AM Ever Horan Add [E87.6] Hypokalemia                  Ever Horan,   03/09/25 8139

## 2025-03-09 NOTE — DISCHARGE INSTRUCTIONS
Continue all your normal medications.  We have supplement your potassium for a few days.  We have also started you on an medication to help with influenza.  Please return with any worsening symptoms.  Please follow-up with your primary care doctor as needed.    [facility] We appreciated the opportunity and privilege to address your healthcare needs. We remain available to you should you require additional evaluation or assistance. We value your feedback and would appreciate the opportunity to address anything you identified as an opportunity to improve or where we excelled. If there are colleagues who deserve special recognition, please let us know! We hope you are feeling better soon!    Please also note that sometimes there are subtle abnormalities in your lab values that you may observe when you access your record online.  These are frequently not worrisome and if they are of concern we will have discussed them with you.  However, we always encourage that you discuss any concerns you may have or observe on your record with your primary care provider.   Please also note that while your visit documentation was reviewed prior to completion, voice transcription will occasionally recognize words or grammar differently than what was spoken.

## 2025-03-10 LAB
ATRIAL RATE: 95 BPM
P AXIS: 54 DEGREES
PR INTERVAL: 138 MS
QRS AXIS: -43 DEGREES
QRSD INTERVAL: 98 MS
QT INTERVAL: 356 MS
QTC INTERVAL: 447 MS
T WAVE AXIS: 18 DEGREES
VENTRICULAR RATE: 95 BPM

## 2025-03-10 PROCEDURE — 93010 ELECTROCARDIOGRAM REPORT: CPT | Performed by: INTERNAL MEDICINE

## 2025-03-14 LAB
BACTERIA BLD CULT: NORMAL
BACTERIA BLD CULT: NORMAL

## 2025-03-28 DIAGNOSIS — E78.2 MIXED HYPERLIPIDEMIA: ICD-10-CM

## 2025-03-28 RX ORDER — FENOFIBRATE 48 MG/1
48 TABLET, COATED ORAL DAILY
Qty: 90 TABLET | Refills: 1 | Status: SHIPPED | OUTPATIENT
Start: 2025-03-28

## 2025-06-02 ENCOUNTER — NURSE TRIAGE (OUTPATIENT)
Age: 65
End: 2025-06-02

## 2025-06-02 NOTE — TELEPHONE ENCOUNTER
"REASON FOR CONVERSATION: Medication Problem  Received a call from spouse(on HIPAA from), BP have been elevated, don't have specific numbers, ranging S -160's DBP upper 80;s HR 50's  Per wife advised that in May medication was changed by provider. She believes it was the fenofibrate  SYMPTOMS: elevated BP's,chest pains, palpations    OTHER HEALTH INFORMATION:   Lat office 11/2024  PROTOCOL DISPOSITION: No disposition on file.    CARE ADVICE PROVIDED:   Recommended ED if chest pain,  Discuss with provider about medication adjustment    PRACTICE FOLLOW-UP:   Follow up with spouse after recommendations from provider     Reason for Disposition  • Caller has NON-URGENT medicine question about med that PCP or specialist prescribed and triager unable to answer question    Answer Assessment - Initial Assessment Questions  1. NAME of MEDICINE: \"What medicine(s) are you calling about?\"      Fenofibrate   2. QUESTION: \"What is your question?\" (e.g., double dose of medicine, side effect)      Medication recently changed  3. PRESCRIBER: \"Who prescribed the medicine?\" Reason: if prescribed by specialist, call should be referred to that group.        4. SYMPTOMS: \"Do you have any symptoms?\" If Yes, ask: \"What symptoms are you having?\"  \"How bad are the symptoms (e.g., mild, moderate, severe)  Chest pains, palpations- advised ED if having active chest pains- wife verbally understood    Protocols used: Medication Question Call-Adult-[place]    "

## 2025-06-02 NOTE — TELEPHONE ENCOUNTER
He unfortunately missed his appointment with me last week! Please reschedule.  In the mean time we can add spironolactone which should help his blood pressure and improve his potassium. We would need to monitor kidney function with labs 1 week after starting. If wife agrees let me know and I will order.

## 2025-06-04 RX ORDER — PREGABALIN 75 MG/1
75 CAPSULE ORAL 2 TIMES DAILY
COMMUNITY
Start: 2025-05-25

## 2025-06-04 RX ORDER — CARVEDILOL 3.12 MG/1
3.12 TABLET ORAL 2 TIMES DAILY WITH MEALS
COMMUNITY
Start: 2025-05-22

## 2025-06-04 RX ORDER — PRAZOSIN HYDROCHLORIDE 1 MG/1
1 CAPSULE ORAL
COMMUNITY
Start: 2025-05-21

## 2025-06-04 RX ORDER — PREGABALIN 100 MG/1
100 CAPSULE ORAL 2 TIMES DAILY
COMMUNITY
Start: 2025-04-24

## 2025-06-04 RX ORDER — LEVOTHYROXINE SODIUM 150 UG/1
150 TABLET ORAL DAILY
COMMUNITY
Start: 2025-03-29

## 2025-06-04 NOTE — TELEPHONE ENCOUNTER
Left message to call back.     This message routed to patient's , Marita CHARLES and also to sender Julieth CHARLES.

## 2025-07-11 ENCOUNTER — NURSE TRIAGE (OUTPATIENT)
Age: 65
End: 2025-07-11

## 2025-07-11 ENCOUNTER — HOSPITAL ENCOUNTER (EMERGENCY)
Facility: HOSPITAL | Age: 65
Discharge: HOME | End: 2025-07-11
Attending: EMERGENCY MEDICINE
Payer: MEDICARE

## 2025-07-11 ENCOUNTER — APPOINTMENT (EMERGENCY)
Dept: RADIOLOGY | Facility: HOSPITAL | Age: 65
End: 2025-07-11
Payer: MEDICARE

## 2025-07-11 VITALS
RESPIRATION RATE: 16 BRPM | HEART RATE: 65 BPM | WEIGHT: 203.71 LBS | BODY MASS INDEX: 30.97 KG/M2 | TEMPERATURE: 97.2 F | SYSTOLIC BLOOD PRESSURE: 143 MMHG | OXYGEN SATURATION: 97 % | DIASTOLIC BLOOD PRESSURE: 66 MMHG

## 2025-07-11 DIAGNOSIS — R07.9 CHEST PAIN: Primary | ICD-10-CM

## 2025-07-11 LAB
2HR DELTA HS TROPONIN: 1 NG/L
ALBUMIN SERPL BCG-MCNC: 4.4 G/DL (ref 3.5–5)
ALP SERPL-CCNC: 62 U/L (ref 34–104)
ALT SERPL W P-5'-P-CCNC: 13 U/L (ref 7–52)
ANION GAP SERPL CALCULATED.3IONS-SCNC: 8 MMOL/L (ref 4–13)
AST SERPL W P-5'-P-CCNC: 13 U/L (ref 13–39)
BASOPHILS # BLD AUTO: 0.08 THOUSANDS/ÂΜL (ref 0–0.1)
BASOPHILS NFR BLD AUTO: 1 % (ref 0–1)
BILIRUB SERPL-MCNC: 0.43 MG/DL (ref 0.2–1)
BNP SERPL-MCNC: 56 PG/ML (ref 0–100)
BUN SERPL-MCNC: 22 MG/DL (ref 5–25)
CALCIUM SERPL-MCNC: 9.1 MG/DL (ref 8.4–10.2)
CARDIAC TROPONIN I PNL SERPL HS: 3 NG/L (ref ?–50)
CARDIAC TROPONIN I PNL SERPL HS: 4 NG/L (ref ?–50)
CHLORIDE SERPL-SCNC: 116 MMOL/L (ref 96–108)
CO2 SERPL-SCNC: 21 MMOL/L (ref 21–32)
CREAT SERPL-MCNC: 1.52 MG/DL (ref 0.6–1.3)
EOSINOPHIL # BLD AUTO: 0.15 THOUSAND/ÂΜL (ref 0–0.61)
EOSINOPHIL NFR BLD AUTO: 3 % (ref 0–6)
ERYTHROCYTE [DISTWIDTH] IN BLOOD BY AUTOMATED COUNT: 13.2 % (ref 11.6–15.1)
GFR SERPL CREATININE-BSD FRML MDRD: 48 ML/MIN/1.73SQ M
GLUCOSE SERPL-MCNC: 148 MG/DL (ref 65–140)
HCT VFR BLD AUTO: 41.5 % (ref 36.5–49.3)
HGB BLD-MCNC: 13.5 G/DL (ref 12–17)
IMM GRANULOCYTES # BLD AUTO: 0.04 THOUSAND/UL (ref 0–0.2)
IMM GRANULOCYTES NFR BLD AUTO: 1 % (ref 0–2)
LYMPHOCYTES # BLD AUTO: 1.68 THOUSANDS/ÂΜL (ref 0.6–4.47)
LYMPHOCYTES NFR BLD AUTO: 28 % (ref 14–44)
MCH RBC QN AUTO: 30.4 PG (ref 26.8–34.3)
MCHC RBC AUTO-ENTMCNC: 32.5 G/DL (ref 31.4–37.4)
MCV RBC AUTO: 94 FL (ref 82–98)
MONOCYTES # BLD AUTO: 0.38 THOUSAND/ÂΜL (ref 0.17–1.22)
MONOCYTES NFR BLD AUTO: 6 % (ref 4–12)
NEUTROPHILS # BLD AUTO: 3.78 THOUSANDS/ÂΜL (ref 1.85–7.62)
NEUTS SEG NFR BLD AUTO: 61 % (ref 43–75)
NRBC BLD AUTO-RTO: 0 /100 WBCS
PLATELET # BLD AUTO: 196 THOUSANDS/UL (ref 149–390)
PMV BLD AUTO: 10.2 FL (ref 8.9–12.7)
POTASSIUM SERPL-SCNC: 3.6 MMOL/L (ref 3.5–5.3)
PROT SERPL-MCNC: 6.8 G/DL (ref 6.4–8.4)
RBC # BLD AUTO: 4.44 MILLION/UL (ref 3.88–5.62)
SODIUM SERPL-SCNC: 145 MMOL/L (ref 135–147)
WBC # BLD AUTO: 6.11 THOUSAND/UL (ref 4.31–10.16)

## 2025-07-11 PROCEDURE — 84484 ASSAY OF TROPONIN QUANT: CPT | Performed by: EMERGENCY MEDICINE

## 2025-07-11 PROCEDURE — 93005 ELECTROCARDIOGRAM TRACING: CPT

## 2025-07-11 PROCEDURE — 71045 X-RAY EXAM CHEST 1 VIEW: CPT

## 2025-07-11 PROCEDURE — 36415 COLL VENOUS BLD VENIPUNCTURE: CPT | Performed by: EMERGENCY MEDICINE

## 2025-07-11 PROCEDURE — 85025 COMPLETE CBC W/AUTO DIFF WBC: CPT | Performed by: EMERGENCY MEDICINE

## 2025-07-11 PROCEDURE — 80053 COMPREHEN METABOLIC PANEL: CPT | Performed by: EMERGENCY MEDICINE

## 2025-07-11 PROCEDURE — 99285 EMERGENCY DEPT VISIT HI MDM: CPT | Performed by: EMERGENCY MEDICINE

## 2025-07-11 PROCEDURE — 83880 ASSAY OF NATRIURETIC PEPTIDE: CPT | Performed by: EMERGENCY MEDICINE

## 2025-07-11 PROCEDURE — 99285 EMERGENCY DEPT VISIT HI MDM: CPT

## 2025-07-11 NOTE — TELEPHONE ENCOUNTER
"REASON FOR CONVERSATION: Chest Pain    SYMPTOMS: pt having chest pain for 2 weeks, in left side of chest.  Pain has been constant throughout the day, but varies in severity. Sometimes it will be a dull ache, other times a sharp pain.  He's also getting SOB intermittently.  In June, pt had 3 episodes of a speech change- lisp and had left side facial numbness.     OTHER HEALTH INFORMATION: pt will be getting an MRI to assess numbness and speech change.     PROTOCOL DISPOSITION: Go to ED/[facility] Now (Or to Office with PCP Approval)    CARE ADVICE PROVIDED: advised for pt to go ED for evaluation.  She will take pt to [facility]    PRACTICE FOLLOW-UP: none    Reason for Disposition   Chest pain lasting longer than 5 minutes and occurred in last 3 days (72 hours) (Exception: Feels exactly the same as previously diagnosed heartburn and has accompanying sour taste in mouth.)    Answer Assessment - Initial Assessment Questions  1. LOCATION: \"Where does it hurt?\"        Left side of chest  2. RADIATION: \"Does the pain go anywhere else?\" (e.g., into neck, jaw, arms, back)      Denies   3. ONSET: \"When did the chest pain begin?\" (Minutes, hours or days)       2 weeks   4. PATTERN: \"Does the pain come and go, or has it been constant since it started?\"  \"Does it get worse with exertion?\"       Comes, goes  5. DURATION: \"How long does it last\" (e.g., seconds, minutes, hours)      All day   6. SEVERITY: \"How bad is the pain?\"  (e.g., Scale 1-10; mild, moderate, or severe)      Dull ache and sharp pain; 5/10  7. CARDIAC RISK FACTORS: \"Do you have any history of heart problems or risk factors for heart disease?\" (e.g., angina, prior heart attack; diabetes, high blood pressure, high cholesterol, smoker, or strong family history of heart disease)      HTN  8. PULMONARY RISK FACTORS: \"Do you have any history of lung disease?\"  (e.g., blood clots in lung, asthma, emphysema, birth control pills)      Denies   9. CAUSE: \"What do you " "think is causing the chest pain?\"      Unsure   10. OTHER SYMPTOMS: \"Do you have any other symptoms?\" (e.g., dizziness, nausea, vomiting, sweating, fever, difficulty breathing, cough)        In June- 3 episodes of lisping and left side of face numb-- getting an MRI. SOB    Protocols used: Chest Pain-Adult-[place]    "

## 2025-07-11 NOTE — ED PROVIDER NOTES
Time reflects when diagnosis was documented in both MDM as applicable and the Disposition within this note       Time User Action Codes Description Comment    7/11/2025  6:18 PM Domingo Spangler [R07.9] Chest pain           ED Disposition       ED Disposition   Discharge    Condition   Stable    Date/Time   Fri Jul 11, 2025  6:18 PM    Comment   Anjum MAYANK Santos discharge to home/self care.                   Assessment & Plan       Medical Decision Making  Based on the history and medical screening exam performed the patient may be at risk for ACS, STEMI, NSTEMI, muscular chest pain, acid reflux, anxiety.    Based on the work-up performed in the emergency room which includes physical examination, and which may include laboratory studies and imaging as warranted including advanced imaging such as CT scan or ultrasound, the diagnostic considerations are narrowed to exclude limb or life-threatening process.    The patient is stable for discharge.  Serial troponins negative.  Patient remains hemodynamically stable.  EKG negative.  Chest x-ray negative    Amount and/or Complexity of Data Reviewed  Labs: ordered. Decision-making details documented in ED Course.     Details: Serial troponins negative  Radiology: ordered and independent interpretation performed. Decision-making details documented in ED Course.     Details: Chest x-ray negative  ECG/medicine tests: ordered and independent interpretation performed. Decision-making details documented in ED Course.     Details: Normal sinus rhythm rate 62 with left axis             Medications - No data to display    ED Risk Strat Scores   HEART Risk Score      Flowsheet Row Most Recent Value   Heart Score Risk Calculator    History 0 Filed at: 07/11/2025 1818   ECG 0 Filed at: 07/11/2025 1818   Age 1 Filed at: 07/11/2025 1818   Risk Factors 1 Filed at: 07/11/2025 1818   Troponin 0 Filed at: 07/11/2025 1818   HEART Score 2 Filed at: 07/11/2025 1818          HEART Risk Score       Flowsheet Row Most Recent Value   Heart Score Risk Calculator    History 0 Filed at: 07/11/2025 1818   ECG 0 Filed at: 07/11/2025 1818   Age 1 Filed at: 07/11/2025 1818   Risk Factors 1 Filed at: 07/11/2025 1818   Troponin 0 Filed at: 07/11/2025 1818   HEART Score 2 Filed at: 07/11/2025 1818                      No data recorded        SBIRT 20yo+      Flowsheet Row Most Recent Value   Initial Alcohol Screen: US AUDIT-C     1. How often do you have a drink containing alcohol? 0 Filed at: 07/11/2025 1541   2. How many drinks containing alcohol do you have on a typical day you are drinking?  0 Filed at: 07/11/2025 1541   3a. Male UNDER 65: How often do you have five or more drinks on one occasion? 0 Filed at: 07/11/2025 1541   Audit-C Score 0 Filed at: 07/11/2025 1541   NOY: How many times in the past year have you...    Used an illegal drug or used a prescription medication for non-medical reasons? Never Filed at: 07/11/2025 1541                            History of Present Illness       Chief Complaint   Patient presents with    Chest Pain     Been having chest pain for 2 weeks now. It comes and goes but is always there. Has had the pain every day.       Past Medical History[1]   Past Surgical History[2]   Family History[3]   Social History[4]   E-Cigarette/Vaping    E-Cigarette Use Never User       E-Cigarette/Vaping Substances    Nicotine No     THC No     CBD No     Flavoring No       I have reviewed and agree with the history as documented.     Intermittent episodes of dull left-sided chest pain over the past 2 weeks.  Patient states that the pain tends to last approximately 10 minutes at a time and resolve spontaneously.  Sometimes with mild associated shortness of breath.  No radiation of pain.  No nausea or vomiting or sweats.  At the time of ER presentation patient is pain-free.  Patient called cardiology and was advised to come to the emergency room.      History provided by:  Patient  Language   used: No    Chest Pain  Pain location:  L chest  Pain quality: dull    Pain radiates to:  Does not radiate  Pain severity:  Mild  Onset quality:  Gradual  Duration:  2 weeks  Timing:  Intermittent  Progression:  Resolved  Chronicity:  New  Relieved by:  Nothing  Worsened by:  Nothing tried  Ineffective treatments:  None tried  Associated symptoms: shortness of breath    Associated symptoms: no abdominal pain, no altered mental status, no anxiety, no back pain, no claudication, no cough, no dizziness, no dysphagia, no fever, no headache, no lower extremity edema, no nausea, no near-syncope, no palpitations, no syncope, not vomiting and no weakness        Review of Systems   Constitutional:  Negative for chills and fever.   HENT:  Negative for ear pain, hearing loss, sore throat, trouble swallowing and voice change.    Eyes:  Negative for pain and discharge.   Respiratory:  Positive for shortness of breath. Negative for cough and wheezing.    Cardiovascular:  Positive for chest pain. Negative for palpitations, claudication, syncope and near-syncope.   Gastrointestinal:  Negative for abdominal pain, blood in stool, constipation, diarrhea, nausea and vomiting.   Genitourinary:  Negative for dysuria, flank pain, frequency and hematuria.   Musculoskeletal:  Negative for back pain, joint swelling, neck pain and neck stiffness.   Skin:  Negative for rash and wound.   Neurological:  Negative for dizziness, seizures, syncope, facial asymmetry, weakness and headaches.   Psychiatric/Behavioral:  Negative for hallucinations, self-injury and suicidal ideas.    All other systems reviewed and are negative.          Objective       ED Triage Vitals   Temperature Pulse Blood Pressure Respirations SpO2 Patient Position - Orthostatic VS   07/11/25 1541 07/11/25 1541 07/11/25 1543 07/11/25 1541 07/11/25 1541 07/11/25 1541   (!) 97.2 °F (36.2 °C) 65 143/66 16 97 % Lying      Temp Source Heart Rate Source BP Location FiO2 (%)  Pain Score    07/11/25 1541 07/11/25 1541 07/11/25 1541 -- --    Temporal Monitor Left arm        Vitals      Date and Time Temp Pulse SpO2 Resp BP Pain Score FACES Pain Rating User   07/11/25 1543 -- -- -- -- 143/66 -- -- MB   07/11/25 1541 97.2 °F (36.2 °C) 65 97 % 16 -- -- -- MB            Physical Exam  Vitals and nursing note reviewed.   Constitutional:       General: He is not in acute distress.     Appearance: He is well-developed.   HENT:      Head: Normocephalic and atraumatic.      Right Ear: External ear normal.      Left Ear: External ear normal.     Eyes:      General: No scleral icterus.        Right eye: No discharge.         Left eye: No discharge.      Extraocular Movements: Extraocular movements intact.      Conjunctiva/sclera: Conjunctivae normal.       Cardiovascular:      Rate and Rhythm: Normal rate and regular rhythm.      Heart sounds: Normal heart sounds. No murmur heard.  Pulmonary:      Effort: Pulmonary effort is normal.      Breath sounds: Normal breath sounds. No wheezing or rales.   Abdominal:      General: Bowel sounds are normal. There is no distension.      Palpations: Abdomen is soft.      Tenderness: There is no abdominal tenderness. There is no guarding or rebound.     Musculoskeletal:         General: No deformity. Normal range of motion.      Cervical back: Normal range of motion and neck supple.     Skin:     General: Skin is warm and dry.      Findings: No rash.     Neurological:      General: No focal deficit present.      Mental Status: He is alert and oriented to person, place, and time.      Cranial Nerves: No cranial nerve deficit.     Psychiatric:         Mood and Affect: Mood normal.         Behavior: Behavior normal.         Thought Content: Thought content normal.         Judgment: Judgment normal.         Results Reviewed       Procedure Component Value Units Date/Time    HS Troponin I 2hr [[id number]]  (Normal) Collected: 07/11/25 1746    Lab Status: Final result  Specimen: Blood from Arm, Right Updated: 07/11/25 1811     hs TnI 2hr 4 ng/L      Delta 2hr hsTnI 1 ng/L     B-Type Natriuretic Peptide(BNP) [[id number]]  (Normal) Collected: 07/11/25 1549    Lab Status: Final result Specimen: Blood from Arm, Right Updated: 07/11/25 1619     BNP 56 pg/mL     HS Troponin 0hr (reflex protocol) [[id number]]  (Normal) Collected: 07/11/25 1549    Lab Status: Final result Specimen: Blood from Arm, Right Updated: 07/11/25 1616     hs TnI 0hr 3 ng/L     HS Troponin I 4hr [[id number]]     Lab Status: No result Specimen: Blood     Comprehensive metabolic panel [[id number]]  (Abnormal) Collected: 07/11/25 1549    Lab Status: Final result Specimen: Blood from Arm, Right Updated: 07/11/25 1608     Sodium 145 mmol/L      Potassium 3.6 mmol/L      Chloride 116 mmol/L      CO2 21 mmol/L      ANION GAP 8 mmol/L      BUN 22 mg/dL      Creatinine 1.52 mg/dL      Glucose 148 mg/dL      Calcium 9.1 mg/dL      AST 13 U/L      ALT 13 U/L      Alkaline Phosphatase 62 U/L      Total Protein 6.8 g/dL      Albumin 4.4 g/dL      Total Bilirubin 0.43 mg/dL      eGFR 48 ml/min/1.73sq m     Narrative:      National Kid[employer] Disease Foundation guidelines for Chronic Kidney Disease (CKD):     Stage 1 with normal or high GFR (GFR > 90 mL/min/1.73 square meters)    Stage 2 Mild CKD (GFR = 60-89 mL/min/1.73 square meters)    Stage 3A Moderate CKD (GFR = 45-59 mL/min/1.73 square meters)    Stage 3B Moderate CKD (GFR = 30-44 mL/min/1.73 square meters)    Stage 4 Severe CKD (GFR = 15-29 mL/min/1.73 square meters)    Stage 5 End Stage CKD (GFR <15 mL/min/1.73 square meters)  Note: GFR calculation is accurate only with a steady state creatinine    CBC and differential [[id number]] Collected: 07/11/25 1549    Lab Status: Final result Specimen: Blood from Arm, Right Updated: 07/11/25 1553     WBC 6.11 Thousand/uL      RBC 4.44 Million/uL      Hemoglobin 13.5 g/dL      Hematocrit 41.5 %      MCV 94 fL      MCH 30.4 pg      MCHC 32.5  g/dL      RDW 13.2 %      MPV 10.2 fL      Platelets 196 Thousands/uL      nRBC 0 /100 WBCs      Segmented % 61 %      Immature Grans % 1 %      Lymphocytes % 28 %      Monocytes % 6 %      Eosinophils Relative 3 %      Basophils Relative 1 %      Absolute Neutrophils 3.78 Thousands/µL      Absolute Immature Grans 0.04 Thousand/uL      Absolute Lymphocytes 1.68 Thousands/µL      Absolute Monocytes 0.38 Thousand/µL      Eosinophils Absolute 0.15 Thousand/µL      Basophils Absolute 0.08 Thousands/µL             XR chest portable   ED Interpretation by Domingo Spangler MD ([id number])   No acute finding          ECG 12 Lead Documentation Only    Date/Time: 2025 3:40 PM    Performed by: Domingo Spangler MD  Authorized by: Domingo Spangler MD    ECG reviewed by me, the ED Provider: yes    Patient location:  ED  Previous ECG:     Previous ECG:  Unavailable  Interpretation:     Interpretation: normal    Rate:     ECG rate:  62    ECG rate assessment: normal    Rhythm:     Rhythm: sinus rhythm    Ectopy:     Ectopy: none    QRS:     QRS axis:  Left    QRS intervals:  Normal  Conduction:     Conduction: normal    ST segments:     ST segments:  Normal  T waves:     T waves: normal        ED Medication and Procedure Management   Prior to Admission Medications   Prescriptions Last Dose Informant Patient Reported? Taking?   Cholecalciferol (Vitamin D3) 10 MCG (400 UNIT) CHEW  Spouse/Significant Other Yes No   Sig: Chew 2 tablets daily   Melatonin 10 MG TABS  Spouse/Significant Other Yes No   Sig: Take 10 mg by mouth daily   Memantine HCl ER 28 MG CP24  Spouse/Significant Other Yes No   Si mg in the morning   amLODIPine (NORVASC) 10 mg tablet   No No   Sig: Take 1 tablet (10 mg total) by mouth daily   carvedilol (COREG) 3.125 mg tablet   Yes No   Sig: Take 3.125 mg by mouth 2 (two) times a day with meals   divalproex sodium (DEPAKOTE) 500 mg EC tablet  Spouse/Significant Other Yes No   Sig: Take 500 mg by mouth One  in the AM and two in the PM   docusate sodium (COLACE) 100 mg capsule  Spouse/Significant Other Yes No   Sig: Take 100 mg by mouth 2 (two) times a day   fenofibrate (TRICOR) 48 mg tablet   No No   Sig: Take 1 tablet (48 mg total) by mouth daily   finasteride (PROSCAR) 5 mg tablet   No No   Sig: Take 1 tablet (5 mg total) by mouth daily   fluvoxaMINE (LUVOX) 100 mg tablet  Spouse/Significant Other Yes No   Sig: Take 100 mg by mouth 2 (two) times a day   levothyroxine 125 mcg tablet  Spouse/Significant Other Yes No   levothyroxine 150 mcg tablet   Yes No   Sig: Take 150 mcg by mouth daily   losartan (COZAAR) 100 MG tablet  Spouse/Significant Other No No   Sig: Take 1 tablet (100 mg total) by mouth daily   mirtazapine (REMERON SOL-TAB) 45 mg dispersible tablet  Spouse/Significant Other Yes No   Sig: Take 45 mg by mouth daily at bedtime   omeprazole (PriLOSEC OTC) 20 MG tablet  Spouse/Significant Other Yes No   Sig: Take 20 mg by mouth daily   potassium chloride (Klor-Con M20) 20 mEq tablet   No No   Sig: Take 1 tablet (20 mEq total) by mouth 2 (two) times a day for 5 days   pravastatin (PRAVACHOL) 80 mg tablet  Spouse/Significant Other Yes No   Sig: Take 80 mg by mouth daily    prazosin (MINIPRESS) 1 mg capsule   Yes No   Sig: Take 1 mg by mouth daily at bedtime   pregabalin (LYRICA) 100 mg capsule   Yes No   Sig: Take 100 mg by mouth 2 (two) times a day   pregabalin (LYRICA) 150 mg capsule  Spouse/Significant Other Yes No   pregabalin (LYRICA) 75 mg capsule   Yes No   Sig: Take 75 mg by mouth 2 (two) times a day   spironolactone (ALDACTONE) 25 mg tablet   No No   Sig: Take 1 tablet (25 mg total) by mouth daily   topiramate (TOPAMAX) 100 mg tablet  Spouse/Significant Other Yes No   Sig: Take 100 mg by mouth 2 (two) times a day    vitamin B-12 (CYANOCOBALAMIN) 100 MCG TABS  Spouse/Significant Other Yes No   Sig: Take 1 tablet by mouth daily      Facility-Administered Medications: None     Patient's Medications    Discharge Prescriptions    No medications on file     No discharge procedures on file.  ED SEPSIS DOCUMENTATION   Time reflects when diagnosis was documented in both MDM as applicable and the Disposition within this note       Time User Action Codes Description Comment    2025  6:18 PM Domingo Spangler Add [R07.9] Chest pain                      [1]   Past Medical History:  Diagnosis Date    Abnormal EKG     Aneurysm of anterior cerebral artery     Anxiety     Bipolar disorder (HCC)     BPH (benign prostatic hyperplasia)     Chronic migraine     CPAP (continuous positive airway pressure) dependence     Depression     Disease of thyroid gland     Hesitancy of micturition     Hydrocephalus (HCC)     Hyperlipidemia     Hypertension     Hypertriglyceridemia     Hypothyroidism     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     Neutropenia (HCC)     Primary thrombocytopenia (HCC)     Seizure (HCC)     Sleep apnea     Vitamin D deficiency    [2]   Past Surgical History:  Procedure Laterality Date    BRAIN SURGERY      CARDIAC CATHETERIZATION      CHOLECYSTECTOMY      PROSTATE SURGERY      streched the prostate    VENTRICULOPERITONEAL SHUNT     [3]   Family History  Problem Relation Name Age of Onset    Anuerysm Mother      Colon cancer Father      Hypertension Father      Heart disease Sister Jose     Heart attack Sister Jose     Breast cancer Sister Jose     Stomach cancer Brother Payam     Testicular cancer Brother Noe     Skin cancer Brother Matt     Diabetes Son      Thyroid disease Daughter      Cleft palate Daughter      Cleft lip Daughter     [4]   Social History  Tobacco Use    Smoking status: Former     Current packs/day: 0.00     Types: Cigarettes     Start date:      Quit date:      Years since quittin.5    Smokeless tobacco: Never   Vaping Use    Vaping status: Never Used   Substance Use Topics    Alcohol use: Not Currently      Comment: extremely rare    Drug use: Never        Domingo Spangler MD  07/11/25 [id number]

## 2025-07-14 LAB
ATRIAL RATE: 62 BPM
P AXIS: 64 DEGREES
PR INTERVAL: 134 MS
QRS AXIS: -40 DEGREES
QRSD INTERVAL: 96 MS
QT INTERVAL: 444 MS
QTC INTERVAL: 450 MS
T WAVE AXIS: 18 DEGREES
VENTRICULAR RATE: 62 BPM

## 2025-07-14 PROCEDURE — 93010 ELECTROCARDIOGRAM REPORT: CPT | Performed by: INTERNAL MEDICINE

## 2025-07-14 NOTE — TELEPHONE ENCOUNTER
ER records reviewed. No acute findings. You can offer his wife a sooner appointment with me -ok to use an acute slot. thanks